# Patient Record
Sex: MALE | Race: BLACK OR AFRICAN AMERICAN | Employment: OTHER | URBAN - METROPOLITAN AREA
[De-identification: names, ages, dates, MRNs, and addresses within clinical notes are randomized per-mention and may not be internally consistent; named-entity substitution may affect disease eponyms.]

---

## 2017-01-23 DIAGNOSIS — I48.0 PAROXYSMAL ATRIAL FIBRILLATION (HCC): Primary | ICD-10-CM

## 2017-01-23 LAB
INR BLD: 1.22 (ref 0.85–1.16)
PROTHROMBIN TIME: 14 SEC (ref 9.8–13)

## 2017-01-24 ENCOUNTER — ANTI-COAG VISIT (OUTPATIENT)
Dept: CARDIOLOGY CLINIC | Age: 67
End: 2017-01-24

## 2017-01-25 ENCOUNTER — TELEPHONE (OUTPATIENT)
Dept: PRIMARY CARE CLINIC | Age: 67
End: 2017-01-25

## 2017-02-08 ENCOUNTER — TELEPHONE (OUTPATIENT)
Dept: CARDIOLOGY | Age: 67
End: 2017-02-08

## 2017-02-08 ENCOUNTER — ANTI-COAG VISIT (OUTPATIENT)
Dept: CARDIOLOGY | Age: 67
End: 2017-02-08

## 2017-02-08 DIAGNOSIS — I42.9 CARDIOMYOPATHY (HCC): Primary | ICD-10-CM

## 2017-02-08 DIAGNOSIS — I48.0 PAROXYSMAL ATRIAL FIBRILLATION (HCC): ICD-10-CM

## 2017-02-08 LAB
INR BLD: 2.45 (ref 0.85–1.16)
PROTHROMBIN TIME: 28.4 SEC (ref 9.8–13)

## 2017-02-24 ENCOUNTER — TELEPHONE (OUTPATIENT)
Dept: CARDIOLOGY CLINIC | Age: 67
End: 2017-02-24

## 2017-03-08 ENCOUNTER — OFFICE VISIT (OUTPATIENT)
Dept: CARDIOLOGY CLINIC | Age: 67
End: 2017-03-08

## 2017-03-08 ENCOUNTER — PROCEDURE VISIT (OUTPATIENT)
Dept: CARDIOLOGY CLINIC | Age: 67
End: 2017-03-08

## 2017-03-08 VITALS
BODY MASS INDEX: 37.75 KG/M2 | DIASTOLIC BLOOD PRESSURE: 104 MMHG | HEART RATE: 60 BPM | SYSTOLIC BLOOD PRESSURE: 144 MMHG | WEIGHT: 294 LBS

## 2017-03-08 DIAGNOSIS — I10 ESSENTIAL HYPERTENSION: ICD-10-CM

## 2017-03-08 DIAGNOSIS — M1A.09X1 IDIOPATHIC CHRONIC GOUT OF MULTIPLE SITES WITH TOPHUS: ICD-10-CM

## 2017-03-08 DIAGNOSIS — I50.22 CHRONIC SYSTOLIC CONGESTIVE HEART FAILURE (HCC): ICD-10-CM

## 2017-03-08 DIAGNOSIS — Z95.0 PACEMAKER: ICD-10-CM

## 2017-03-08 DIAGNOSIS — I48.0 PAROXYSMAL ATRIAL FIBRILLATION (HCC): ICD-10-CM

## 2017-03-08 DIAGNOSIS — I10 ESSENTIAL HYPERTENSION, BENIGN: Primary | ICD-10-CM

## 2017-03-08 DIAGNOSIS — I42.9 CARDIOMYOPATHY (HCC): ICD-10-CM

## 2017-03-08 DIAGNOSIS — Z95.810 ICD (IMPLANTABLE CARDIOVERTER-DEFIBRILLATOR) IN PLACE: ICD-10-CM

## 2017-03-08 PROCEDURE — 99214 OFFICE O/P EST MOD 30 MIN: CPT | Performed by: NURSE PRACTITIONER

## 2017-03-08 PROCEDURE — G8419 CALC BMI OUT NRM PARAM NOF/U: HCPCS | Performed by: NURSE PRACTITIONER

## 2017-03-08 PROCEDURE — 1123F ACP DISCUSS/DSCN MKR DOCD: CPT | Performed by: NURSE PRACTITIONER

## 2017-03-08 PROCEDURE — G8484 FLU IMMUNIZE NO ADMIN: HCPCS | Performed by: NURSE PRACTITIONER

## 2017-03-08 PROCEDURE — G8427 DOCREV CUR MEDS BY ELIG CLIN: HCPCS | Performed by: NURSE PRACTITIONER

## 2017-03-08 PROCEDURE — 4040F PNEUMOC VAC/ADMIN/RCVD: CPT | Performed by: NURSE PRACTITIONER

## 2017-03-08 PROCEDURE — 3017F COLORECTAL CA SCREEN DOC REV: CPT | Performed by: NURSE PRACTITIONER

## 2017-03-08 PROCEDURE — 93000 ELECTROCARDIOGRAM COMPLETE: CPT | Performed by: NURSE PRACTITIONER

## 2017-03-08 PROCEDURE — 1036F TOBACCO NON-USER: CPT | Performed by: NURSE PRACTITIONER

## 2017-03-08 PROCEDURE — 93284 PRGRMG EVAL IMPLANTABLE DFB: CPT | Performed by: INTERNAL MEDICINE

## 2017-03-08 RX ORDER — WARFARIN SODIUM 5 MG/1
TABLET ORAL
Qty: 60 TABLET | Refills: 3 | Status: SHIPPED | OUTPATIENT
Start: 2017-03-08 | End: 2017-05-09 | Stop reason: SDUPTHER

## 2017-03-08 RX ORDER — HYDRALAZINE HYDROCHLORIDE 50 MG/1
50 TABLET, FILM COATED ORAL 3 TIMES DAILY
Qty: 90 TABLET | Refills: 3 | Status: SHIPPED | OUTPATIENT
Start: 2017-03-08 | End: 2017-09-07 | Stop reason: SDUPTHER

## 2017-03-08 RX ORDER — AMIODARONE HYDROCHLORIDE 200 MG/1
100 TABLET ORAL DAILY
Qty: 30 TABLET | Refills: 3 | Status: SHIPPED | OUTPATIENT
Start: 2017-03-08 | End: 2017-11-17 | Stop reason: SDUPTHER

## 2017-03-08 RX ORDER — ISOSORBIDE MONONITRATE 30 MG/1
TABLET, EXTENDED RELEASE ORAL
Qty: 30 TABLET | Refills: 5 | Status: SHIPPED | OUTPATIENT
Start: 2017-03-08 | End: 2017-09-17 | Stop reason: SDUPTHER

## 2017-03-08 RX ORDER — CARVEDILOL 25 MG/1
TABLET ORAL
Qty: 60 TABLET | Refills: 5 | Status: SHIPPED | OUTPATIENT
Start: 2017-03-08 | End: 2017-09-17 | Stop reason: SDUPTHER

## 2017-03-08 RX ORDER — BENAZEPRIL HYDROCHLORIDE 20 MG/1
TABLET ORAL
Qty: 60 TABLET | Refills: 5 | Status: SHIPPED | OUTPATIENT
Start: 2017-03-08 | End: 2017-03-08

## 2017-03-08 RX ORDER — SPIRONOLACTONE 25 MG/1
TABLET ORAL
Qty: 30 TABLET | Refills: 5 | Status: SHIPPED | OUTPATIENT
Start: 2017-03-08 | End: 2017-03-22 | Stop reason: ALTCHOICE

## 2017-03-17 ENCOUNTER — ANTI-COAG VISIT (OUTPATIENT)
Dept: CARDIOLOGY CLINIC | Age: 67
End: 2017-03-17

## 2017-03-17 DIAGNOSIS — I48.0 PAROXYSMAL ATRIAL FIBRILLATION (HCC): ICD-10-CM

## 2017-03-17 DIAGNOSIS — I42.9 CARDIOMYOPATHY (HCC): ICD-10-CM

## 2017-03-17 DIAGNOSIS — M1A.09X1 IDIOPATHIC CHRONIC GOUT OF MULTIPLE SITES WITH TOPHUS: ICD-10-CM

## 2017-03-17 DIAGNOSIS — Z95.810 ICD (IMPLANTABLE CARDIOVERTER-DEFIBRILLATOR) IN PLACE: ICD-10-CM

## 2017-03-17 DIAGNOSIS — I10 ESSENTIAL HYPERTENSION: ICD-10-CM

## 2017-03-17 DIAGNOSIS — I10 ESSENTIAL HYPERTENSION, BENIGN: ICD-10-CM

## 2017-03-17 DIAGNOSIS — Z95.0 PACEMAKER: ICD-10-CM

## 2017-03-17 DIAGNOSIS — I50.22 CHRONIC SYSTOLIC CONGESTIVE HEART FAILURE (HCC): ICD-10-CM

## 2017-03-17 LAB
A/G RATIO: 1.3 (ref 1.1–2.2)
ALBUMIN SERPL-MCNC: 3.6 G/DL (ref 3.4–5)
ALP BLD-CCNC: 86 U/L (ref 40–129)
ALT SERPL-CCNC: 32 U/L (ref 10–40)
ANION GAP SERPL CALCULATED.3IONS-SCNC: 12 MMOL/L (ref 3–16)
AST SERPL-CCNC: 37 U/L (ref 15–37)
BILIRUB SERPL-MCNC: 0.7 MG/DL (ref 0–1)
BUN BLDV-MCNC: 39 MG/DL (ref 7–20)
CALCIUM SERPL-MCNC: 8.5 MG/DL (ref 8.3–10.6)
CHLORIDE BLD-SCNC: 105 MMOL/L (ref 99–110)
CO2: 26 MMOL/L (ref 21–32)
CREAT SERPL-MCNC: 2.4 MG/DL (ref 0.8–1.3)
GFR AFRICAN AMERICAN: 33
GFR NON-AFRICAN AMERICAN: 27
GLOBULIN: 2.8 G/DL
GLUCOSE BLD-MCNC: 75 MG/DL (ref 70–99)
INR BLD: 2.85 (ref 0.85–1.15)
POTASSIUM SERPL-SCNC: 4 MMOL/L (ref 3.5–5.1)
PROTHROMBIN TIME: 32.2 SEC (ref 9.6–13)
SODIUM BLD-SCNC: 143 MMOL/L (ref 136–145)
T4 FREE: 1.4 NG/DL (ref 0.9–1.8)
TOTAL PROTEIN: 6.4 G/DL (ref 6.4–8.2)
TSH REFLEX FT4: 7.85 UIU/ML (ref 0.27–4.2)

## 2017-03-22 ENCOUNTER — TELEPHONE (OUTPATIENT)
Dept: CARDIOLOGY CLINIC | Age: 67
End: 2017-03-22

## 2017-03-22 ENCOUNTER — OFFICE VISIT (OUTPATIENT)
Dept: CARDIOLOGY CLINIC | Age: 67
End: 2017-03-22

## 2017-03-22 VITALS
WEIGHT: 303 LBS | HEART RATE: 68 BPM | SYSTOLIC BLOOD PRESSURE: 120 MMHG | BODY MASS INDEX: 38.9 KG/M2 | DIASTOLIC BLOOD PRESSURE: 90 MMHG

## 2017-03-22 DIAGNOSIS — I10 ESSENTIAL HYPERTENSION: ICD-10-CM

## 2017-03-22 DIAGNOSIS — M10.9 ACUTE GOUT OF LEFT WRIST, UNSPECIFIED CAUSE: ICD-10-CM

## 2017-03-22 DIAGNOSIS — I10 ESSENTIAL HYPERTENSION, BENIGN: Primary | ICD-10-CM

## 2017-03-22 DIAGNOSIS — I42.9 CARDIOMYOPATHY (HCC): ICD-10-CM

## 2017-03-22 DIAGNOSIS — I50.22 CHRONIC SYSTOLIC CONGESTIVE HEART FAILURE (HCC): ICD-10-CM

## 2017-03-22 DIAGNOSIS — I48.0 PAROXYSMAL ATRIAL FIBRILLATION (HCC): ICD-10-CM

## 2017-03-22 PROCEDURE — 1036F TOBACCO NON-USER: CPT | Performed by: NURSE PRACTITIONER

## 2017-03-22 PROCEDURE — G8427 DOCREV CUR MEDS BY ELIG CLIN: HCPCS | Performed by: NURSE PRACTITIONER

## 2017-03-22 PROCEDURE — 99214 OFFICE O/P EST MOD 30 MIN: CPT | Performed by: NURSE PRACTITIONER

## 2017-03-22 PROCEDURE — 3017F COLORECTAL CA SCREEN DOC REV: CPT | Performed by: NURSE PRACTITIONER

## 2017-03-22 PROCEDURE — G8484 FLU IMMUNIZE NO ADMIN: HCPCS | Performed by: NURSE PRACTITIONER

## 2017-03-22 PROCEDURE — 4040F PNEUMOC VAC/ADMIN/RCVD: CPT | Performed by: NURSE PRACTITIONER

## 2017-03-22 PROCEDURE — G8419 CALC BMI OUT NRM PARAM NOF/U: HCPCS | Performed by: NURSE PRACTITIONER

## 2017-03-22 PROCEDURE — 1123F ACP DISCUSS/DSCN MKR DOCD: CPT | Performed by: NURSE PRACTITIONER

## 2017-03-22 RX ORDER — COLCHICINE 0.6 MG/1
0.6 TABLET ORAL 2 TIMES DAILY
Qty: 60 TABLET | Refills: 1 | Status: SHIPPED | OUTPATIENT
Start: 2017-03-22 | End: 2017-03-22 | Stop reason: SDUPTHER

## 2017-03-22 RX ORDER — HYDROCODONE BITARTRATE AND ACETAMINOPHEN 5; 325 MG/1; MG/1
1 TABLET ORAL EVERY 6 HOURS PRN
COMMUNITY
End: 2018-02-12 | Stop reason: ALTCHOICE

## 2017-03-22 RX ORDER — COLCHICINE 0.6 MG/1
0.6 TABLET ORAL DAILY
Qty: 60 TABLET | Refills: 1
Start: 2017-03-22 | End: 2018-02-12 | Stop reason: ALTCHOICE

## 2017-04-13 ENCOUNTER — TELEPHONE (OUTPATIENT)
Dept: RHEUMATOLOGY | Age: 67
End: 2017-04-13

## 2017-04-13 RX ORDER — PREDNISONE 10 MG/1
TABLET ORAL
Qty: 20 TABLET | Refills: 0 | Status: SHIPPED | OUTPATIENT
Start: 2017-04-13 | End: 2018-02-12 | Stop reason: ALTCHOICE

## 2017-05-05 ENCOUNTER — OFFICE VISIT (OUTPATIENT)
Dept: CARDIOLOGY CLINIC | Age: 67
End: 2017-05-05

## 2017-05-05 VITALS
HEART RATE: 70 BPM | BODY MASS INDEX: 38.54 KG/M2 | WEIGHT: 300.2 LBS | DIASTOLIC BLOOD PRESSURE: 90 MMHG | SYSTOLIC BLOOD PRESSURE: 142 MMHG

## 2017-05-05 DIAGNOSIS — I10 ESSENTIAL HYPERTENSION, BENIGN: ICD-10-CM

## 2017-05-05 DIAGNOSIS — I42.9 CARDIOMYOPATHY (HCC): Primary | ICD-10-CM

## 2017-05-05 DIAGNOSIS — I10 ESSENTIAL HYPERTENSION: ICD-10-CM

## 2017-05-05 DIAGNOSIS — I48.0 PAROXYSMAL ATRIAL FIBRILLATION (HCC): ICD-10-CM

## 2017-05-05 PROCEDURE — 99214 OFFICE O/P EST MOD 30 MIN: CPT | Performed by: NURSE PRACTITIONER

## 2017-05-05 PROCEDURE — G8419 CALC BMI OUT NRM PARAM NOF/U: HCPCS | Performed by: NURSE PRACTITIONER

## 2017-05-05 PROCEDURE — 1123F ACP DISCUSS/DSCN MKR DOCD: CPT | Performed by: NURSE PRACTITIONER

## 2017-05-05 PROCEDURE — 1036F TOBACCO NON-USER: CPT | Performed by: NURSE PRACTITIONER

## 2017-05-05 PROCEDURE — 3017F COLORECTAL CA SCREEN DOC REV: CPT | Performed by: NURSE PRACTITIONER

## 2017-05-05 PROCEDURE — 4040F PNEUMOC VAC/ADMIN/RCVD: CPT | Performed by: NURSE PRACTITIONER

## 2017-05-05 PROCEDURE — G8427 DOCREV CUR MEDS BY ELIG CLIN: HCPCS | Performed by: NURSE PRACTITIONER

## 2017-05-09 ENCOUNTER — ANTI-COAG VISIT (OUTPATIENT)
Dept: CARDIOLOGY CLINIC | Age: 67
End: 2017-05-09

## 2017-05-09 DIAGNOSIS — I10 ESSENTIAL HYPERTENSION: ICD-10-CM

## 2017-05-09 DIAGNOSIS — Z95.0 PACEMAKER: ICD-10-CM

## 2017-05-09 DIAGNOSIS — I48.0 PAROXYSMAL ATRIAL FIBRILLATION (HCC): ICD-10-CM

## 2017-05-10 RX ORDER — WARFARIN SODIUM 5 MG/1
TABLET ORAL
Qty: 60 TABLET | Refills: 0 | Status: SHIPPED | OUTPATIENT
Start: 2017-05-10 | End: 2019-05-08 | Stop reason: CLARIF

## 2017-05-12 DIAGNOSIS — I48.0 PAROXYSMAL ATRIAL FIBRILLATION (HCC): ICD-10-CM

## 2017-05-12 LAB
INR BLD: 2.5 (ref 0.85–1.15)
PROTHROMBIN TIME: 28.2 SEC (ref 9.6–13)

## 2017-05-15 LAB — INR BLD: 2.5

## 2017-05-17 ENCOUNTER — TELEPHONE (OUTPATIENT)
Dept: CARDIOLOGY CLINIC | Age: 67
End: 2017-05-17

## 2017-06-13 ENCOUNTER — NURSE ONLY (OUTPATIENT)
Dept: CARDIOLOGY CLINIC | Age: 67
End: 2017-06-13

## 2017-06-13 DIAGNOSIS — I50.22 CHRONIC SYSTOLIC CONGESTIVE HEART FAILURE (HCC): ICD-10-CM

## 2017-06-13 DIAGNOSIS — Z95.810 ICD (IMPLANTABLE CARDIOVERTER-DEFIBRILLATOR) IN PLACE: Primary | ICD-10-CM

## 2017-06-13 DIAGNOSIS — I42.9 CARDIOMYOPATHY (HCC): ICD-10-CM

## 2017-06-13 PROCEDURE — 93297 REM INTERROG DEV EVAL ICPMS: CPT | Performed by: INTERNAL MEDICINE

## 2017-06-13 PROCEDURE — 93295 DEV INTERROG REMOTE 1/2/MLT: CPT | Performed by: INTERNAL MEDICINE

## 2017-06-13 PROCEDURE — 93296 REM INTERROG EVL PM/IDS: CPT | Performed by: INTERNAL MEDICINE

## 2017-06-30 ENCOUNTER — OFFICE VISIT (OUTPATIENT)
Dept: PRIMARY CARE CLINIC | Age: 67
End: 2017-06-30

## 2017-06-30 VITALS
DIASTOLIC BLOOD PRESSURE: 68 MMHG | BODY MASS INDEX: 36.96 KG/M2 | SYSTOLIC BLOOD PRESSURE: 118 MMHG | HEIGHT: 74 IN | WEIGHT: 288 LBS

## 2017-06-30 DIAGNOSIS — Z00.00 HEALTH CARE MAINTENANCE: ICD-10-CM

## 2017-06-30 DIAGNOSIS — I42.9 CARDIOMYOPATHY (HCC): ICD-10-CM

## 2017-06-30 DIAGNOSIS — I10 ESSENTIAL HYPERTENSION: ICD-10-CM

## 2017-06-30 DIAGNOSIS — D47.2 MONOCLONAL GAMMOPATHY: ICD-10-CM

## 2017-06-30 DIAGNOSIS — I48.0 PAROXYSMAL ATRIAL FIBRILLATION (HCC): ICD-10-CM

## 2017-06-30 DIAGNOSIS — N18.4 CKD (CHRONIC KIDNEY DISEASE), STAGE 4 (SEVERE): ICD-10-CM

## 2017-06-30 PROCEDURE — 90472 IMMUNIZATION ADMIN EACH ADD: CPT | Performed by: INTERNAL MEDICINE

## 2017-06-30 PROCEDURE — 90471 IMMUNIZATION ADMIN: CPT | Performed by: INTERNAL MEDICINE

## 2017-06-30 PROCEDURE — 90715 TDAP VACCINE 7 YRS/> IM: CPT | Performed by: INTERNAL MEDICINE

## 2017-06-30 PROCEDURE — 99203 OFFICE O/P NEW LOW 30 MIN: CPT | Performed by: INTERNAL MEDICINE

## 2017-06-30 PROCEDURE — 90670 PCV13 VACCINE IM: CPT | Performed by: INTERNAL MEDICINE

## 2017-06-30 RX ORDER — DIPHENHYDRAMINE HCL 25 MG
25 CAPSULE ORAL EVERY 6 HOURS PRN
Qty: 30 CAPSULE | COMMUNITY
Start: 2017-06-30 | End: 2017-07-10

## 2017-06-30 ASSESSMENT — ENCOUNTER SYMPTOMS
NAUSEA: 0
COUGH: 0
WHEEZING: 0
EYE REDNESS: 0
EYE ITCHING: 0
BACK PAIN: 0
VOMITING: 0
SORE THROAT: 0
CONSTIPATION: 0
DIARRHEA: 0
CHEST TIGHTNESS: 0
ABDOMINAL PAIN: 0

## 2017-07-06 ENCOUNTER — PROCEDURE VISIT (OUTPATIENT)
Dept: CARDIOLOGY CLINIC | Age: 67
End: 2017-07-06

## 2017-07-06 ENCOUNTER — OFFICE VISIT (OUTPATIENT)
Dept: CARDIOLOGY CLINIC | Age: 67
End: 2017-07-06

## 2017-07-06 VITALS
HEART RATE: 60 BPM | WEIGHT: 292 LBS | SYSTOLIC BLOOD PRESSURE: 124 MMHG | DIASTOLIC BLOOD PRESSURE: 74 MMHG | BODY MASS INDEX: 37.49 KG/M2

## 2017-07-06 DIAGNOSIS — Z95.810 ICD (IMPLANTABLE CARDIOVERTER-DEFIBRILLATOR) IN PLACE: ICD-10-CM

## 2017-07-06 DIAGNOSIS — I42.9 CARDIOMYOPATHY (HCC): Primary | ICD-10-CM

## 2017-07-06 DIAGNOSIS — I42.9 CARDIOMYOPATHY (HCC): ICD-10-CM

## 2017-07-06 DIAGNOSIS — I10 ESSENTIAL HYPERTENSION: ICD-10-CM

## 2017-07-06 DIAGNOSIS — I48.0 PAROXYSMAL ATRIAL FIBRILLATION (HCC): Primary | ICD-10-CM

## 2017-07-06 PROCEDURE — 93284 PRGRMG EVAL IMPLANTABLE DFB: CPT | Performed by: INTERNAL MEDICINE

## 2017-07-06 PROCEDURE — 99214 OFFICE O/P EST MOD 30 MIN: CPT | Performed by: INTERNAL MEDICINE

## 2017-07-24 ENCOUNTER — TELEPHONE (OUTPATIENT)
Dept: PRIMARY CARE CLINIC | Age: 67
End: 2017-07-24

## 2017-08-08 ENCOUNTER — TELEPHONE (OUTPATIENT)
Dept: PRIMARY CARE CLINIC | Age: 67
End: 2017-08-08

## 2017-09-07 RX ORDER — HYDRALAZINE HYDROCHLORIDE 50 MG/1
TABLET, FILM COATED ORAL
Qty: 90 TABLET | Refills: 2 | Status: SHIPPED | OUTPATIENT
Start: 2017-09-07 | End: 2018-01-11 | Stop reason: SDUPTHER

## 2017-09-08 ENCOUNTER — OFFICE VISIT (OUTPATIENT)
Dept: PRIMARY CARE CLINIC | Age: 67
End: 2017-09-08

## 2017-09-08 DIAGNOSIS — I42.9 CARDIOMYOPATHY, UNSPECIFIED TYPE (HCC): ICD-10-CM

## 2017-09-08 DIAGNOSIS — D47.2 MONOCLONAL GAMMOPATHY: ICD-10-CM

## 2017-09-08 DIAGNOSIS — M10.9 GOUT, UNSPECIFIED CAUSE, UNSPECIFIED CHRONICITY, UNSPECIFIED SITE: ICD-10-CM

## 2017-09-08 DIAGNOSIS — Z12.11 COLON CANCER SCREENING: Primary | ICD-10-CM

## 2017-09-08 DIAGNOSIS — I48.0 PAROXYSMAL ATRIAL FIBRILLATION (HCC): ICD-10-CM

## 2017-09-08 LAB
INR BLD: 1.42 (ref 0.85–1.15)
PROTHROMBIN TIME: 16.1 SEC (ref 9.6–13)

## 2017-09-08 PROCEDURE — 90471 IMMUNIZATION ADMIN: CPT | Performed by: INTERNAL MEDICINE

## 2017-09-08 PROCEDURE — 99214 OFFICE O/P EST MOD 30 MIN: CPT | Performed by: INTERNAL MEDICINE

## 2017-09-08 PROCEDURE — 90662 IIV NO PRSV INCREASED AG IM: CPT | Performed by: INTERNAL MEDICINE

## 2017-09-08 ASSESSMENT — PATIENT HEALTH QUESTIONNAIRE - PHQ9
2. FEELING DOWN, DEPRESSED OR HOPELESS: 0
SUM OF ALL RESPONSES TO PHQ9 QUESTIONS 1 & 2: 0
SUM OF ALL RESPONSES TO PHQ QUESTIONS 1-9: 0
1. LITTLE INTEREST OR PLEASURE IN DOING THINGS: 0

## 2017-09-11 VITALS
OXYGEN SATURATION: 98 % | SYSTOLIC BLOOD PRESSURE: 128 MMHG | HEART RATE: 74 BPM | TEMPERATURE: 97.7 F | DIASTOLIC BLOOD PRESSURE: 86 MMHG | BODY MASS INDEX: 36.19 KG/M2 | WEIGHT: 282 LBS | HEIGHT: 74 IN

## 2017-09-11 ASSESSMENT — ENCOUNTER SYMPTOMS
ABDOMINAL PAIN: 0
CONSTIPATION: 0
EYE REDNESS: 0
COUGH: 0
CHEST TIGHTNESS: 0
EYE ITCHING: 0
WHEEZING: 0
BACK PAIN: 0
DIARRHEA: 0
NAUSEA: 0
VOMITING: 0
SORE THROAT: 0

## 2017-09-12 ENCOUNTER — ANTI-COAG VISIT (OUTPATIENT)
Dept: CARDIOLOGY CLINIC | Age: 67
End: 2017-09-12

## 2017-09-12 ENCOUNTER — NURSE ONLY (OUTPATIENT)
Dept: CARDIOLOGY CLINIC | Age: 67
End: 2017-09-12

## 2017-09-12 DIAGNOSIS — Z95.810 ICD (IMPLANTABLE CARDIOVERTER-DEFIBRILLATOR) IN PLACE: ICD-10-CM

## 2017-09-12 DIAGNOSIS — I42.9 CARDIOMYOPATHY, UNSPECIFIED TYPE (HCC): ICD-10-CM

## 2017-09-12 DIAGNOSIS — I42.9 CARDIOMYOPATHY (HCC): ICD-10-CM

## 2017-09-12 PROCEDURE — 93296 REM INTERROG EVL PM/IDS: CPT | Performed by: INTERNAL MEDICINE

## 2017-09-12 PROCEDURE — 93295 DEV INTERROG REMOTE 1/2/MLT: CPT | Performed by: INTERNAL MEDICINE

## 2017-09-18 RX ORDER — ISOSORBIDE MONONITRATE 30 MG/1
TABLET, EXTENDED RELEASE ORAL
Qty: 30 TABLET | Refills: 4 | Status: SHIPPED | OUTPATIENT
Start: 2017-09-18 | End: 2018-02-12 | Stop reason: SDUPTHER

## 2017-09-18 RX ORDER — SPIRONOLACTONE 25 MG/1
TABLET ORAL
Qty: 1 TABLET | Refills: 0 | OUTPATIENT
Start: 2017-09-18

## 2017-09-18 RX ORDER — CARVEDILOL 25 MG/1
TABLET ORAL
Qty: 60 TABLET | Refills: 4 | Status: SHIPPED | OUTPATIENT
Start: 2017-09-18 | End: 2018-02-12 | Stop reason: SDUPTHER

## 2017-10-02 DIAGNOSIS — I48.0 PAROXYSMAL ATRIAL FIBRILLATION (HCC): ICD-10-CM

## 2017-10-02 LAB
INR BLD: 2.49 (ref 0.85–1.15)
PROTHROMBIN TIME: 28.1 SEC (ref 9.6–13)

## 2017-10-03 ENCOUNTER — ANTI-COAG VISIT (OUTPATIENT)
Dept: CARDIOLOGY CLINIC | Age: 67
End: 2017-10-03

## 2017-10-31 RX ORDER — SPIRONOLACTONE 25 MG/1
TABLET ORAL
Qty: 30 TABLET | Refills: 4 | Status: SHIPPED | OUTPATIENT
Start: 2017-10-31 | End: 2018-02-12 | Stop reason: SDUPTHER

## 2017-11-17 RX ORDER — AMIODARONE HYDROCHLORIDE 200 MG/1
TABLET ORAL
Qty: 30 TABLET | Refills: 2 | Status: SHIPPED | OUTPATIENT
Start: 2017-11-17 | End: 2018-02-12 | Stop reason: SDUPTHER

## 2017-11-29 DIAGNOSIS — I48.0 PAROXYSMAL ATRIAL FIBRILLATION (HCC): ICD-10-CM

## 2017-12-12 ENCOUNTER — NURSE ONLY (OUTPATIENT)
Dept: CARDIOLOGY CLINIC | Age: 67
End: 2017-12-12

## 2017-12-12 DIAGNOSIS — I42.9 CARDIOMYOPATHY (HCC): ICD-10-CM

## 2017-12-12 DIAGNOSIS — I42.9 CARDIOMYOPATHY, UNSPECIFIED TYPE (HCC): ICD-10-CM

## 2017-12-12 DIAGNOSIS — Z95.810 ICD (IMPLANTABLE CARDIOVERTER-DEFIBRILLATOR) IN PLACE: ICD-10-CM

## 2017-12-12 PROCEDURE — 93295 DEV INTERROG REMOTE 1/2/MLT: CPT | Performed by: INTERNAL MEDICINE

## 2017-12-12 PROCEDURE — 93296 REM INTERROG EVL PM/IDS: CPT | Performed by: INTERNAL MEDICINE

## 2017-12-12 NOTE — LETTER
[address] [phone]  [address] [facility],[address] [phone]    Pacemaker/Defibrillator Clinic          12/15/17        Louisa Tong  Kalyan Montemayor  [address]        Dear Neliacyndi Ran    This letter is to inform you that we received the transmission from your monitor at home that checks your pacemaker and/or defibrillator, or implanted heart monitor. Everything is within normal limits. The next date your monitor will automatically transmit will be 3/13/18. Please do not send additional routine transmissions unless specifically requested. Your device and monitor are wireless and most transmit cellularly, but please periodically check your monitor is still plugged in to the electrical outlet. If you still use the telephone land line to send please ensure the connection to the phone lamar is secure. This will help to ensure successful automatic transmissions in the future. Also, the monitor needs to be close to you while sleeping at night. Please be aware that the remote device transmission sites are periodically monitored only during regular business hours during which simultaneous in-office device clinics are being run. If your transmission requires attention, we will contact you as soon as possible. Thank you.             Cezar 83

## 2017-12-15 NOTE — PROGRESS NOTES
Merlin transmission shows normal device function. Battery and lead function stable. No VT. PAF on Coumadin, amio. See interrogation for further details. Recheck 3 mos.  mk

## 2018-01-12 RX ORDER — HYDRALAZINE HYDROCHLORIDE 50 MG/1
TABLET, FILM COATED ORAL
Qty: 90 TABLET | Refills: 1 | Status: SHIPPED | OUTPATIENT
Start: 2018-01-12 | End: 2018-02-12 | Stop reason: SDUPTHER

## 2018-02-12 ENCOUNTER — PROCEDURE VISIT (OUTPATIENT)
Dept: CARDIOLOGY CLINIC | Age: 68
End: 2018-02-12

## 2018-02-12 ENCOUNTER — TELEPHONE (OUTPATIENT)
Dept: CARDIOLOGY CLINIC | Age: 68
End: 2018-02-12

## 2018-02-12 ENCOUNTER — OFFICE VISIT (OUTPATIENT)
Dept: PRIMARY CARE CLINIC | Age: 68
End: 2018-02-12

## 2018-02-12 ENCOUNTER — OFFICE VISIT (OUTPATIENT)
Dept: CARDIOLOGY CLINIC | Age: 68
End: 2018-02-12

## 2018-02-12 VITALS
DIASTOLIC BLOOD PRESSURE: 76 MMHG | BODY MASS INDEX: 36.06 KG/M2 | HEIGHT: 74 IN | SYSTOLIC BLOOD PRESSURE: 112 MMHG | WEIGHT: 281 LBS

## 2018-02-12 VITALS
HEART RATE: 60 BPM | WEIGHT: 277.2 LBS | BODY MASS INDEX: 35.59 KG/M2 | DIASTOLIC BLOOD PRESSURE: 70 MMHG | SYSTOLIC BLOOD PRESSURE: 110 MMHG

## 2018-02-12 DIAGNOSIS — I10 ESSENTIAL HYPERTENSION: ICD-10-CM

## 2018-02-12 DIAGNOSIS — I42.9 CARDIOMYOPATHY, UNSPECIFIED TYPE (HCC): ICD-10-CM

## 2018-02-12 DIAGNOSIS — Z95.810 ICD (IMPLANTABLE CARDIOVERTER-DEFIBRILLATOR) IN PLACE: ICD-10-CM

## 2018-02-12 DIAGNOSIS — I48.91 ATRIAL FIBRILLATION, UNSPECIFIED TYPE (HCC): ICD-10-CM

## 2018-02-12 DIAGNOSIS — I48.91 ATRIAL FIBRILLATION, UNSPECIFIED TYPE (HCC): Primary | ICD-10-CM

## 2018-02-12 DIAGNOSIS — D47.2 MONOCLONAL GAMMOPATHY: ICD-10-CM

## 2018-02-12 DIAGNOSIS — N18.4 STAGE 4 CHRONIC KIDNEY DISEASE (HCC): ICD-10-CM

## 2018-02-12 LAB
A/G RATIO: 1.4 (ref 1.1–2.2)
ALBUMIN SERPL-MCNC: 4.1 G/DL (ref 3.4–5)
ALP BLD-CCNC: 93 U/L (ref 40–129)
ALT SERPL-CCNC: 38 U/L (ref 10–40)
ANION GAP SERPL CALCULATED.3IONS-SCNC: 12 MMOL/L (ref 3–16)
AST SERPL-CCNC: 55 U/L (ref 15–37)
BILIRUB SERPL-MCNC: 0.7 MG/DL (ref 0–1)
BUN BLDV-MCNC: 44 MG/DL (ref 7–20)
CALCIUM SERPL-MCNC: 9.6 MG/DL (ref 8.3–10.6)
CHLORIDE BLD-SCNC: 107 MMOL/L (ref 99–110)
CHOLESTEROL, TOTAL: 189 MG/DL (ref 0–199)
CO2: 17 MMOL/L (ref 21–32)
CREAT SERPL-MCNC: 2.9 MG/DL (ref 0.8–1.3)
GFR AFRICAN AMERICAN: 26
GFR NON-AFRICAN AMERICAN: 22
GLOBULIN: 3 G/DL
GLUCOSE BLD-MCNC: 83 MG/DL (ref 70–99)
HDLC SERPL-MCNC: 54 MG/DL (ref 40–60)
INR BLD: 1.56 (ref 0.85–1.15)
LDL CHOLESTEROL CALCULATED: 109 MG/DL
POTASSIUM SERPL-SCNC: 6.4 MMOL/L (ref 3.5–5.1)
PROTHROMBIN TIME: 17.6 SEC (ref 9.6–13)
SODIUM BLD-SCNC: 136 MMOL/L (ref 136–145)
TOTAL PROTEIN: 7.1 G/DL (ref 6.4–8.2)
TRIGL SERPL-MCNC: 128 MG/DL (ref 0–150)
VLDLC SERPL CALC-MCNC: 26 MG/DL

## 2018-02-12 PROCEDURE — 93284 PRGRMG EVAL IMPLANTABLE DFB: CPT | Performed by: INTERNAL MEDICINE

## 2018-02-12 PROCEDURE — 4040F PNEUMOC VAC/ADMIN/RCVD: CPT | Performed by: INTERNAL MEDICINE

## 2018-02-12 PROCEDURE — G8484 FLU IMMUNIZE NO ADMIN: HCPCS | Performed by: INTERNAL MEDICINE

## 2018-02-12 PROCEDURE — 99214 OFFICE O/P EST MOD 30 MIN: CPT | Performed by: INTERNAL MEDICINE

## 2018-02-12 PROCEDURE — 90732 PPSV23 VACC 2 YRS+ SUBQ/IM: CPT | Performed by: INTERNAL MEDICINE

## 2018-02-12 PROCEDURE — 3017F COLORECTAL CA SCREEN DOC REV: CPT | Performed by: INTERNAL MEDICINE

## 2018-02-12 PROCEDURE — 90471 IMMUNIZATION ADMIN: CPT | Performed by: INTERNAL MEDICINE

## 2018-02-12 PROCEDURE — G8417 CALC BMI ABV UP PARAM F/U: HCPCS | Performed by: INTERNAL MEDICINE

## 2018-02-12 PROCEDURE — 1036F TOBACCO NON-USER: CPT | Performed by: INTERNAL MEDICINE

## 2018-02-12 PROCEDURE — 1123F ACP DISCUSS/DSCN MKR DOCD: CPT | Performed by: INTERNAL MEDICINE

## 2018-02-12 PROCEDURE — G8427 DOCREV CUR MEDS BY ELIG CLIN: HCPCS | Performed by: INTERNAL MEDICINE

## 2018-02-12 RX ORDER — CARVEDILOL 25 MG/1
TABLET ORAL
Qty: 180 TABLET | Refills: 3 | Status: SHIPPED | OUTPATIENT
Start: 2018-02-12 | End: 2019-03-12 | Stop reason: SDUPTHER

## 2018-02-12 RX ORDER — ISOSORBIDE MONONITRATE 30 MG/1
TABLET, EXTENDED RELEASE ORAL
Qty: 90 TABLET | Refills: 3 | Status: SHIPPED | OUTPATIENT
Start: 2018-02-12 | End: 2019-03-12 | Stop reason: SDUPTHER

## 2018-02-12 RX ORDER — SPIRONOLACTONE 25 MG/1
TABLET ORAL
Qty: 90 TABLET | Refills: 3 | Status: SHIPPED | OUTPATIENT
Start: 2018-02-12 | End: 2018-02-12 | Stop reason: ALTCHOICE

## 2018-02-12 RX ORDER — HYDRALAZINE HYDROCHLORIDE 50 MG/1
TABLET, FILM COATED ORAL
Qty: 180 TABLET | Refills: 3 | Status: SHIPPED | OUTPATIENT
Start: 2018-02-12 | End: 2018-02-12 | Stop reason: ALTCHOICE

## 2018-02-12 RX ORDER — AMIODARONE HYDROCHLORIDE 200 MG/1
TABLET ORAL
Qty: 90 TABLET | Refills: 3 | Status: SHIPPED | OUTPATIENT
Start: 2018-02-12 | End: 2018-03-29 | Stop reason: SDUPTHER

## 2018-02-12 ASSESSMENT — ENCOUNTER SYMPTOMS
BACK PAIN: 0
COUGH: 0
DIARRHEA: 0
VOMITING: 0
SORE THROAT: 0
EYE ITCHING: 0
EYE REDNESS: 0
NAUSEA: 0
CHEST TIGHTNESS: 0
CONSTIPATION: 0
WHEEZING: 0
ABDOMINAL PAIN: 0

## 2018-02-12 NOTE — PROGRESS NOTES
to palpation  EXT: No edema, no calf tenderness. Pulses are present bilaterally. DATA:    Lab Results   Component Value Date    ALT 32 03/17/2017     Lab Results   Component Value Date    CREATININE 2.4 (H) 03/17/2017     No results found for: TSH  Lab Results   Component Value Date    WBC 8.8 02/26/2017     No components found for: CHLPL  Lab Results   Component Value Date    TRIG 143 01/06/2016     Lab Results   Component Value Date    HDL 49 01/06/2016     Lab Results   Component Value Date    LDLCALC 123 (H) 01/06/2016     Lab Results   Component Value Date    LABVLDL 29 01/06/2016     Radiology Review:  Pertinent images / reports were reviewed as a part of this visit and reveals the following:    Last Echo: 1/11/16  Summary  Left ventricle size is normal with mild concentric left ventricular  hypertrophy. Ejection fraction is estimated to be 50-55%. Cannot rule out mild hypokinesis of the apical anterior wall. Normal right ventricular size and function. Pacer / ICD wire is visualized in the right ventricle. Mild biatrial enlargement. Sinus of valsalva appears to be mildly dilated. Mild-moderate mitral regurgitation is present. Mild pulmonic regurgitation present. There is mild tricuspid regurgitation with RVSP estimated at 35 mmHg. Signature     Last Stress Test / Angiogram:    Last ECG:    Assessment:    Cardiomyopathy. Defibrillator. Significantly improved ejection fraction. Plan:     Fasting lipids and CMP. Return in 6 months. He may need a statin for his lipid profile.

## 2018-02-12 NOTE — PROGRESS NOTES
tablet TAKE ONE TABLET BY MOUTH DAILY Yes Cj Belle NP   carvedilol (COREG) 25 MG tablet TAKE ONE TABLET BY MOUTH TWICE A DAY Yes Cj Belle NP   warfarin (COUMADIN) 5 MG tablet Take as directed Yes Cj Belle NP   predniSONE (DELTASONE) 10 MG tablet 3 pills daily x 3 days. 2 pills daily x 3 days. 1 pill daily x 3 days. 1/2 pill daily for next 3 days and stop. Yes Latonia Atkinson MD   HYDROcodone-acetaminophen (NORCO) 5-325 MG per tablet Take 1 tablet by mouth every 6 hours as needed for Pain . Yes Historical Provider, MD   colchicine (COLCRYS) 0.6 MG tablet Take 1 tablet by mouth daily Yes Cj Belle NP   sodium bicarbonate POWD powder Take 0.65 g by mouth 4 times daily Yes Ron Cyr MD   allopurinol (ZYLOPRIM) 300 MG tablet Take 300 mg by mouth daily Yes Historical Provider, MD     Family History  Family History   Problem Relation Age of Onset    Heart Disease Father      CHF- of    High Blood Pressure Father     Heart Disease Mother     High Blood Pressure Mother     High Blood Pressure Brother      Stent placement x2       Current Medications  Current Outpatient Prescriptions   Medication Sig Dispense Refill    hydrALAZINE (APRESOLINE) 50 MG tablet TAKE ONE TABLET BY MOUTH THREE TIMES A DAY 90 tablet 1    amiodarone (CORDARONE) 200 MG tablet TAKE ONE-HALF TABLET BY MOUTH DAILY 30 tablet 2    spironolactone (ALDACTONE) 25 MG tablet TAKE ONE TABLET BY MOUTH DAILY 30 tablet 4    isosorbide mononitrate (IMDUR) 30 MG extended release tablet TAKE ONE TABLET BY MOUTH DAILY 30 tablet 4    carvedilol (COREG) 25 MG tablet TAKE ONE TABLET BY MOUTH TWICE A DAY 60 tablet 4    warfarin (COUMADIN) 5 MG tablet Take as directed 60 tablet 0    predniSONE (DELTASONE) 10 MG tablet 3 pills daily x 3 days. 2 pills daily x 3 days. 1 pill daily x 3 days. 1/2 pill daily for next 3 days and stop.  20 tablet 0    HYDROcodone-acetaminophen (NORCO) 5-325 MG per tablet Take 1 tablet by mouth every 6 hours as needed for Pain .  colchicine (COLCRYS) 0.6 MG tablet Take 1 tablet by mouth daily 60 tablet 1    sodium bicarbonate POWD powder Take 0.65 g by mouth 4 times daily 1 Bottle 0    allopurinol (ZYLOPRIM) 300 MG tablet Take 300 mg by mouth daily       No current facility-administered medications for this visit. REVIEW OF SYSTEMS:    CONSTITUTIONAL: No major weight gain or loss, fatigue, weakness, night sweats or fever. HEENT: No new vision difficulties or ringing in the ears. RESPIRATORY: No new SOB, PND, orthopnea or cough. CARDIOVASCULAR: See HPI  GI: No nausea, vomiting, diarrhea, constipation, abdominal pain or changes in bowel habits. : No urinary frequency, urgency, incontinence hematuria or dysuria. SKIN: No cyanosis or skin lesions. MUSCULOSKELETAL: No new muscle or joint pain. NEUROLOGICAL: No syncope or TIA-like symptoms. PSYCHIATRIC: No anxiety, pain, insomnia or depression    Objective:   PHYSICAL EXAM:        VITALS:    Wt Readings from Last 3 Encounters:   02/12/18 277 lb 3.2 oz (125.7 kg)   02/12/18 281 lb (127.5 kg)   09/08/17 282 lb (127.9 kg)     BP Readings from Last 3 Encounters:   02/12/18 110/70   02/12/18 112/76   09/08/17 128/86     Pulse Readings from Last 3 Encounters:   02/12/18 60   09/08/17 74   07/06/17 60       CONSTITUTIONAL: Cooperative, no apparent distress, and appears well nourished / developed  NEUROLOGIC:  Awake and orientated to person, place and time. PSYCH: Calm affect. SKIN: Warm and dry. HEENT: Sclera non-icteric, normocephalic, neck supple, no elevation of JVP, normal carotid pulses with no bruits and thyroid normal size. LUNGS:  No increased work of breathing and clear to auscultation, no crackles or wheezing  CARDIOVASCULAR: There is no edema. The rhythm is regular. The lips or thrills. the abdomen is soft.   Cervical veins are not engorged  The carotid upstroke is normal in amplitude and contour without delay or bruit  JVP is not elevated  ABDOMEN:  Normal bowel sounds, non-distended and non-tender to palpation  EXT: No edema, no calf tenderness. Pulses are present bilaterally. DATA:    Lab Results   Component Value Date    ALT 32 2017     Lab Results   Component Value Date    CREATININE 2.4 (H) 2017     No results found for: TSH  Lab Results   Component Value Date    WBC 8.8 2017     No components found for: CHLPL  Lab Results   Component Value Date    TRIG 143 2016     Lab Results   Component Value Date    HDL 49 2016     Lab Results   Component Value Date    LDLCALC 123 (H) 2016     Lab Results   Component Value Date    LABVLDL 29 2016     Radiology Review:  Pertinent images / reports were reviewed as a part of this visit and reveals the following:    Last Echo: 16  Summary  Left ventricle size is normal with mild concentric left ventricular  hypertrophy. Ejection fraction is estimated to be 50-55%. Cannot rule out mild hypokinesis of the apical anterior wall. Normal right ventricular size and function. Pacer / ICD wire is visualized in the right ventricle. Mild biatrial enlargement. Sinus of valsalva appears to be mildly dilated. Mild-moderate mitral regurgitation is present. Mild pulmonic regurgitation present. There is mild tricuspid regurgitation with RVSP estimated at 35 mmHg. Signature     Last Stress Test / Angiogram:    Last ECG:    Assessment:    Cardiomyopathy. Defibrillator. Significantly improved ejection fraction. Plan:   Generally doing well from a cardiac perspective. We will continue his current therapy no med changes yet. He will follow in the next 6 months with another interrogation of his device. No medications changes needed at this time. Work on his diet. .  . Please call if we can assist further [phone]. Josue Franco MD, [facility] [place]

## 2018-02-12 NOTE — PROGRESS NOTES
MG tablet TAKE ONE TABLET BY MOUTH THREE TIMES A DAY 90 tablet 1    [DISCONTINUED] amiodarone (CORDARONE) 200 MG tablet TAKE ONE-HALF TABLET BY MOUTH DAILY 30 tablet 2    [DISCONTINUED] spironolactone (ALDACTONE) 25 MG tablet TAKE ONE TABLET BY MOUTH DAILY 30 tablet 4    [DISCONTINUED] isosorbide mononitrate (IMDUR) 30 MG extended release tablet TAKE ONE TABLET BY MOUTH DAILY 30 tablet 4    [DISCONTINUED] carvedilol (COREG) 25 MG tablet TAKE ONE TABLET BY MOUTH TWICE A DAY 60 tablet 4    warfarin (COUMADIN) 5 MG tablet Take as directed 60 tablet 0    predniSONE (DELTASONE) 10 MG tablet 3 pills daily x 3 days. 2 pills daily x 3 days. 1 pill daily x 3 days. 1/2 pill daily for next 3 days and stop. 20 tablet 0    HYDROcodone-acetaminophen (NORCO) 5-325 MG per tablet Take 1 tablet by mouth every 6 hours as needed for Pain .  colchicine (COLCRYS) 0.6 MG tablet Take 1 tablet by mouth daily 60 tablet 1    sodium bicarbonate POWD powder Take 0.65 g by mouth 4 times daily 1 Bottle 0    allopurinol (ZYLOPRIM) 300 MG tablet Take 300 mg by mouth daily           Review of Systems   Constitutional: Negative for activity change, appetite change, chills, diaphoresis, fever and unexpected weight change. HENT: Negative for congestion, ear pain and sore throat. Eyes: Negative for redness and itching. Respiratory: Negative for cough, chest tightness and wheezing. Cardiovascular: Negative for chest pain, palpitations and leg swelling. Gastrointestinal: Negative for abdominal pain, constipation, diarrhea, nausea and vomiting. Genitourinary: Negative for difficulty urinating, dysuria and hematuria. Musculoskeletal: Negative for back pain and joint swelling. Skin: Negative for rash. Neurological: Negative for weakness and headaches. Psychiatric/Behavioral: Negative for dysphoric mood. Objective:   Physical Exam   Constitutional: He appears well-developed and well-nourished. No distress.    HENT:

## 2018-02-12 NOTE — ASSESSMENT & PLAN NOTE
Stable. Has appt with cardiology.    -Continue current regimen and follow up with cardiology as planned. Ez Bravo

## 2018-02-13 ENCOUNTER — ANTI-COAG VISIT (OUTPATIENT)
Dept: CARDIOLOGY CLINIC | Age: 68
End: 2018-02-13

## 2018-02-13 DIAGNOSIS — I10 ESSENTIAL HYPERTENSION: ICD-10-CM

## 2018-02-13 DIAGNOSIS — I42.9 CARDIOMYOPATHY, UNSPECIFIED TYPE (HCC): ICD-10-CM

## 2018-02-13 DIAGNOSIS — Z79.01 ANTICOAGULATED ON COUMADIN: ICD-10-CM

## 2018-02-13 DIAGNOSIS — Z79.899 NEED FOR PROPHYLACTIC CHEMOTHERAPY: Primary | ICD-10-CM

## 2018-02-13 DIAGNOSIS — I48.91 ATRIAL FIBRILLATION, UNSPECIFIED TYPE (HCC): ICD-10-CM

## 2018-02-20 ENCOUNTER — TELEPHONE (OUTPATIENT)
Dept: PHARMACY | Facility: CLINIC | Age: 68
End: 2018-02-20

## 2018-02-20 NOTE — TELEPHONE ENCOUNTER
Patient referred to med [facility] clinic for warfarin mgmt; however, he prefers a blood draw rather than a fingerstick. Dr Jv Traylor office aware they will continue managing patient's anticoagulation.      Keri Duncan, PharmD, [facility]  Medication Management Clinic   [facility] Ph: [phone]  [facility] Ph: [phone]  2/20/2018 10:36 AM

## 2018-02-25 DIAGNOSIS — I10 ESSENTIAL HYPERTENSION: ICD-10-CM

## 2018-02-25 DIAGNOSIS — Z95.0 PACEMAKER: ICD-10-CM

## 2018-02-25 DIAGNOSIS — I48.0 PAROXYSMAL ATRIAL FIBRILLATION (HCC): ICD-10-CM

## 2018-02-26 ENCOUNTER — TELEPHONE (OUTPATIENT)
Dept: PRIMARY CARE CLINIC | Age: 68
End: 2018-02-26

## 2018-02-26 DIAGNOSIS — I48.0 PAROXYSMAL ATRIAL FIBRILLATION (HCC): ICD-10-CM

## 2018-02-26 DIAGNOSIS — Z95.0 PACEMAKER: ICD-10-CM

## 2018-02-26 DIAGNOSIS — I10 ESSENTIAL HYPERTENSION: ICD-10-CM

## 2018-02-26 RX ORDER — WARFARIN SODIUM 5 MG/1
TABLET ORAL
Qty: 60 TABLET | Refills: 0 | Status: SHIPPED | OUTPATIENT
Start: 2018-02-26 | End: 2018-02-27 | Stop reason: DRUGHIGH

## 2018-02-27 ENCOUNTER — ANTI-COAG VISIT (OUTPATIENT)
Dept: CARDIOLOGY CLINIC | Age: 68
End: 2018-02-27

## 2018-02-27 DIAGNOSIS — I42.9 CARDIOMYOPATHY, UNSPECIFIED TYPE (HCC): ICD-10-CM

## 2018-02-27 DIAGNOSIS — I10 ESSENTIAL HYPERTENSION: ICD-10-CM

## 2018-02-27 DIAGNOSIS — I48.91 ATRIAL FIBRILLATION, UNSPECIFIED TYPE (HCC): ICD-10-CM

## 2018-02-27 RX ORDER — HYDRALAZINE HYDROCHLORIDE 50 MG/1
50 TABLET, FILM COATED ORAL 3 TIMES DAILY
Qty: 270 TABLET | Refills: 3 | Status: SHIPPED | OUTPATIENT
Start: 2018-02-27 | End: 2018-08-20 | Stop reason: ALTCHOICE

## 2018-02-27 RX ORDER — SPIRONOLACTONE 25 MG/1
25 TABLET ORAL DAILY
Qty: 90 TABLET | Refills: 3 | Status: SHIPPED | OUTPATIENT
Start: 2018-02-27 | End: 2019-05-08

## 2018-03-05 RX ORDER — WARFARIN SODIUM 5 MG/1
TABLET ORAL
Qty: 160 TABLET | Refills: 0 | Status: SHIPPED | OUTPATIENT
Start: 2018-03-05 | End: 2018-08-20 | Stop reason: SDUPTHER

## 2018-03-13 ENCOUNTER — NURSE ONLY (OUTPATIENT)
Dept: CARDIOLOGY CLINIC | Age: 68
End: 2018-03-13

## 2018-03-13 DIAGNOSIS — I42.9 CARDIOMYOPATHY (HCC): ICD-10-CM

## 2018-03-13 DIAGNOSIS — I42.9 CARDIOMYOPATHY, UNSPECIFIED TYPE (HCC): ICD-10-CM

## 2018-03-13 DIAGNOSIS — Z95.810 ICD (IMPLANTABLE CARDIOVERTER-DEFIBRILLATOR) IN PLACE: ICD-10-CM

## 2018-03-13 PROCEDURE — 93295 DEV INTERROG REMOTE 1/2/MLT: CPT | Performed by: INTERNAL MEDICINE

## 2018-03-13 PROCEDURE — 93296 REM INTERROG EVL PM/IDS: CPT | Performed by: INTERNAL MEDICINE

## 2018-03-16 NOTE — PROGRESS NOTES
Merlin transmission shows normal device function. Battery and lead function stable. No VT or AT/AF. See report under media tab. Recheck 3 mos.  mk

## 2018-03-29 DIAGNOSIS — I10 ESSENTIAL HYPERTENSION: ICD-10-CM

## 2018-03-29 DIAGNOSIS — I42.9 CARDIOMYOPATHY, UNSPECIFIED TYPE (HCC): ICD-10-CM

## 2018-03-29 DIAGNOSIS — I48.91 ATRIAL FIBRILLATION, UNSPECIFIED TYPE (HCC): ICD-10-CM

## 2018-03-29 RX ORDER — AMIODARONE HYDROCHLORIDE 200 MG/1
TABLET ORAL
Qty: 90 TABLET | Refills: 3 | Status: SHIPPED | OUTPATIENT
Start: 2018-03-29 | End: 2019-05-07 | Stop reason: SDUPTHER

## 2018-04-02 DIAGNOSIS — I48.0 PAROXYSMAL ATRIAL FIBRILLATION (HCC): ICD-10-CM

## 2018-04-02 DIAGNOSIS — Z95.0 PACEMAKER: ICD-10-CM

## 2018-04-02 DIAGNOSIS — I10 ESSENTIAL HYPERTENSION: ICD-10-CM

## 2018-04-02 RX ORDER — WARFARIN SODIUM 5 MG/1
TABLET ORAL
Qty: 60 TABLET | Refills: 0 | Status: SHIPPED | OUTPATIENT
Start: 2018-04-02 | End: 2018-05-09 | Stop reason: SDUPTHER

## 2018-04-04 ENCOUNTER — TELEPHONE (OUTPATIENT)
Dept: ORTHOPEDIC SURGERY | Age: 68
End: 2018-04-04

## 2018-05-09 DIAGNOSIS — I10 ESSENTIAL HYPERTENSION: ICD-10-CM

## 2018-05-09 DIAGNOSIS — I48.0 PAROXYSMAL ATRIAL FIBRILLATION (HCC): ICD-10-CM

## 2018-05-09 DIAGNOSIS — Z95.0 PACEMAKER: ICD-10-CM

## 2018-05-10 RX ORDER — WARFARIN SODIUM 5 MG/1
TABLET ORAL
Qty: 14 TABLET | Refills: 0 | Status: SHIPPED | OUTPATIENT
Start: 2018-05-10 | End: 2018-05-14 | Stop reason: SDUPTHER

## 2018-05-14 DIAGNOSIS — I48.0 PAROXYSMAL ATRIAL FIBRILLATION (HCC): ICD-10-CM

## 2018-05-14 DIAGNOSIS — Z95.0 PACEMAKER: ICD-10-CM

## 2018-05-14 DIAGNOSIS — I10 ESSENTIAL HYPERTENSION: ICD-10-CM

## 2018-05-14 RX ORDER — WARFARIN SODIUM 5 MG/1
TABLET ORAL
Qty: 14 TABLET | Refills: 0 | Status: SHIPPED | OUTPATIENT
Start: 2018-05-14 | End: 2018-06-11 | Stop reason: SDUPTHER

## 2018-05-22 DIAGNOSIS — I10 ESSENTIAL HYPERTENSION: ICD-10-CM

## 2018-05-22 DIAGNOSIS — I48.91 ATRIAL FIBRILLATION, UNSPECIFIED TYPE (HCC): ICD-10-CM

## 2018-05-22 DIAGNOSIS — I42.9 CARDIOMYOPATHY, UNSPECIFIED TYPE (HCC): ICD-10-CM

## 2018-05-22 LAB
INR BLD: 3.65 (ref 0.85–1.15)
PROTHROMBIN TIME: 41.3 SEC (ref 9.6–13)

## 2018-05-23 ENCOUNTER — TELEPHONE (OUTPATIENT)
Dept: PHARMACY | Facility: CLINIC | Age: 68
End: 2018-05-23

## 2018-05-25 ENCOUNTER — OFFICE VISIT (OUTPATIENT)
Dept: PRIMARY CARE CLINIC | Age: 68
End: 2018-05-25

## 2018-05-25 VITALS — BODY MASS INDEX: 35.18 KG/M2 | SYSTOLIC BLOOD PRESSURE: 104 MMHG | WEIGHT: 274 LBS | DIASTOLIC BLOOD PRESSURE: 80 MMHG

## 2018-05-25 DIAGNOSIS — N18.4 STAGE 4 CHRONIC KIDNEY DISEASE (HCC): ICD-10-CM

## 2018-05-25 DIAGNOSIS — M10.9 GOUT, UNSPECIFIED CAUSE, UNSPECIFIED CHRONICITY, UNSPECIFIED SITE: ICD-10-CM

## 2018-05-25 DIAGNOSIS — I10 ESSENTIAL HYPERTENSION: ICD-10-CM

## 2018-05-25 DIAGNOSIS — I42.9 CARDIOMYOPATHY, UNSPECIFIED TYPE (HCC): ICD-10-CM

## 2018-05-25 DIAGNOSIS — Z11.59 ENCOUNTER FOR HEPATITIS C SCREENING TEST FOR LOW RISK PATIENT: ICD-10-CM

## 2018-05-25 DIAGNOSIS — Z00.00 HEALTH CARE MAINTENANCE: ICD-10-CM

## 2018-05-25 DIAGNOSIS — I48.91 ATRIAL FIBRILLATION, UNSPECIFIED TYPE (HCC): Primary | ICD-10-CM

## 2018-05-25 PROCEDURE — 1123F ACP DISCUSS/DSCN MKR DOCD: CPT | Performed by: INTERNAL MEDICINE

## 2018-05-25 PROCEDURE — 1036F TOBACCO NON-USER: CPT | Performed by: INTERNAL MEDICINE

## 2018-05-25 PROCEDURE — G8427 DOCREV CUR MEDS BY ELIG CLIN: HCPCS | Performed by: INTERNAL MEDICINE

## 2018-05-25 PROCEDURE — 4040F PNEUMOC VAC/ADMIN/RCVD: CPT | Performed by: INTERNAL MEDICINE

## 2018-05-25 PROCEDURE — 99214 OFFICE O/P EST MOD 30 MIN: CPT | Performed by: INTERNAL MEDICINE

## 2018-05-25 PROCEDURE — 3017F COLORECTAL CA SCREEN DOC REV: CPT | Performed by: INTERNAL MEDICINE

## 2018-05-25 PROCEDURE — G8417 CALC BMI ABV UP PARAM F/U: HCPCS | Performed by: INTERNAL MEDICINE

## 2018-05-29 ASSESSMENT — ENCOUNTER SYMPTOMS
EYE REDNESS: 0
BACK PAIN: 0
ABDOMINAL PAIN: 0
COUGH: 0
WHEEZING: 0
EYE ITCHING: 0
CONSTIPATION: 0
VOMITING: 0
CHEST TIGHTNESS: 0
NAUSEA: 0
SORE THROAT: 0
DIARRHEA: 0

## 2018-06-11 DIAGNOSIS — I48.0 PAROXYSMAL ATRIAL FIBRILLATION (HCC): ICD-10-CM

## 2018-06-11 DIAGNOSIS — I10 ESSENTIAL HYPERTENSION: ICD-10-CM

## 2018-06-11 DIAGNOSIS — Z95.0 PACEMAKER: ICD-10-CM

## 2018-06-11 RX ORDER — WARFARIN SODIUM 5 MG/1
TABLET ORAL
Qty: 60 TABLET | Refills: 1 | Status: SHIPPED | OUTPATIENT
Start: 2018-06-11 | End: 2019-03-07 | Stop reason: SDUPTHER

## 2018-06-12 ENCOUNTER — NURSE ONLY (OUTPATIENT)
Dept: CARDIOLOGY CLINIC | Age: 68
End: 2018-06-12

## 2018-06-12 DIAGNOSIS — Z95.810 ICD (IMPLANTABLE CARDIOVERTER-DEFIBRILLATOR) IN PLACE: ICD-10-CM

## 2018-06-12 DIAGNOSIS — I42.9 CARDIOMYOPATHY (HCC): ICD-10-CM

## 2018-06-12 DIAGNOSIS — I42.9 CARDIOMYOPATHY, UNSPECIFIED TYPE (HCC): ICD-10-CM

## 2018-06-12 PROCEDURE — 93296 REM INTERROG EVL PM/IDS: CPT | Performed by: INTERNAL MEDICINE

## 2018-06-12 PROCEDURE — 93295 DEV INTERROG REMOTE 1/2/MLT: CPT | Performed by: INTERNAL MEDICINE

## 2018-06-15 DIAGNOSIS — I48.91 ATRIAL FIBRILLATION, UNSPECIFIED TYPE (HCC): ICD-10-CM

## 2018-06-15 DIAGNOSIS — I42.9 CARDIOMYOPATHY, UNSPECIFIED TYPE (HCC): ICD-10-CM

## 2018-06-15 DIAGNOSIS — I10 ESSENTIAL HYPERTENSION: ICD-10-CM

## 2018-06-15 LAB
INR BLD: 3.11 (ref 0.86–1.14)
PROTHROMBIN TIME: 35.4 SEC (ref 9.8–13)

## 2018-06-18 ENCOUNTER — ANTI-COAG VISIT (OUTPATIENT)
Dept: CARDIOLOGY CLINIC | Age: 68
End: 2018-06-18

## 2018-07-13 DIAGNOSIS — I10 ESSENTIAL HYPERTENSION: ICD-10-CM

## 2018-07-13 DIAGNOSIS — I42.9 CARDIOMYOPATHY, UNSPECIFIED TYPE (HCC): ICD-10-CM

## 2018-07-13 DIAGNOSIS — I48.91 ATRIAL FIBRILLATION, UNSPECIFIED TYPE (HCC): ICD-10-CM

## 2018-07-13 LAB
INR BLD: 1.46 (ref 0.86–1.14)
PROTHROMBIN TIME: 16.7 SEC (ref 9.8–13)

## 2018-07-18 ENCOUNTER — ANTI-COAG VISIT (OUTPATIENT)
Dept: CARDIOLOGY CLINIC | Age: 68
End: 2018-07-18

## 2018-07-18 ENCOUNTER — TELEPHONE (OUTPATIENT)
Dept: CARDIOLOGY CLINIC | Age: 68
End: 2018-07-18

## 2018-08-20 ENCOUNTER — OFFICE VISIT (OUTPATIENT)
Dept: CARDIOLOGY CLINIC | Age: 68
End: 2018-08-20

## 2018-08-20 ENCOUNTER — PROCEDURE VISIT (OUTPATIENT)
Dept: CARDIOLOGY CLINIC | Age: 68
End: 2018-08-20

## 2018-08-20 VITALS
BODY MASS INDEX: 33 KG/M2 | HEART RATE: 60 BPM | WEIGHT: 257 LBS | DIASTOLIC BLOOD PRESSURE: 78 MMHG | SYSTOLIC BLOOD PRESSURE: 90 MMHG

## 2018-08-20 DIAGNOSIS — Z95.810 ICD (IMPLANTABLE CARDIOVERTER-DEFIBRILLATOR) IN PLACE: ICD-10-CM

## 2018-08-20 DIAGNOSIS — I10 ESSENTIAL HYPERTENSION: ICD-10-CM

## 2018-08-20 DIAGNOSIS — I42.9 CARDIOMYOPATHY, UNSPECIFIED TYPE (HCC): ICD-10-CM

## 2018-08-20 DIAGNOSIS — I48.91 ATRIAL FIBRILLATION, UNSPECIFIED TYPE (HCC): Primary | ICD-10-CM

## 2018-08-20 DIAGNOSIS — I48.91 ATRIAL FIBRILLATION, UNSPECIFIED TYPE (HCC): ICD-10-CM

## 2018-08-20 LAB
INR BLD: 1.27 (ref 0.86–1.14)
PROTHROMBIN TIME: 14.5 SEC (ref 9.8–13)

## 2018-08-20 PROCEDURE — 3017F COLORECTAL CA SCREEN DOC REV: CPT | Performed by: INTERNAL MEDICINE

## 2018-08-20 PROCEDURE — G8427 DOCREV CUR MEDS BY ELIG CLIN: HCPCS | Performed by: INTERNAL MEDICINE

## 2018-08-20 PROCEDURE — 1123F ACP DISCUSS/DSCN MKR DOCD: CPT | Performed by: INTERNAL MEDICINE

## 2018-08-20 PROCEDURE — 1036F TOBACCO NON-USER: CPT | Performed by: INTERNAL MEDICINE

## 2018-08-20 PROCEDURE — 99214 OFFICE O/P EST MOD 30 MIN: CPT | Performed by: INTERNAL MEDICINE

## 2018-08-20 PROCEDURE — 93284 PRGRMG EVAL IMPLANTABLE DFB: CPT | Performed by: INTERNAL MEDICINE

## 2018-08-20 PROCEDURE — 4040F PNEUMOC VAC/ADMIN/RCVD: CPT | Performed by: INTERNAL MEDICINE

## 2018-08-20 PROCEDURE — G8417 CALC BMI ABV UP PARAM F/U: HCPCS | Performed by: INTERNAL MEDICINE

## 2018-08-20 PROCEDURE — 1101F PT FALLS ASSESS-DOCD LE1/YR: CPT | Performed by: INTERNAL MEDICINE

## 2018-08-20 NOTE — PROGRESS NOTES
TAKE 2 TABLETS ( 10 MG ) 4 TIMES A WEEK OR AS DIRECTED  JOSE Morris CNP   hydrALAZINE (APRESOLINE) 50 MG tablet Take 1 tablet by mouth 3 times daily  JOSE Morris - CNP   spironolactone (ALDACTONE) 25 MG tablet Take 1 tablet by mouth daily  JOSE Morris - DERIC   isosorbide mononitrate (IMDUR) 30 MG extended release tablet TAKE ONE TABLET BY MOUTH DAILY  Jesse Leach MD   carvedilol (COREG) 25 MG tablet TAKE ONE TABLET BY MOUTH TWICE A DAY  Jesse Leach MD   warfarin (COUMADIN) 5 MG tablet Take as directed  JOSE Morris CNP   allopurinol (ZYLOPRIM) 300 MG tablet Take 300 mg by mouth daily  Historical Provider, MD     Family History  Family History   Problem Relation Age of Onset    Heart Disease Father         CHF- of    High Blood Pressure Father     Heart Disease Mother     High Blood Pressure Mother     High Blood Pressure Brother         Stent placement x2       Current Medications  Current Outpatient Prescriptions   Medication Sig Dispense Refill    warfarin (COUMADIN) 5 MG tablet TAKE 1 TABLET BY MOUTH 3 TIMES A WEEK, TAKE 2 TABLETS BY MOUTH 4 TIMES A WEEK OR AS DIRECTED 60 tablet 1    amiodarone (CORDARONE) 200 MG tablet TAKE ONE-HALF TABLET BY MOUTH DAILY 90 tablet 3    warfarin (COUMADIN) 5 MG tablet TAKE 1 TABLET ( 5 MG ) BY MOUTH 3 DAYS A WEEK , TAKE 2 TABLETS ( 10 MG ) 4 TIMES A WEEK OR AS DIRECTED 160 tablet 0    hydrALAZINE (APRESOLINE) 50 MG tablet Take 1 tablet by mouth 3 times daily 270 tablet 3    spironolactone (ALDACTONE) 25 MG tablet Take 1 tablet by mouth daily 90 tablet 3    isosorbide mononitrate (IMDUR) 30 MG extended release tablet TAKE ONE TABLET BY MOUTH DAILY 90 tablet 3    carvedilol (COREG) 25 MG tablet TAKE ONE TABLET BY MOUTH TWICE A  tablet 3    warfarin (COUMADIN) 5 MG tablet Take as directed 60 tablet 0    allopurinol (ZYLOPRIM) 300 MG tablet Take 300 mg by mouth daily       No current facility-administered No results found for: TSH  Lab Results   Component Value Date    WBC 8.8 02/26/2017     No components found for: CHLPL  Lab Results   Component Value Date    TRIG 128 02/12/2018    TRIG 143 01/06/2016     Lab Results   Component Value Date    HDL 54 02/12/2018    HDL 49 01/06/2016     Lab Results   Component Value Date    LDLCALC 109 (H) 02/12/2018    LDLCALC 123 (H) 01/06/2016     Lab Results   Component Value Date    LABVLDL 26 02/12/2018    LABVLDL 29 01/06/2016     Radiology Review:  Pertinent images / reports were reviewed as a part of this visit and reveals the following:    Last Echo: 1/11/16  Summary  Left ventricle size is normal with mild concentric left ventricular  hypertrophy. Ejection fraction is estimated to be 50-55%. Cannot rule out mild hypokinesis of the apical anterior wall. Normal right ventricular size and function. Pacer / ICD wire is visualized in the right ventricle. Mild biatrial enlargement. Sinus of valsalva appears to be mildly dilated. Mild-moderate mitral regurgitation is present. Mild pulmonic regurgitation present. There is mild tricuspid regurgitation with RVSP estimated at 35 mmHg. Signature     Last Stress Test / Angiogram:    Last ECG:  Sinus rhythm  Assessment:    Cardiomyopathy. Defibrillator. Significantly improved ejection fraction. Plan:     Continue current medicines. He is following up with his nephrologist.  No cardiac issues at this time. Francisco Javier Ferro M.D.  [facility] [place]

## 2018-09-13 ENCOUNTER — TELEPHONE (OUTPATIENT)
Dept: CARDIOLOGY CLINIC | Age: 68
End: 2018-09-13

## 2018-09-18 ENCOUNTER — NURSE ONLY (OUTPATIENT)
Dept: CARDIOLOGY CLINIC | Age: 68
End: 2018-09-18

## 2018-09-18 DIAGNOSIS — I42.9 CARDIOMYOPATHY (HCC): ICD-10-CM

## 2018-09-18 DIAGNOSIS — Z95.810 ICD (IMPLANTABLE CARDIOVERTER-DEFIBRILLATOR) IN PLACE: ICD-10-CM

## 2018-09-18 DIAGNOSIS — I42.9 CARDIOMYOPATHY, UNSPECIFIED TYPE (HCC): ICD-10-CM

## 2018-09-18 PROCEDURE — 93296 REM INTERROG EVL PM/IDS: CPT | Performed by: INTERNAL MEDICINE

## 2018-09-18 PROCEDURE — 93295 DEV INTERROG REMOTE 1/2/MLT: CPT | Performed by: INTERNAL MEDICINE

## 2018-09-26 PROBLEM — Z00.00 HEALTH CARE MAINTENANCE: Status: RESOLVED | Noted: 2017-06-30 | Resolved: 2018-09-26

## 2018-10-10 ENCOUNTER — ANTI-COAG VISIT (OUTPATIENT)
Dept: CARDIOLOGY CLINIC | Age: 68
End: 2018-10-10

## 2018-10-12 ENCOUNTER — TELEPHONE (OUTPATIENT)
Dept: PRIMARY CARE CLINIC | Age: 68
End: 2018-10-12

## 2018-10-15 RX ORDER — ALLOPURINOL 300 MG/1
300 TABLET ORAL DAILY
Qty: 90 TABLET | Refills: 1 | Status: SHIPPED | OUTPATIENT
Start: 2018-10-15

## 2018-12-18 ENCOUNTER — NURSE ONLY (OUTPATIENT)
Dept: CARDIOLOGY CLINIC | Age: 68
End: 2018-12-18
Payer: MEDICAID

## 2018-12-18 DIAGNOSIS — I42.9 CARDIOMYOPATHY, UNSPECIFIED TYPE (HCC): ICD-10-CM

## 2018-12-18 DIAGNOSIS — I42.9 CARDIOMYOPATHY (HCC): ICD-10-CM

## 2018-12-18 DIAGNOSIS — Z95.810 ICD (IMPLANTABLE CARDIOVERTER-DEFIBRILLATOR) IN PLACE: ICD-10-CM

## 2018-12-18 PROCEDURE — 93295 DEV INTERROG REMOTE 1/2/MLT: CPT | Performed by: INTERNAL MEDICINE

## 2018-12-18 PROCEDURE — 93296 REM INTERROG EVL PM/IDS: CPT | Performed by: INTERNAL MEDICINE

## 2018-12-19 ENCOUNTER — TELEPHONE (OUTPATIENT)
Dept: CARDIOLOGY CLINIC | Age: 68
End: 2018-12-19

## 2019-03-07 ENCOUNTER — OFFICE VISIT (OUTPATIENT)
Dept: CARDIOLOGY CLINIC | Age: 69
End: 2019-03-07
Payer: MEDICAID

## 2019-03-07 VITALS
DIASTOLIC BLOOD PRESSURE: 70 MMHG | HEART RATE: 60 BPM | SYSTOLIC BLOOD PRESSURE: 110 MMHG | BODY MASS INDEX: 35.95 KG/M2 | WEIGHT: 280 LBS

## 2019-03-07 DIAGNOSIS — I48.91 ATRIAL FIBRILLATION, UNSPECIFIED TYPE (HCC): ICD-10-CM

## 2019-03-07 DIAGNOSIS — I48.0 PAROXYSMAL ATRIAL FIBRILLATION (HCC): Primary | ICD-10-CM

## 2019-03-07 DIAGNOSIS — I48.0 PAROXYSMAL ATRIAL FIBRILLATION (HCC): ICD-10-CM

## 2019-03-07 DIAGNOSIS — I10 ESSENTIAL HYPERTENSION: ICD-10-CM

## 2019-03-07 DIAGNOSIS — Z95.0 PACEMAKER: ICD-10-CM

## 2019-03-07 DIAGNOSIS — I42.9 CARDIOMYOPATHY, UNSPECIFIED TYPE (HCC): ICD-10-CM

## 2019-03-07 LAB
INR BLD: 1 (ref 0.86–1.14)
PROTHROMBIN TIME: 11.4 SEC (ref 9.8–13)

## 2019-03-07 PROCEDURE — 1123F ACP DISCUSS/DSCN MKR DOCD: CPT | Performed by: INTERNAL MEDICINE

## 2019-03-07 PROCEDURE — 1036F TOBACCO NON-USER: CPT | Performed by: INTERNAL MEDICINE

## 2019-03-07 PROCEDURE — 92960 CARDIOVERSION ELECTRIC EXT: CPT | Performed by: INTERNAL MEDICINE

## 2019-03-07 PROCEDURE — 4040F PNEUMOC VAC/ADMIN/RCVD: CPT | Performed by: INTERNAL MEDICINE

## 2019-03-07 PROCEDURE — 3017F COLORECTAL CA SCREEN DOC REV: CPT | Performed by: INTERNAL MEDICINE

## 2019-03-07 PROCEDURE — 1101F PT FALLS ASSESS-DOCD LE1/YR: CPT | Performed by: INTERNAL MEDICINE

## 2019-03-07 PROCEDURE — G8417 CALC BMI ABV UP PARAM F/U: HCPCS | Performed by: INTERNAL MEDICINE

## 2019-03-07 PROCEDURE — G8428 CUR MEDS NOT DOCUMENT: HCPCS | Performed by: INTERNAL MEDICINE

## 2019-03-07 PROCEDURE — G8484 FLU IMMUNIZE NO ADMIN: HCPCS | Performed by: INTERNAL MEDICINE

## 2019-03-07 PROCEDURE — 99214 OFFICE O/P EST MOD 30 MIN: CPT | Performed by: INTERNAL MEDICINE

## 2019-03-07 RX ORDER — ALLOPURINOL 300 MG/1
300 TABLET ORAL DAILY
Qty: 90 TABLET | Refills: 1 | Status: CANCELLED | OUTPATIENT
Start: 2019-03-07

## 2019-03-07 RX ORDER — SILDENAFIL 100 MG/1
100 TABLET, FILM COATED ORAL PRN
Qty: 10 TABLET | Refills: 3 | Status: ON HOLD | OUTPATIENT
Start: 2019-03-07 | End: 2019-05-11 | Stop reason: HOSPADM

## 2019-03-07 RX ORDER — AMIODARONE HYDROCHLORIDE 200 MG/1
TABLET ORAL
Qty: 90 TABLET | Refills: 3 | Status: CANCELLED | OUTPATIENT
Start: 2019-03-07

## 2019-03-07 RX ORDER — WARFARIN SODIUM 5 MG/1
TABLET ORAL
Qty: 30 TABLET | Refills: 0 | Status: SHIPPED | OUTPATIENT
Start: 2019-03-07 | End: 2019-03-22 | Stop reason: SDUPTHER

## 2019-03-07 RX ORDER — CARVEDILOL 25 MG/1
TABLET ORAL
Qty: 180 TABLET | Refills: 3 | Status: CANCELLED | OUTPATIENT
Start: 2019-03-07

## 2019-03-07 RX ORDER — SPIRONOLACTONE 25 MG/1
25 TABLET ORAL DAILY
Qty: 90 TABLET | Refills: 3 | Status: CANCELLED | OUTPATIENT
Start: 2019-03-07

## 2019-03-07 RX ORDER — SILDENAFIL 100 MG/1
100 TABLET, FILM COATED ORAL PRN
COMMUNITY
End: 2019-03-07 | Stop reason: SDUPTHER

## 2019-03-07 RX ORDER — ISOSORBIDE MONONITRATE 30 MG/1
TABLET, EXTENDED RELEASE ORAL
Qty: 90 TABLET | Refills: 3 | Status: CANCELLED | OUTPATIENT
Start: 2019-03-07

## 2019-03-07 RX ORDER — WARFARIN SODIUM 5 MG/1
TABLET ORAL
Qty: 60 TABLET | Refills: 1 | Status: CANCELLED | OUTPATIENT
Start: 2019-03-07

## 2019-03-12 DIAGNOSIS — I42.9 CARDIOMYOPATHY, UNSPECIFIED TYPE (HCC): ICD-10-CM

## 2019-03-12 DIAGNOSIS — I48.91 ATRIAL FIBRILLATION, UNSPECIFIED TYPE (HCC): ICD-10-CM

## 2019-03-12 DIAGNOSIS — I10 ESSENTIAL HYPERTENSION: ICD-10-CM

## 2019-03-12 RX ORDER — SPIRONOLACTONE 25 MG/1
TABLET ORAL
Qty: 90 TABLET | Refills: 2 | Status: SHIPPED | OUTPATIENT
Start: 2019-03-12 | End: 2019-05-08

## 2019-03-12 RX ORDER — CARVEDILOL 25 MG/1
TABLET ORAL
Qty: 180 TABLET | Refills: 2 | Status: SHIPPED | OUTPATIENT
Start: 2019-03-12 | End: 2019-05-22 | Stop reason: SDUPTHER

## 2019-03-12 RX ORDER — ISOSORBIDE MONONITRATE 30 MG/1
TABLET, EXTENDED RELEASE ORAL
Qty: 90 TABLET | Refills: 2 | Status: SHIPPED | OUTPATIENT
Start: 2019-03-12 | End: 2019-05-22 | Stop reason: SDUPTHER

## 2019-03-13 ENCOUNTER — TELEPHONE (OUTPATIENT)
Dept: CARDIOLOGY CLINIC | Age: 69
End: 2019-03-13

## 2019-03-14 ENCOUNTER — ANTI-COAG VISIT (OUTPATIENT)
Dept: CARDIOLOGY CLINIC | Age: 69
End: 2019-03-14
Payer: MEDICAID

## 2019-03-14 DIAGNOSIS — I48.21 PERMANENT ATRIAL FIBRILLATION (HCC): ICD-10-CM

## 2019-03-14 PROCEDURE — 93793 ANTICOAG MGMT PT WARFARIN: CPT | Performed by: NURSE PRACTITIONER

## 2019-03-19 ENCOUNTER — NURSE ONLY (OUTPATIENT)
Dept: CARDIOLOGY CLINIC | Age: 69
End: 2019-03-19
Payer: COMMERCIAL

## 2019-03-19 DIAGNOSIS — I42.9 CARDIOMYOPATHY, UNSPECIFIED TYPE (HCC): ICD-10-CM

## 2019-03-19 DIAGNOSIS — I42.9 CARDIOMYOPATHY (HCC): ICD-10-CM

## 2019-03-19 DIAGNOSIS — Z95.810 ICD (IMPLANTABLE CARDIOVERTER-DEFIBRILLATOR) IN PLACE: ICD-10-CM

## 2019-03-19 PROCEDURE — 93295 DEV INTERROG REMOTE 1/2/MLT: CPT | Performed by: INTERNAL MEDICINE

## 2019-03-19 PROCEDURE — 93296 REM INTERROG EVL PM/IDS: CPT | Performed by: INTERNAL MEDICINE

## 2019-03-22 DIAGNOSIS — Z95.0 PACEMAKER: ICD-10-CM

## 2019-03-22 DIAGNOSIS — I48.0 PAROXYSMAL ATRIAL FIBRILLATION (HCC): ICD-10-CM

## 2019-03-22 DIAGNOSIS — I10 ESSENTIAL HYPERTENSION: ICD-10-CM

## 2019-03-22 RX ORDER — WARFARIN SODIUM 5 MG/1
TABLET ORAL
Qty: 60 TABLET | Refills: 0 | Status: SHIPPED | OUTPATIENT
Start: 2019-03-22 | End: 2019-04-22 | Stop reason: SDUPTHER

## 2019-03-25 ENCOUNTER — ANTI-COAG VISIT (OUTPATIENT)
Dept: CARDIOLOGY CLINIC | Age: 69
End: 2019-03-25
Payer: MEDICAID

## 2019-03-25 DIAGNOSIS — I10 ESSENTIAL HYPERTENSION: ICD-10-CM

## 2019-03-25 DIAGNOSIS — I42.9 CARDIOMYOPATHY, UNSPECIFIED TYPE (HCC): ICD-10-CM

## 2019-03-25 DIAGNOSIS — Z95.0 PACEMAKER: ICD-10-CM

## 2019-03-25 DIAGNOSIS — I48.91 ATRIAL FIBRILLATION, UNSPECIFIED TYPE (HCC): ICD-10-CM

## 2019-03-25 DIAGNOSIS — I48.0 PAROXYSMAL ATRIAL FIBRILLATION (HCC): ICD-10-CM

## 2019-03-25 LAB
INR BLD: 6.62 (ref 0.86–1.14)
PROTHROMBIN TIME: 75.5 SEC (ref 9.8–13)

## 2019-03-25 PROCEDURE — 93793 ANTICOAG MGMT PT WARFARIN: CPT | Performed by: NURSE PRACTITIONER

## 2019-04-03 DIAGNOSIS — Z95.0 PACEMAKER: ICD-10-CM

## 2019-04-03 DIAGNOSIS — I42.9 CARDIOMYOPATHY, UNSPECIFIED TYPE (HCC): ICD-10-CM

## 2019-04-03 DIAGNOSIS — I48.91 ATRIAL FIBRILLATION, UNSPECIFIED TYPE (HCC): ICD-10-CM

## 2019-04-03 DIAGNOSIS — I10 ESSENTIAL HYPERTENSION: ICD-10-CM

## 2019-04-03 DIAGNOSIS — I48.0 PAROXYSMAL ATRIAL FIBRILLATION (HCC): ICD-10-CM

## 2019-04-03 LAB
INR BLD: 4.32 (ref 0.86–1.14)
PROTHROMBIN TIME: 49.3 SEC (ref 9.8–13)

## 2019-04-05 ENCOUNTER — ANTI-COAG VISIT (OUTPATIENT)
Dept: CARDIOLOGY CLINIC | Age: 69
End: 2019-04-05
Payer: MEDICAID

## 2019-04-05 ENCOUNTER — TELEPHONE (OUTPATIENT)
Dept: CARDIOLOGY CLINIC | Age: 69
End: 2019-04-05

## 2019-04-05 DIAGNOSIS — I48.0 PAROXYSMAL ATRIAL FIBRILLATION (HCC): ICD-10-CM

## 2019-04-05 PROCEDURE — 93793 ANTICOAG MGMT PT WARFARIN: CPT | Performed by: NURSE PRACTITIONER

## 2019-04-05 NOTE — TELEPHONE ENCOUNTER
The patient called requesting to speak to [facility]. The patient would not tell me what it was regarding. I spoke to Qasim Bueno she is in clinic and will give the patient a call back.  [phone]

## 2019-04-05 NOTE — PROGRESS NOTES
ANTICOAGULATION MONITORING    Mercedes Ochoa, 1950      Anticoagulation Indication PAF non valvular    Referring Physician:   Bebeto Owen  Goal INR Range:  2/3Duration of Anticoagulation Therapy:      Lab Draw:  Product patient has at home: warfarin 5  mg    Recent INR Results:  Lab Results   Component Value Date    INR 4.32 (H) 04/03/2019    INR 6.62 (HH) 03/25/2019    INR 1.00 03/07/2019    INR 1.27 (H) 08/20/2018       INR Summary                          Warfarin regimen (mg)  Date INR A/P Sun Mon Tue Wed Thu Fri Sat Mg/wk                4/3/19 4.3 Above goals 7.5mg 7.5mg 7.5mg 7.5mg 7.5mg hold hold 37.5   3/25/19 6.6 Above goals           3/7/19 1.0 Below goal 10mg 10mg 10mg 10mg 10mg 10mg 10mg 70mg                                                  Patient's INR was 4.3 above  goal today, so patient was instructed to hold warfarin for 2 days then resume at 7.5mg qd. Patient was also reminded to maintain consistent vitamin K intake and call with any bleeding, medication changes, or fever/vomiting/diarrhea. Next INR check:  4/12/19  Patient confirmed new dosing instructions to hold coumadin today and tomorrow then 7.5mg qd    Patient advised to return call to verify INR dosing instructions. Addendum:    4/5/19  Reviewed RN assessment and plan. INR is above goal no changes in dosing. Repeat INR  3 days    Patient/family instructed by RN.      Dawson GARCIA,

## 2019-04-15 DIAGNOSIS — I48.0 PAROXYSMAL ATRIAL FIBRILLATION (HCC): ICD-10-CM

## 2019-04-15 DIAGNOSIS — I42.9 CARDIOMYOPATHY, UNSPECIFIED TYPE (HCC): ICD-10-CM

## 2019-04-15 DIAGNOSIS — I10 ESSENTIAL HYPERTENSION: ICD-10-CM

## 2019-04-15 DIAGNOSIS — Z95.0 PACEMAKER: ICD-10-CM

## 2019-04-15 DIAGNOSIS — I48.91 ATRIAL FIBRILLATION, UNSPECIFIED TYPE (HCC): ICD-10-CM

## 2019-04-15 LAB
INR BLD: 3.95 (ref 0.86–1.14)
PROTHROMBIN TIME: 45 SEC (ref 9.8–13)

## 2019-04-16 ENCOUNTER — ANTI-COAG VISIT (OUTPATIENT)
Dept: CARDIOLOGY CLINIC | Age: 69
End: 2019-04-16
Payer: MEDICAID

## 2019-04-16 DIAGNOSIS — I48.91 ATRIAL FIBRILLATION, UNSPECIFIED TYPE (HCC): ICD-10-CM

## 2019-04-16 DIAGNOSIS — I48.0 PAROXYSMAL ATRIAL FIBRILLATION (HCC): ICD-10-CM

## 2019-04-16 PROCEDURE — 93793 ANTICOAG MGMT PT WARFARIN: CPT | Performed by: NURSE PRACTITIONER

## 2019-04-22 DIAGNOSIS — I48.0 PAROXYSMAL ATRIAL FIBRILLATION (HCC): ICD-10-CM

## 2019-04-22 DIAGNOSIS — Z95.0 PACEMAKER: ICD-10-CM

## 2019-04-22 DIAGNOSIS — I10 ESSENTIAL HYPERTENSION: ICD-10-CM

## 2019-04-22 RX ORDER — WARFARIN SODIUM 5 MG/1
TABLET ORAL
Qty: 60 TABLET | Refills: 1 | Status: SHIPPED | OUTPATIENT
Start: 2019-04-22 | End: 2019-05-08 | Stop reason: CLARIF

## 2019-04-29 DIAGNOSIS — Z95.0 PACEMAKER: ICD-10-CM

## 2019-04-29 DIAGNOSIS — I48.91 ATRIAL FIBRILLATION, UNSPECIFIED TYPE (HCC): ICD-10-CM

## 2019-04-29 DIAGNOSIS — I10 ESSENTIAL HYPERTENSION: ICD-10-CM

## 2019-04-29 DIAGNOSIS — I48.0 PAROXYSMAL ATRIAL FIBRILLATION (HCC): ICD-10-CM

## 2019-04-29 DIAGNOSIS — I42.9 CARDIOMYOPATHY, UNSPECIFIED TYPE (HCC): ICD-10-CM

## 2019-04-29 LAB
INR BLD: 1.92 (ref 0.86–1.14)
PROTHROMBIN TIME: 21.9 SEC (ref 9.8–13)

## 2019-04-30 ENCOUNTER — ANTI-COAG VISIT (OUTPATIENT)
Dept: CARDIOLOGY CLINIC | Age: 69
End: 2019-04-30
Payer: COMMERCIAL

## 2019-04-30 DIAGNOSIS — I48.91 ATRIAL FIBRILLATION, UNSPECIFIED TYPE (HCC): ICD-10-CM

## 2019-04-30 PROCEDURE — 93793 ANTICOAG MGMT PT WARFARIN: CPT | Performed by: NURSE PRACTITIONER

## 2019-04-30 NOTE — PROGRESS NOTES
ANTICOAGULATION MONITORING    Viona Sever, 1950      Anticoagulation Indication PAF non valvular  Persistent   Referring Physician:   Kaelyn Casas  Goal INR Range:  2/3Duration of Anticoagulation Therapy:      Lab Draw:  Product patient has at home: warfarin 5  mg    Recent INR Results:  Lab Results   Component Value Date    INR 1.92 (H) 04/29/2019    INR 3.95 (H) 04/15/2019    INR 4.32 (H) 04/03/2019    INR 6.62 (HH) 03/25/2019       INR Summary                          Warfarin regimen (mg)  Date INR A/P Sun Mon Tue Wed Thu Fri Sat Mg/wk                             4/29/19 1.9 Below goal 7.5mg 7.5mg 7.5mg 7.5mg 7.5mg 5mg 7.5mg 50mg   4/15/19 3.9 Above goal 7.5mg 7.5mg 5mg 7.5mg 7.5mg 5mg 7.5mg 47.5   4/3/19 4.3 Above goals 7.5mg 7.5mg 7.5mg 7.5mg 7.5mg hold hold 37.5   3/25/19 6.6 Above goals           3/7/19 1.0 Below goal 10mg 10mg 10mg 10mg 10mg 10mg 10mg 70mg                                                  Patient's INR was  below goal today, so patient was instructed to take 7.5mg qd and 5mg 1 time per week  Patient was also reminded to maintain consistent vitamin K intake and call with any bleeding, medication changes, or fever/vomiting/diarrhea. Next INR check:  5/7/19  Patient confirmed new dosing instructions 5mg on  Friday and 7.5 mg all other days. Addendum:    4/30/19  Reviewed RN assessment and plan. INR is above goal no changes in dosing. Repeat INR  2 weeks   Patient/family instructed by RN.      Chong GARCIA,

## 2019-05-07 DIAGNOSIS — I10 ESSENTIAL HYPERTENSION: ICD-10-CM

## 2019-05-07 DIAGNOSIS — I48.91 ATRIAL FIBRILLATION, UNSPECIFIED TYPE (HCC): ICD-10-CM

## 2019-05-07 DIAGNOSIS — I42.9 CARDIOMYOPATHY, UNSPECIFIED TYPE (HCC): ICD-10-CM

## 2019-05-07 RX ORDER — AMIODARONE HYDROCHLORIDE 100 MG/1
TABLET ORAL
Qty: 90 TABLET | Refills: 2 | Status: SHIPPED | OUTPATIENT
Start: 2019-05-07 | End: 2019-05-22 | Stop reason: SDUPTHER

## 2019-05-08 ENCOUNTER — APPOINTMENT (OUTPATIENT)
Dept: GENERAL RADIOLOGY | Age: 69
DRG: 683 | End: 2019-05-08
Payer: COMMERCIAL

## 2019-05-08 ENCOUNTER — HOSPITAL ENCOUNTER (INPATIENT)
Age: 69
LOS: 2 days | Discharge: HOME | DRG: 683 | End: 2019-05-11
Attending: EMERGENCY MEDICINE | Admitting: FAMILY MEDICINE
Payer: COMMERCIAL

## 2019-05-08 DIAGNOSIS — N18.9 CHRONIC KIDNEY DISEASE, UNSPECIFIED CKD STAGE: ICD-10-CM

## 2019-05-08 DIAGNOSIS — R07.9 EXERTIONAL CHEST PAIN: Primary | ICD-10-CM

## 2019-05-08 PROBLEM — E87.5 HYPERKALEMIA: Status: ACTIVE | Noted: 2019-05-08

## 2019-05-08 PROBLEM — N17.9 AKI (ACUTE KIDNEY INJURY) (HCC): Status: ACTIVE | Noted: 2019-05-08

## 2019-05-08 LAB
ALBUMIN SERPL-MCNC: 4 G/DL (ref 3.4–5)
ALBUMIN SERPL-MCNC: 4.1 G/DL (ref 3.4–5)
ANION GAP SERPL CALCULATED.3IONS-SCNC: 11 MMOL/L (ref 3–16)
ANION GAP SERPL CALCULATED.3IONS-SCNC: 11 MMOL/L (ref 3–16)
ANION GAP SERPL CALCULATED.3IONS-SCNC: 12 MMOL/L (ref 3–16)
ANTI-XA UNFRAC HEPARIN: >1.8 IU/ML (ref 0.3–0.7)
APTT: >248 SEC (ref 26–36)
BASOPHILS ABSOLUTE: 0 K/UL (ref 0–0.2)
BASOPHILS RELATIVE PERCENT: 0.5 %
BUN BLDV-MCNC: 58 MG/DL (ref 7–20)
BUN BLDV-MCNC: 62 MG/DL (ref 7–20)
BUN BLDV-MCNC: 68 MG/DL (ref 7–20)
CALCIUM SERPL-MCNC: 9.6 MG/DL (ref 8.3–10.6)
CALCIUM SERPL-MCNC: 9.7 MG/DL (ref 8.3–10.6)
CALCIUM SERPL-MCNC: 9.9 MG/DL (ref 8.3–10.6)
CHLORIDE BLD-SCNC: 108 MMOL/L (ref 99–110)
CHLORIDE BLD-SCNC: 109 MMOL/L (ref 99–110)
CHLORIDE BLD-SCNC: 110 MMOL/L (ref 99–110)
CO2: 14 MMOL/L (ref 21–32)
CO2: 16 MMOL/L (ref 21–32)
CO2: 16 MMOL/L (ref 21–32)
CREAT SERPL-MCNC: 3.2 MG/DL (ref 0.8–1.3)
CREAT SERPL-MCNC: 3.4 MG/DL (ref 0.8–1.3)
CREAT SERPL-MCNC: 3.4 MG/DL (ref 0.8–1.3)
EKG ATRIAL RATE: 60 BPM
EKG DIAGNOSIS: NORMAL
EKG P AXIS: 83 DEGREES
EKG P-R INTERVAL: 122 MS
EKG Q-T INTERVAL: 464 MS
EKG QRS DURATION: 168 MS
EKG QTC CALCULATION (BAZETT): 464 MS
EKG R AXIS: -77 DEGREES
EKG T AXIS: 135 DEGREES
EKG VENTRICULAR RATE: 60 BPM
EOSINOPHILS ABSOLUTE: 0.2 K/UL (ref 0–0.6)
EOSINOPHILS RELATIVE PERCENT: 3.8 %
GFR AFRICAN AMERICAN: 22
GFR AFRICAN AMERICAN: 22
GFR AFRICAN AMERICAN: 24
GFR NON-AFRICAN AMERICAN: 18
GFR NON-AFRICAN AMERICAN: 18
GFR NON-AFRICAN AMERICAN: 19
GLUCOSE BLD-MCNC: 103 MG/DL (ref 70–99)
GLUCOSE BLD-MCNC: 129 MG/DL (ref 70–99)
GLUCOSE BLD-MCNC: 42 MG/DL (ref 70–99)
GLUCOSE BLD-MCNC: 60 MG/DL (ref 70–99)
GLUCOSE BLD-MCNC: 81 MG/DL (ref 70–99)
GLUCOSE BLD-MCNC: 83 MG/DL (ref 70–99)
GLUCOSE BLD-MCNC: 93 MG/DL (ref 70–99)
HCT VFR BLD CALC: 38.1 % (ref 40.5–52.5)
HCT VFR BLD CALC: 39.3 % (ref 40.5–52.5)
HEMOGLOBIN: 12.1 G/DL (ref 13.5–17.5)
HEMOGLOBIN: 12.2 G/DL (ref 13.5–17.5)
INR BLD: 1.27 (ref 0.86–1.14)
LYMPHOCYTES ABSOLUTE: 2.2 K/UL (ref 1–5.1)
LYMPHOCYTES RELATIVE PERCENT: 38.6 %
MAGNESIUM: 1.7 MG/DL (ref 1.8–2.4)
MCH RBC QN AUTO: 30.8 PG (ref 26–34)
MCH RBC QN AUTO: 31.2 PG (ref 26–34)
MCHC RBC AUTO-ENTMCNC: 30.8 G/DL (ref 31–36)
MCHC RBC AUTO-ENTMCNC: 32 G/DL (ref 31–36)
MCV RBC AUTO: 100.1 FL (ref 80–100)
MCV RBC AUTO: 97.3 FL (ref 80–100)
MONOCYTES ABSOLUTE: 0.5 K/UL (ref 0–1.3)
MONOCYTES RELATIVE PERCENT: 9.5 %
NEUTROPHILS ABSOLUTE: 2.7 K/UL (ref 1.7–7.7)
NEUTROPHILS RELATIVE PERCENT: 47.6 %
PDW BLD-RTO: 15.9 % (ref 12.4–15.4)
PDW BLD-RTO: 16.6 % (ref 12.4–15.4)
PERFORMED ON: ABNORMAL
PERFORMED ON: NORMAL
PHOSPHORUS: 3.4 MG/DL (ref 2.5–4.9)
PHOSPHORUS: 3.4 MG/DL (ref 2.5–4.9)
PLATELET # BLD: 117 K/UL (ref 135–450)
PLATELET # BLD: 126 K/UL (ref 135–450)
PMV BLD AUTO: 8.9 FL (ref 5–10.5)
PMV BLD AUTO: 9.3 FL (ref 5–10.5)
POTASSIUM REFLEX MAGNESIUM: 5.8 MMOL/L (ref 3.5–5.1)
POTASSIUM SERPL-SCNC: 5.8 MMOL/L (ref 3.5–5.1)
POTASSIUM SERPL-SCNC: 5.9 MMOL/L (ref 3.5–5.1)
POTASSIUM SERPL-SCNC: 6.7 MMOL/L (ref 3.5–5.1)
POTASSIUM SERPL-SCNC: 6.8 MMOL/L (ref 3.5–5.1)
PRO-BNP: 745 PG/ML (ref 0–124)
PROTHROMBIN TIME: 14.5 SEC (ref 9.8–13)
RBC # BLD: 3.91 M/UL (ref 4.2–5.9)
RBC # BLD: 3.93 M/UL (ref 4.2–5.9)
SODIUM BLD-SCNC: 133 MMOL/L (ref 136–145)
SODIUM BLD-SCNC: 136 MMOL/L (ref 136–145)
SODIUM BLD-SCNC: 138 MMOL/L (ref 136–145)
TROPONIN: 0.02 NG/ML
TROPONIN: 0.02 NG/ML
TROPONIN: <0.01 NG/ML
VITAMIN D 25-HYDROXY: 96.3 NG/ML
WBC # BLD: 5.6 K/UL (ref 4–11)
WBC # BLD: 7.4 K/UL (ref 4–11)

## 2019-05-08 PROCEDURE — 83880 ASSAY OF NATRIURETIC PEPTIDE: CPT

## 2019-05-08 PROCEDURE — 93005 ELECTROCARDIOGRAM TRACING: CPT | Performed by: EMERGENCY MEDICINE

## 2019-05-08 PROCEDURE — 84540 ASSAY OF URINE/UREA-N: CPT

## 2019-05-08 PROCEDURE — 96365 THER/PROPH/DIAG IV INF INIT: CPT

## 2019-05-08 PROCEDURE — 6370000000 HC RX 637 (ALT 250 FOR IP): Performed by: EMERGENCY MEDICINE

## 2019-05-08 PROCEDURE — 81003 URINALYSIS AUTO W/O SCOPE: CPT

## 2019-05-08 PROCEDURE — 82306 VITAMIN D 25 HYDROXY: CPT

## 2019-05-08 PROCEDURE — 2580000003 HC RX 258: Performed by: STUDENT IN AN ORGANIZED HEALTH CARE EDUCATION/TRAINING PROGRAM

## 2019-05-08 PROCEDURE — G0378 HOSPITAL OBSERVATION PER HR: HCPCS

## 2019-05-08 PROCEDURE — 84132 ASSAY OF SERUM POTASSIUM: CPT

## 2019-05-08 PROCEDURE — 84484 ASSAY OF TROPONIN QUANT: CPT

## 2019-05-08 PROCEDURE — 85610 PROTHROMBIN TIME: CPT

## 2019-05-08 PROCEDURE — 6370000000 HC RX 637 (ALT 250 FOR IP): Performed by: FAMILY MEDICINE

## 2019-05-08 PROCEDURE — 99223 1ST HOSP IP/OBS HIGH 75: CPT | Performed by: INTERNAL MEDICINE

## 2019-05-08 PROCEDURE — 83735 ASSAY OF MAGNESIUM: CPT

## 2019-05-08 PROCEDURE — 6360000002 HC RX W HCPCS: Performed by: FAMILY MEDICINE

## 2019-05-08 PROCEDURE — 96372 THER/PROPH/DIAG INJ SC/IM: CPT

## 2019-05-08 PROCEDURE — 84133 ASSAY OF URINE POTASSIUM: CPT

## 2019-05-08 PROCEDURE — 85025 COMPLETE CBC W/AUTO DIFF WBC: CPT

## 2019-05-08 PROCEDURE — 2580000003 HC RX 258: Performed by: FAMILY MEDICINE

## 2019-05-08 PROCEDURE — 85027 COMPLETE CBC AUTOMATED: CPT

## 2019-05-08 PROCEDURE — 80069 RENAL FUNCTION PANEL: CPT

## 2019-05-08 PROCEDURE — 83935 ASSAY OF URINE OSMOLALITY: CPT

## 2019-05-08 PROCEDURE — 85730 THROMBOPLASTIN TIME PARTIAL: CPT

## 2019-05-08 PROCEDURE — 84156 ASSAY OF PROTEIN URINE: CPT

## 2019-05-08 PROCEDURE — 6370000000 HC RX 637 (ALT 250 FOR IP)

## 2019-05-08 PROCEDURE — 99285 EMERGENCY DEPT VISIT HI MDM: CPT

## 2019-05-08 PROCEDURE — 6370000000 HC RX 637 (ALT 250 FOR IP): Performed by: INTERNAL MEDICINE

## 2019-05-08 PROCEDURE — 36415 COLL VENOUS BLD VENIPUNCTURE: CPT

## 2019-05-08 PROCEDURE — 85520 HEPARIN ASSAY: CPT

## 2019-05-08 PROCEDURE — 71046 X-RAY EXAM CHEST 2 VIEWS: CPT

## 2019-05-08 RX ORDER — ASPIRIN 81 MG/1
81 TABLET, CHEWABLE ORAL DAILY
Status: DISCONTINUED | OUTPATIENT
Start: 2019-05-09 | End: 2019-05-11 | Stop reason: HOSPADM

## 2019-05-08 RX ORDER — NITROGLYCERIN 0.4 MG/1
0.4 TABLET SUBLINGUAL EVERY 5 MIN PRN
Status: DISCONTINUED | OUTPATIENT
Start: 2019-05-08 | End: 2019-05-11 | Stop reason: HOSPADM

## 2019-05-08 RX ORDER — AMIODARONE HYDROCHLORIDE 100 MG/1
100 TABLET ORAL DAILY
Status: DISCONTINUED | OUTPATIENT
Start: 2019-05-09 | End: 2019-05-11 | Stop reason: HOSPADM

## 2019-05-08 RX ORDER — NITROGLYCERIN 0.4 MG/1
TABLET SUBLINGUAL
Status: COMPLETED
Start: 2019-05-08 | End: 2019-05-08

## 2019-05-08 RX ORDER — HEPARIN SODIUM 10000 [USP'U]/100ML
8 INJECTION, SOLUTION INTRAVENOUS CONTINUOUS
Status: DISCONTINUED | OUTPATIENT
Start: 2019-05-08 | End: 2019-05-11 | Stop reason: HOSPADM

## 2019-05-08 RX ORDER — SODIUM CHLORIDE 9 MG/ML
INJECTION, SOLUTION INTRAVENOUS CONTINUOUS
Status: DISCONTINUED | OUTPATIENT
Start: 2019-05-08 | End: 2019-05-09

## 2019-05-08 RX ORDER — ACETAMINOPHEN 325 MG/1
650 TABLET ORAL EVERY 4 HOURS PRN
Status: DISCONTINUED | OUTPATIENT
Start: 2019-05-08 | End: 2019-05-11 | Stop reason: HOSPADM

## 2019-05-08 RX ORDER — NICOTINE POLACRILEX 4 MG
15 LOZENGE BUCCAL PRN
Status: DISCONTINUED | OUTPATIENT
Start: 2019-05-08 | End: 2019-05-11 | Stop reason: HOSPADM

## 2019-05-08 RX ORDER — ATORVASTATIN CALCIUM 40 MG/1
40 TABLET, FILM COATED ORAL NIGHTLY
Status: DISCONTINUED | OUTPATIENT
Start: 2019-05-08 | End: 2019-05-11 | Stop reason: HOSPADM

## 2019-05-08 RX ORDER — DEXTROSE MONOHYDRATE 25 G/50ML
25 INJECTION, SOLUTION INTRAVENOUS ONCE
Status: COMPLETED | OUTPATIENT
Start: 2019-05-08 | End: 2019-05-08

## 2019-05-08 RX ORDER — DEXTROSE MONOHYDRATE 25 G/50ML
25 INJECTION, SOLUTION INTRAVENOUS PRN
Status: DISCONTINUED | OUTPATIENT
Start: 2019-05-08 | End: 2019-05-08 | Stop reason: SDUPTHER

## 2019-05-08 RX ORDER — ALLOPURINOL 300 MG/1
300 TABLET ORAL DAILY
Status: DISCONTINUED | OUTPATIENT
Start: 2019-05-09 | End: 2019-05-11 | Stop reason: HOSPADM

## 2019-05-08 RX ORDER — WARFARIN SODIUM 7.5 MG/1
7.5 TABLET ORAL DAILY
COMMUNITY
End: 2022-01-18

## 2019-05-08 RX ORDER — HEPARIN SODIUM 5000 [USP'U]/ML
80 INJECTION, SOLUTION INTRAVENOUS; SUBCUTANEOUS PRN
Status: DISCONTINUED | OUTPATIENT
Start: 2019-05-08 | End: 2019-05-11 | Stop reason: HOSPADM

## 2019-05-08 RX ORDER — HEPARIN SODIUM 5000 [USP'U]/ML
40 INJECTION, SOLUTION INTRAVENOUS; SUBCUTANEOUS PRN
Status: DISCONTINUED | OUTPATIENT
Start: 2019-05-08 | End: 2019-05-11 | Stop reason: HOSPADM

## 2019-05-08 RX ORDER — SPIRONOLACTONE 25 MG/1
25 TABLET ORAL DAILY
Status: ON HOLD | COMMUNITY
End: 2019-05-11 | Stop reason: HOSPADM

## 2019-05-08 RX ORDER — ISOSORBIDE MONONITRATE 30 MG/1
30 TABLET, EXTENDED RELEASE ORAL DAILY
Status: DISCONTINUED | OUTPATIENT
Start: 2019-05-09 | End: 2019-05-11 | Stop reason: HOSPADM

## 2019-05-08 RX ORDER — SODIUM CHLORIDE 0.9 % (FLUSH) 0.9 %
10 SYRINGE (ML) INJECTION PRN
Status: DISCONTINUED | OUTPATIENT
Start: 2019-05-08 | End: 2019-05-11 | Stop reason: HOSPADM

## 2019-05-08 RX ORDER — ONDANSETRON 2 MG/ML
4 INJECTION INTRAMUSCULAR; INTRAVENOUS EVERY 6 HOURS PRN
Status: DISCONTINUED | OUTPATIENT
Start: 2019-05-08 | End: 2019-05-11 | Stop reason: HOSPADM

## 2019-05-08 RX ORDER — DEXTROSE MONOHYDRATE 50 MG/ML
100 INJECTION, SOLUTION INTRAVENOUS PRN
Status: DISCONTINUED | OUTPATIENT
Start: 2019-05-08 | End: 2019-05-11 | Stop reason: HOSPADM

## 2019-05-08 RX ORDER — WARFARIN SODIUM 5 MG/1
5 TABLET ORAL WEEKLY
COMMUNITY
End: 2019-10-03

## 2019-05-08 RX ORDER — DEXTROSE MONOHYDRATE 25 G/50ML
12.5 INJECTION, SOLUTION INTRAVENOUS PRN
Status: DISCONTINUED | OUTPATIENT
Start: 2019-05-08 | End: 2019-05-11 | Stop reason: HOSPADM

## 2019-05-08 RX ORDER — HEPARIN SODIUM 5000 [USP'U]/ML
80 INJECTION, SOLUTION INTRAVENOUS; SUBCUTANEOUS ONCE
Status: COMPLETED | OUTPATIENT
Start: 2019-05-08 | End: 2019-05-08

## 2019-05-08 RX ORDER — CARVEDILOL 25 MG/1
25 TABLET ORAL 2 TIMES DAILY
Status: DISCONTINUED | OUTPATIENT
Start: 2019-05-08 | End: 2019-05-11 | Stop reason: HOSPADM

## 2019-05-08 RX ORDER — NITROGLYCERIN 0.4 MG/1
0.4 TABLET SUBLINGUAL EVERY 5 MIN PRN
Status: DISCONTINUED | OUTPATIENT
Start: 2019-05-08 | End: 2019-05-08 | Stop reason: SDUPTHER

## 2019-05-08 RX ORDER — SODIUM CHLORIDE 0.9 % (FLUSH) 0.9 %
10 SYRINGE (ML) INJECTION EVERY 12 HOURS SCHEDULED
Status: DISCONTINUED | OUTPATIENT
Start: 2019-05-08 | End: 2019-05-11 | Stop reason: HOSPADM

## 2019-05-08 RX ADMIN — Medication 10 ML: at 13:44

## 2019-05-08 RX ADMIN — INSULIN HUMAN 8 UNITS: 100 INJECTION, SOLUTION PARENTERAL at 13:37

## 2019-05-08 RX ADMIN — NITROGLYCERIN 0.4 MG: 0.4 TABLET, ORALLY DISINTEGRATING SUBLINGUAL at 13:55

## 2019-05-08 RX ADMIN — DEXTROSE 15 G: 15 GEL ORAL at 15:24

## 2019-05-08 RX ADMIN — HEPARIN SODIUM AND DEXTROSE 17 UNITS/KG/HR: 10000; 5 INJECTION INTRAVENOUS at 14:43

## 2019-05-08 RX ADMIN — NITROGLYCERIN 0.4 MG: 0.4 TABLET, ORALLY DISINTEGRATING SUBLINGUAL at 08:48

## 2019-05-08 RX ADMIN — SODIUM CHLORIDE: 9 INJECTION, SOLUTION INTRAVENOUS at 18:43

## 2019-05-08 RX ADMIN — SODIUM ZIRCONIUM CYCLOSILICATE 10 G: 10 POWDER, FOR SUSPENSION ORAL at 13:49

## 2019-05-08 RX ADMIN — CARVEDILOL 25 MG: 25 TABLET, FILM COATED ORAL at 20:46

## 2019-05-08 RX ADMIN — ENOXAPARIN SODIUM 30 MG: 30 INJECTION SUBCUTANEOUS at 13:38

## 2019-05-08 RX ADMIN — DEXTROSE MONOHYDRATE 25 G: 25 INJECTION, SOLUTION INTRAVENOUS at 13:38

## 2019-05-08 RX ADMIN — HEPARIN SODIUM 9800 UNITS: 5000 INJECTION INTRAVENOUS; SUBCUTANEOUS at 14:46

## 2019-05-08 RX ADMIN — CALCIUM GLUCONATE 1 G: 98 INJECTION, SOLUTION INTRAVENOUS at 13:38

## 2019-05-08 ASSESSMENT — PAIN SCALES - GENERAL
PAINLEVEL_OUTOF10: 0
PAINLEVEL_OUTOF10: 3
PAINLEVEL_OUTOF10: 2
PAINLEVEL_OUTOF10: 0
PAINLEVEL_OUTOF10: 3
PAINLEVEL_OUTOF10: 0

## 2019-05-08 ASSESSMENT — PAIN DESCRIPTION - DESCRIPTORS
DESCRIPTORS: HEAVINESS
DESCRIPTORS: HEAVINESS

## 2019-05-08 ASSESSMENT — PAIN DESCRIPTION - ORIENTATION: ORIENTATION: MID

## 2019-05-08 ASSESSMENT — PAIN DESCRIPTION - FREQUENCY
FREQUENCY: INTERMITTENT
FREQUENCY: INTERMITTENT

## 2019-05-08 ASSESSMENT — PAIN DESCRIPTION - LOCATION
LOCATION: CHEST
LOCATION: CHEST

## 2019-05-08 ASSESSMENT — ENCOUNTER SYMPTOMS
NAUSEA: 0
SHORTNESS OF BREATH: 0
VOMITING: 0
COUGH: 0
ABDOMINAL PAIN: 0

## 2019-05-08 ASSESSMENT — PAIN DESCRIPTION - PAIN TYPE
TYPE: ACUTE PAIN
TYPE: ACUTE PAIN

## 2019-05-08 NOTE — ED NOTES
Attempted to call report to receiving RN for [phone], unable to accept at present.   She will call me back     Ryan Morin RN  05/08/19 [id number]

## 2019-05-08 NOTE — ED NOTES
Report called to Orly Mancilla receiving RN for [phone]  PCT notified to transport     Marc Lay RN  05/08/19 [id number]

## 2019-05-08 NOTE — ED PROVIDER NOTES
includes Cardiac catheterization (5-2004). His family history includes Heart Disease in his father and mother; High Blood Pressure in his brother, father, and mother. He reports that he is a non-smoker but has been exposed to tobacco smoke. He has never used smokeless tobacco. He reports that he drinks alcohol. He reports that he has current or past drug history. Medications     Previous Medications    ALLOPURINOL (ZYLOPRIM) 300 MG TABLET    Take 1 tablet by mouth daily    AMIODARONE (PACERONE) 100 MG TABLET    TAKE ONE TABLET BY MOUTH DAILY    CARVEDILOL (COREG) 25 MG TABLET    TAKE ONE TABLET BY MOUTH TWICE A DAY    ISOSORBIDE MONONITRATE (IMDUR) 30 MG EXTENDED RELEASE TABLET    TAKE ONE TABLET BY MOUTH DAILY    SILDENAFIL (VIAGRA) 100 MG TABLET    Take 1 tablet by mouth as needed for Erectile Dysfunction    WARFARIN (COUMADIN) 5 MG TABLET    Take as directed    WARFARIN (COUMADIN) 5 MG TABLET    TAKE TWO TABLETS BY MOUTH DAILY       Allergies     He is allergic to peanut-containing drug products and penicillins. Physical Exam     INITIAL VITALS: BP: (!) 151/97, Temp: 97.8 °F (36.6 °C), Pulse: 60, Resp: 20, SpO2: 100 %    Physical Exam   Constitutional: He is oriented to person, place, and time. He appears well-developed and well-nourished. No distress. HENT:   Head: Normocephalic and atraumatic. Mouth/Throat: Oropharynx is clear and moist.   Eyes: Pupils are equal, round, and reactive to light. EOM are normal.   Neck: Normal range of motion. Cardiovascular: Normal rate, regular rhythm, normal heart sounds and intact distal pulses. No murmur heard. Pulmonary/Chest: Breath sounds normal. No respiratory distress. He has no wheezes. He has no rales. Abdominal: He exhibits no distension. There is no tenderness. Musculoskeletal: Normal range of motion. He exhibits no edema. Neurological: He is alert and oriented to person, place, and time. No cranial nerve deficit.  Coordination normal.   Skin: Skin is warm and dry. Nursing note and vitals reviewed. Diagnostic Results     EKG   EKG Interpretation    Interpreted by emergency department physician    Rhythm: paced  Rate: 50-60  Axis: right  Ectopy: none  Conduction: paced with wide QRS    Clinical Impression: paced rhythm, no discordance to suggest underlying MI    Iris Bald      RADIOLOGY:  XR CHEST STANDARD (2 VW)   Final Result      No active pulmonary disease or pulmonary venous congestion.                 LABS:   Results for orders placed or performed during the hospital encounter of 05/08/19   CBC Auto Differential   Result Value Ref Range    WBC 5.6 4.0 - 11.0 K/uL    RBC 3.91 (L) 4.20 - 5.90 M/uL    Hemoglobin 12.2 (L) 13.5 - 17.5 g/dL    Hematocrit 38.1 (L) 40.5 - 52.5 %    MCV 97.3 80.0 - 100.0 fL    MCH 31.2 26.0 - 34.0 pg    MCHC 32.0 31.0 - 36.0 g/dL    RDW 15.9 (H) 12.4 - 15.4 %    Platelets 513 (L) 345 - 450 K/uL    MPV 8.9 5.0 - 10.5 fL    Neutrophils % 47.6 %    Lymphocytes % 38.6 %    Monocytes % 9.5 %    Eosinophils % 3.8 %    Basophils % 0.5 %    Neutrophils # 2.7 1.7 - 7.7 K/uL    Lymphocytes # 2.2 1.0 - 5.1 K/uL    Monocytes # 0.5 0.0 - 1.3 K/uL    Eosinophils # 0.2 0.0 - 0.6 K/uL    Basophils # 0.0 0.0 - 0.2 K/uL   Basic Metabolic Panel w/ Reflex to MG   Result Value Ref Range    Sodium 138 136 - 145 mmol/L    Potassium reflex Magnesium 5.8 (H) 3.5 - 5.1 mmol/L    Chloride 110 99 - 110 mmol/L    CO2 16 (L) 21 - 32 mmol/L    Anion Gap 12 3 - 16    Glucose 83 70 - 99 mg/dL    BUN 68 (H) 7 - 20 mg/dL    CREATININE 3.4 (H) 0.8 - 1.3 mg/dL    GFR Non-African American 18 (A) >60    GFR  22 (A) >60    Calcium 9.7 8.3 - 10.6 mg/dL   Troponin   Result Value Ref Range    Troponin 0.02 (H) <0.01 ng/mL   Protime-INR   Result Value Ref Range    Protime 14.5 (H) 9.8 - 13.0 sec    INR 1.27 (H) 0.86 - 1.14   EKG 12 Lead   Result Value Ref Range    Ventricular Rate 60 BPM    Atrial Rate 60 BPM    P-R Interval 122 ms

## 2019-05-08 NOTE — H&P
[facility]    5/8/2019 11:50 AM    Patient Information:  Andria Woods is a 81 y.o. male [id number]  PCP:  Avni Patel MD (Tel: [phone] )    Chief complaint:    Chief Complaint   Patient presents with    Chest Pain     Pt reports CP since this morning. Pt reports mid-sternal CP that felt heavy with SOB. Pt denies n/v, but states he did break out into a sweat. Cardiac hx with pacemaker implanted. Cardiologist is Dr. Sherron Marin [facility]        History of Present Illness:  Garcia Benitez is a 81 y.o. male who presents with chest pain shortness breath. Patient reports that he had this one awakening this morning. Does have a history of cardiac arrhythmia and follows with Dr. Samantha Hatch. Patient is on anticoagulation chronically. Patient reports no current chest pain or shortness of breath at the moment. He does report that he occasionally has shortness of breath with exertional activities at baseline however this was up on awakening this morning. He denies palpitations. He denies fever or chills nausea or vomiting     REVIEW OF SYSTEMS:   Constitutional: Negative for fever,chills or night sweats  ENT: Negative for rhinorrhea, epistaxis, hoarseness, sore throat. Respiratory: Negative for shortness of breath,wheezing  Cardiovascular: HPI  Gastrointestinal: Negative for nausea, vomiting, diarrhea  Genitourinary: Negative for polyuria, dysuria   Hematologic/Lymphatic: Negative for bleeding tendency, easy bruising  Musculoskeletal: Negative for myalgias and arthralgias  Neurologic: Negative for confusion,dysarthria. Skin: Negative for itching,rash  Psychiatric: Negative for depression,anxiety, agitation. Endocrine: Negative for polydipsia,polyuria,heat /cold intolerance.     Past Medical History:   has a past medical history of Atrial fibrillation ([address] CHF (congestive heart failure) ([address] CKD (chronic kidney disease) stage 3, GFR 30-59 ml/min ([facility]), Fracture, Gout, Hypertension, and Pacemaker. Past Surgical History:   has a past surgical history that includes Cardiac catheterization (5-2004). Medications:  No current facility-administered medications on file prior to encounter. Current Outpatient Medications on File Prior to Encounter   Medication Sig Dispense Refill    warfarin (COUMADIN) 5 MG tablet Take 5 mg by mouth once a week      warfarin (COUMADIN) 7.5 MG tablet Take 7.5 mg by mouth daily Indications: except Saturday take 5 MG      spironolactone (ALDACTONE) 25 MG tablet Take 25 mg by mouth daily      amiodarone (PACERONE) 100 MG tablet TAKE ONE TABLET BY MOUTH DAILY 90 tablet 2    carvedilol (COREG) 25 MG tablet TAKE ONE TABLET BY MOUTH TWICE A  tablet 2    isosorbide mononitrate (IMDUR) 30 MG extended release tablet TAKE ONE TABLET BY MOUTH DAILY 90 tablet 2    allopurinol (ZYLOPRIM) 300 MG tablet Take 1 tablet by mouth daily 90 tablet 1    sildenafil (VIAGRA) 100 MG tablet Take 1 tablet by mouth as needed for Erectile Dysfunction 10 tablet 3       Allergies: Allergies   Allergen Reactions    Peanut-Containing Drug Products Anaphylaxis    Penicillins         Social History:   reports that he is a non-smoker but has been exposed to tobacco smoke. He has never used smokeless tobacco. He reports that he drinks alcohol. He reports that he has current or past drug history. Family History:  family history includes Heart Disease in his father and mother; High Blood Pressure in his brother, father, and mother. Physical Exam:  /84   Pulse 60   Temp 96.8 °F (36 °C) (Oral)   Resp 18   Ht 6' 2\" (1.88 m)   Wt 270 lb (122.5 kg)   SpO2 97%   BMI 34.67 kg/m²     General appearance:  Appears comfortable.  Well nourished  Eyes: conjunctiva clear, sclera anicteric  ENT: Moist mucus membranes  Neck:  Supple, no masses  Cardiovascular: Irregular rhythm,  tr edema in lower extremities  Respiratory: Clear to auscultation bilaterally, no wheeze, good inspiratory effort  Gastrointestinal: Abdomen soft, non-tender, not distended, normal bowel sounds  Musculoskeletal: strength symmetric  Neurology: awake and alert; no facial asymmetry, CN 2-12 appear intact  No speech or motor deficits  Psychiatry: Appropriate affect. Not agitated, cooperative  Skin: Warm, dry, no rash    Labs:  CBC:   Lab Results   Component Value Date    WBC 5.6 05/08/2019    RBC 3.91 05/08/2019    HGB 12.2 05/08/2019    HCT 38.1 05/08/2019    MCV 97.3 05/08/2019    MCH 31.2 05/08/2019    MCHC 32.0 05/08/2019    RDW 15.9 05/08/2019     05/08/2019    MPV 8.9 05/08/2019     BMP:    Lab Results   Component Value Date     05/08/2019    K 6.8 05/08/2019    K 5.8 05/08/2019     05/08/2019    CO2 16 05/08/2019    BUN 68 05/08/2019    CREATININE 3.4 05/08/2019    CALCIUM 9.7 05/08/2019    GFRAA 22 05/08/2019    LABGLOM 18 05/08/2019    GLUCOSE 83 05/08/2019         Imaging: Chest x-ray shows no acute  EKG:  Paced rhythm       Assessment/Plan:   Principal Problem:    Chest pain  Active Problems:    A-fib (HCC)    Hypertension    Cardiomyopathy ([facility]    CKD (chronic kidney disease)    BELL (acute kidney injury) ([facility]    Hyperkalemia      Patient is being admitted to the hospital for further observation and serial troponins. Cardiology has evaluated the patient and recommending a stress test.  He does have a history of atrial fibrillation. He will be on telemetry monitoring. Patient normally takes Coumadin but that is on hold in case he needs a cath. Pharmacy is dosing heparin in the interim. Patient has chronic kidney disease however his creatinine is slightly elevated beyond baseline. He also has hyperkalemia. Review of his medications reveals he is on Aldactone for some reason. Aldactone has been stopped.   His hyperkalemia was confirmed and patient will be treated with D50 insulin and calcium gluconate. Nephrology will be consulted for evaluation of his acute kidney injury on chronic kidney disease. He has followed with nephrology in the past.     [facility] as Observation. I anticipate hospitalization spanning less than two midnights for investigation and treatment of the above medically necessary diagnoses.       Lorin Alvarez MD    5/8/2019 11:50 AM

## 2019-05-08 NOTE — ED NOTES
Cardiology notified of last caffeine yesterday at 4pm,  Next troponin due Fanny Pérez [facility]  05/08/19 7511

## 2019-05-08 NOTE — CONSULTS
Patient Name: Samuel Tirado                                                    Primary Physician: Sheryle Bird, MD                  [facility] NEPHROLOGY                 Inpatient Progress Note                                         Assessment / Plan:     1) BELL on CKD stage III:   Pt has history of CKD. His BUN is 68 and Cr 3.4. (baseline Cr 2-3). - NS @100 ml/hr  - UA  - Urine (Na, K, Cl, Creatinine, Protein)  - Renal panel @4 pm  - Vit D level  - Lipid panel     2) Hyperkalemia  Pt presented in ED with K level of 6.8. He is taking Spironolactone 25 mg daily and Carvedilol 25 mg BID.   - K 6.8 on admission  - Insulin and Dextrose 5%  - Discontinue Spironolactone  - Lokelma 10 g one time  - Low K diet    3) HTN  Pt has history of HTN. He is taking Spironolactone 25 mg daily and Carvedilol 25 mg BID.   - BP [(116-151)/(82-93)]  - Discontinue Spironolactone due to hyperkalemia            Subjective:     CC:  Chest Pain    HPI: Irma Shirley is a 81 y.o. male who presents with chest pain. He had chest pain this morning when he woke up. Feels like a burning sensation and does not radiates. He has no associated difficulty breathing. No nausea or diaphoresis. He mentions his pain was so bad that he had trouble getting out of bed. States that he has been having chest pain for the last two days when he was walking or going up stairs. However, his chest pain would get better once he was resting. The pain is not currently present. ROS:  Negative unless specified above.           PMH:  Past Medical History:   Diagnosis Date    Atrial fibrillation ([address]     CHF (congestive heart failure) ([facility])     Dr. Rausch(cardiologist)    CKD (chronic kidney disease) stage 3, GFR 30-59 ml/min ([facility])     Fracture     R index finger and L ankle    Gout 2000    Hnands/feet/elbows    Hypertension     Pacemaker            Objective:     Vitals:   /84   Pulse 60   Temp 96.8 °F (36 °C) (Oral)   Resp 18   Ht 6' 2\" (1.88   --   --    K 5.8* 6.7* 6.8*     --   --    CO2 16*  --   --    BUN 68*  --   --    CREATININE 3.4*  --   --    GLUCOSE 83  --   --    MG  --  1.70*  --      Hepatic: No results for input(s): AST, ALT, ALB, BILITOT, ALKPHOS in the last 72 hours. Troponin:   Recent Labs     05/08/19  0730 05/08/19  1049   TROPONINI 0.02* 0.02*     BNP: No results for input(s): BNP in the last 72 hours. Lipids: No results for input(s): CHOL, HDL in the last 72 hours. Invalid input(s): LDLCALCU  ABGs: No results found for: PHART, PO2ART, LAN1UZC  INR:   Recent Labs     05/08/19  0730   INR 1.27*        No results found for: IRON, TIBC, FERRITIN      Lab Results   Component Value Date    CALCIUM 9.7 05/08/2019    CAION 1.25 02/26/2017         Patient was seen and examined and the case was discussed with the resident. He acted as my  Scribe. I agree with the assessment and plan.     Hyperkalemia: stopped aldactone  Lokelma, insulin, calcium and IVF was given  Will recheck K is 2-3 hours    BELL on stage 4 CKD    Thanks  Nephrology  [address], [address]  Office: [phone]  Cell: [phone]  Fax: [phone]

## 2019-05-08 NOTE — CONSULTS
[facility], [place], [facility] by [facility]           [address]       Consult hospital        Fredis Georges MD,  [address]       Cardiology                   Viona Sever  1950    May 8, 2019      CC: cp    Reason for Cardiology Consult: cp  Primary Cardiologist Shalom    Subjective: pt resting quietly, states has chest burning that wax and wanes, no radiation, no n/v, no c/o SOB edema PND or SULMA, states has waxed & waned for few days / VSS SV02 100% room air         HPI:  The patient is 81 y.o. male with c/o mid sternal burning & pressure  that has waxed & waned  no radiation, no n/v, no c/o SOB edema PND or SULMA, states has waxed & waned for few days. States it doesn't feel like heartburn     Hx : ICD: CM av paced / hx afib. CRD  / HLD   SJM CRT-D: interrogated on 3/2019  / 10% afib / placed in 2015 by Dr. Fred Cristina     EKG V paced rate 60  Trop  0.02   Potassium 5.8 / will repeat with Pro BNP & mag level now   sCr 3.4 (usually 2-3 range)   chest xray   No active pulmonary disease or pulmonary venous congestion. Reviewed most recent test with pt     Review of Systems:  Constitutional: No fatigue, weakness, night sweats or fever. HEENT: No new vision difficulties or ringing in the ears. Respiratory: No new SOB, PND, orthopnea or cough. Cardiovascular: See HPI   GI: No n/v, diarrhea, constipation, abdominal pain or changes in bowel habits. No melena, no hematochezia  : No urinary frequency, urgency, incontinence, hematuria or dysuria. Skin: No cyanosis or skin lesions. Musculoskeletal: No new muscle or joint pain. Neurological: No syncope or TIA-like symptoms.   Psychiatric: No anxiety, insomnia or depression     Past Medical History:   Diagnosis Date    Atrial fibrillation ([facility]     CHF (congestive heart failure) ([facility])     Dr. Rausch(cardiologist)    CKD (chronic kidney disease) stage 3, GFR 30-59 ml/min ([facility])     Fracture R index finger and L ankle    Gout     Hnands/feet/elbows    Hypertension     Pacemaker      Past Surgical History:   Procedure Laterality Date    CARDIAC CATHETERIZATION       Family History   Problem Relation Age of Onset    Heart Disease Father         CHF- of    High Blood Pressure Father     Heart Disease Mother     High Blood Pressure Mother     High Blood Pressure Brother         Stent placement x2     Social History     Tobacco Use    Smoking status: Passive Smoke Exposure - Never Smoker    Smokeless tobacco: Never Used   Substance Use Topics    Alcohol use: Yes     Alcohol/week: 0.0 oz     Comment: occasionally    Drug use: Yes     Comment: Quit using in        Allergies   Allergen Reactions    Peanut-Containing Drug Products Anaphylaxis    Penicillins      No current facility-administered medications for this encounter.       Current Outpatient Medications   Medication Sig Dispense Refill    amiodarone (PACERONE) 100 MG tablet TAKE ONE TABLET BY MOUTH DAILY 90 tablet 2    carvedilol (COREG) 25 MG tablet TAKE ONE TABLET BY MOUTH TWICE A  tablet 2    isosorbide mononitrate (IMDUR) 30 MG extended release tablet TAKE ONE TABLET BY MOUTH DAILY 90 tablet 2    allopurinol (ZYLOPRIM) 300 MG tablet Take 1 tablet by mouth daily 90 tablet 1    spironolactone (ALDACTONE) 25 MG tablet Take 1 tablet by mouth daily 90 tablet 3    warfarin (COUMADIN) 5 MG tablet Take as directed 60 tablet 0    warfarin (COUMADIN) 5 MG tablet TAKE TWO TABLETS BY MOUTH DAILY 60 tablet 1    spironolactone (ALDACTONE) 25 MG tablet TAKE ONE TABLET BY MOUTH DAILY 90 tablet 2    sildenafil (VIAGRA) 100 MG tablet Take 1 tablet by mouth as needed for Erectile Dysfunction 10 tablet 3       Physical Exam:  BP (!) 132/92   Pulse 62   Temp 97.8 °F (36.6 °C) (Oral)   Resp 14   Ht 6' 2\" (1.88 m)   Wt 270 lb (122.5 kg)   SpO2 100%   BMI 34.67 kg/m²   No intake or output data in the 24 hours ending 05/08/19 0841  Wt Readings from Last 2 Encounters:   05/08/19 270 lb (122.5 kg)   03/07/19 280 lb (127 kg)     Constitutional: He is oriented to person, place, and time. He appears well-developed and well-nourished. In no acute distress. Head: Normocephalic and atraumatic. Eyes: COLLEEN   Neck: Neck supple. No JVD present. Carotid bruit is not present. No mass and no thyromegaly present. No lymphadenopathy present. Cardiovascular: Normal rate, regular rhythm, normal heart sounds and intact distal pulses. Exam reveals no gallop and no friction rub. No murmur heard. Pulmonary/Chest: Effort normal and breath sounds normal. No respiratory distress. He has no wheezes, rhonchi or rales. Abdominal: Soft, non-tender. Bowel sounds and aorta are normal. He exhibits no organomegaly, mass or bruit. Extremities: No edema, cyanosis, or clubbing. Pulses are 2+ radial/carotid/dorsalis pedis and posterior tibial bilaterally. Neurological: He is alert and oriented to person, place, and time. He has normal reflexes. No cranial nerve deficit. Coordination normal.   Skin: Skin is warm and dry. There is no rash or diaphoresis. Psychiatric: He has a normal mood and affect. His speech is normal and behavior is normal.     EKG Interpretation:     Lab Review:   Lab Results   Component Value Date    TRIG 128 02/12/2018    HDL 54 02/12/2018    LDLCALC 109 02/12/2018    LABVLDL 26 02/12/2018     Lab Results   Component Value Date     05/08/2019    K 5.8 05/08/2019    BUN 68 05/08/2019    CREATININE 3.4 05/08/2019     Recent Labs     05/08/19  0730   WBC 5.6   HGB 12.2*   HCT 38.1*   *       Any Lab work EKGs stress test, angiograms, & images reviewed       Assessment:    Admitted with cp  chest burning that wax and wanes, no radiation, no n/v, no c/o SOB edema PND or SULMA, states has waxed & waned for few days   chest xray   No active pulmonary disease or pulmonary venous congestion.      Hx : ICD: CM AV paced   EKG V paced rate 60  SJM CRT-D: interrogated on 3/2019  / 10% afib / placed in 2015 by Dr. Jimmy Negrete. leak   Trop  0.02     Hyperkalemia   Potassium 5.8 / will repeat with Pro BNP & mag level now     SEN/CRI   sCr 3.4 (usually 2-3 range)     hx afib  [id number] Score for Atrial Fibrillation Stroke Risk   Risk   Factors  Component Value   C CHF Yes 1   H HTN Yes 1   A2 Age >= 76 No,  (80 y.o.) 0   D DM No 0   S2 Prior Stroke/TIA No 0   V Vascular Disease No 0   A Age 74-69 Yes,  (80 y.o.) 1   Sc Sex male 0    [id number]  Score  3   Anticoagulated INR 1.27 / on coumadin   denies bleeding falls     HLD   LDL     Home card meds coreg pacerone Imdur coumadin     Plan:  chest pain with trop 0.02 sCr 3.4 with hx CRI   lexiscan today  / LHC if necessary with caution d/t SEN    Potassium 5.8 / will repeat potassium level with Pro BNP & mag level now   Cont  coreg pacerone imdur / hold coumadin for now / may need cath   Will follow     [address] cardiology    This patient is known to me and is admitted through the emergency department with some chest discomfort. He does have progressive renal failure and is followed by Dr. Jaren Rodriguez. .  The potassium level was elevated at 5.8 and is repeated at 6.8. That is being addressed right now and he has received treatment for that and will be followed by nephrology. From a cardiac perspective he has complained of having burning sensation and pain in his chest when walking up and down the stairwell at his apartment. He is able to do 3year-old 4 sometimes 8 flights. He has shortness of breath and some dyspnea. This is been worse in the past 2-3 days and so far we do not see evidence for acute MI. Troponin level was 0.02. The serum creatinine is 3.4. I do worry about the possibility of coronary ischemia however with his renal failure and reluctant to proceed with angiography.   I would be compelled to do so if his stress nuclear study is significantly abnormal.  He is currently paced at 72/m. We will follow him closely and make further advice. Jean Carlos Schmidt M.D.  [facility]

## 2019-05-09 ENCOUNTER — APPOINTMENT (OUTPATIENT)
Dept: ULTRASOUND IMAGING | Age: 69
DRG: 683 | End: 2019-05-09
Payer: COMMERCIAL

## 2019-05-09 LAB
ALBUMIN SERPL-MCNC: 3.8 G/DL (ref 3.4–5)
ALBUMIN SERPL-MCNC: 3.9 G/DL (ref 3.4–5)
ANION GAP SERPL CALCULATED.3IONS-SCNC: 11 MMOL/L (ref 3–16)
ANION GAP SERPL CALCULATED.3IONS-SCNC: 13 MMOL/L (ref 3–16)
ANTI-XA UNFRAC HEPARIN: 1.29 IU/ML (ref 0.3–0.7)
ANTI-XA UNFRAC HEPARIN: 1.65 IU/ML (ref 0.3–0.7)
BILIRUBIN URINE: NEGATIVE
BLOOD, URINE: NEGATIVE
BUN BLDV-MCNC: 58 MG/DL (ref 7–20)
BUN BLDV-MCNC: 60 MG/DL (ref 7–20)
CALCIUM SERPL-MCNC: 9 MG/DL (ref 8.3–10.6)
CALCIUM SERPL-MCNC: 9.2 MG/DL (ref 8.3–10.6)
CHLORIDE BLD-SCNC: 108 MMOL/L (ref 99–110)
CHLORIDE BLD-SCNC: 109 MMOL/L (ref 99–110)
CHOLESTEROL, TOTAL: 150 MG/DL (ref 0–199)
CLARITY: CLEAR
CO2: 14 MMOL/L (ref 21–32)
CO2: 16 MMOL/L (ref 21–32)
COLOR: YELLOW
CREAT SERPL-MCNC: 2.7 MG/DL (ref 0.8–1.3)
CREAT SERPL-MCNC: 2.8 MG/DL (ref 0.8–1.3)
EKG ATRIAL RATE: 63 BPM
EKG ATRIAL RATE: 63 BPM
EKG ATRIAL RATE: 65 BPM
EKG DIAGNOSIS: NORMAL
EKG P AXIS: 77 DEGREES
EKG P-R INTERVAL: 146 MS
EKG P-R INTERVAL: 152 MS
EKG P-R INTERVAL: 160 MS
EKG Q-T INTERVAL: 462 MS
EKG Q-T INTERVAL: 470 MS
EKG Q-T INTERVAL: 496 MS
EKG QRS DURATION: 160 MS
EKG QRS DURATION: 164 MS
EKG QRS DURATION: 164 MS
EKG QTC CALCULATION (BAZETT): 472 MS
EKG QTC CALCULATION (BAZETT): 480 MS
EKG QTC CALCULATION (BAZETT): 515 MS
EKG R AXIS: -66 DEGREES
EKG R AXIS: -83 DEGREES
EKG R AXIS: -84 DEGREES
EKG T AXIS: -76 DEGREES
EKG T AXIS: 125 DEGREES
EKG T AXIS: 139 DEGREES
EKG VENTRICULAR RATE: 63 BPM
EKG VENTRICULAR RATE: 63 BPM
EKG VENTRICULAR RATE: 65 BPM
GFR AFRICAN AMERICAN: 27
GFR AFRICAN AMERICAN: 29
GFR NON-AFRICAN AMERICAN: 23
GFR NON-AFRICAN AMERICAN: 24
GLUCOSE BLD-MCNC: 83 MG/DL (ref 70–99)
GLUCOSE BLD-MCNC: 83 MG/DL (ref 70–99)
GLUCOSE BLD-MCNC: 85 MG/DL (ref 70–99)
GLUCOSE BLD-MCNC: 88 MG/DL (ref 70–99)
GLUCOSE BLD-MCNC: 89 MG/DL (ref 70–99)
GLUCOSE BLD-MCNC: 96 MG/DL (ref 70–99)
GLUCOSE URINE: NEGATIVE MG/DL
HCT VFR BLD CALC: 34.6 % (ref 40.5–52.5)
HDLC SERPL-MCNC: 55 MG/DL (ref 40–60)
HEMOGLOBIN: 11.3 G/DL (ref 13.5–17.5)
KETONES, URINE: NEGATIVE MG/DL
LDL CHOLESTEROL CALCULATED: 81 MG/DL
LEUKOCYTE ESTERASE, URINE: NEGATIVE
LV EF: 56 %
LVEF MODALITY: NORMAL
MAGNESIUM: 1.4 MG/DL (ref 1.8–2.4)
MCH RBC QN AUTO: 31.8 PG (ref 26–34)
MCHC RBC AUTO-ENTMCNC: 32.7 G/DL (ref 31–36)
MCV RBC AUTO: 97.1 FL (ref 80–100)
MICROSCOPIC EXAMINATION: NORMAL
NITRITE, URINE: NEGATIVE
OSMOLALITY URINE: 604 MOSM/KG (ref 390–1070)
PARATHYROID HORMONE INTACT: 63.6 PG/ML (ref 14–72)
PDW BLD-RTO: 15.9 % (ref 12.4–15.4)
PERFORMED ON: NORMAL
PH UA: 6 (ref 5–8)
PHOSPHORUS: 3 MG/DL (ref 2.5–4.9)
PHOSPHORUS: 3.4 MG/DL (ref 2.5–4.9)
PLATELET # BLD: 106 K/UL (ref 135–450)
PMV BLD AUTO: 9.1 FL (ref 5–10.5)
POTASSIUM SERPL-SCNC: 5.3 MMOL/L (ref 3.5–5.1)
POTASSIUM SERPL-SCNC: 5.4 MMOL/L (ref 3.5–5.1)
POTASSIUM, UR: 53.7 MMOL/L
PROTEIN PROTEIN: 19.3 MG/DL
PROTEIN UA: NEGATIVE MG/DL
RBC # BLD: 3.56 M/UL (ref 4.2–5.9)
SODIUM BLD-SCNC: 135 MMOL/L (ref 136–145)
SODIUM BLD-SCNC: 136 MMOL/L (ref 136–145)
SPECIFIC GRAVITY UA: 1.02 (ref 1–1.03)
T4 FREE: 0.9 NG/DL (ref 0.9–1.8)
TRIGL SERPL-MCNC: 70 MG/DL (ref 0–150)
TROPONIN: 0.02 NG/ML
TSH REFLEX: 17.31 UIU/ML (ref 0.27–4.2)
UREA NITROGEN, UR: 915.9 MG/DL (ref 800–1666)
URIC ACID, SERUM: 1.9 MG/DL (ref 3.5–7.2)
URINE REFLEX TO CULTURE: NORMAL
URINE TYPE: NORMAL
UROBILINOGEN, URINE: 0.2 E.U./DL
VLDLC SERPL CALC-MCNC: 14 MG/DL
WBC # BLD: 5.2 K/UL (ref 4–11)

## 2019-05-09 PROCEDURE — 6360000002 HC RX W HCPCS: Performed by: FAMILY MEDICINE

## 2019-05-09 PROCEDURE — 2580000003 HC RX 258: Performed by: STUDENT IN AN ORGANIZED HEALTH CARE EDUCATION/TRAINING PROGRAM

## 2019-05-09 PROCEDURE — 96366 THER/PROPH/DIAG IV INF ADDON: CPT

## 2019-05-09 PROCEDURE — 93005 ELECTROCARDIOGRAM TRACING: CPT | Performed by: FAMILY MEDICINE

## 2019-05-09 PROCEDURE — 83970 ASSAY OF PARATHORMONE: CPT

## 2019-05-09 PROCEDURE — 78452 HT MUSCLE IMAGE SPECT MULT: CPT

## 2019-05-09 PROCEDURE — 94761 N-INVAS EAR/PLS OXIMETRY MLT: CPT

## 2019-05-09 PROCEDURE — 3430000000 HC RX DIAGNOSTIC RADIOPHARMACEUTICAL: Performed by: NURSE PRACTITIONER

## 2019-05-09 PROCEDURE — 36415 COLL VENOUS BLD VENIPUNCTURE: CPT

## 2019-05-09 PROCEDURE — 80061 LIPID PANEL: CPT

## 2019-05-09 PROCEDURE — 84550 ASSAY OF BLOOD/URIC ACID: CPT

## 2019-05-09 PROCEDURE — 76770 US EXAM ABDO BACK WALL COMP: CPT

## 2019-05-09 PROCEDURE — 2500000003 HC RX 250 WO HCPCS: Performed by: INTERNAL MEDICINE

## 2019-05-09 PROCEDURE — 85027 COMPLETE CBC AUTOMATED: CPT

## 2019-05-09 PROCEDURE — 6360000002 HC RX W HCPCS: Performed by: INTERNAL MEDICINE

## 2019-05-09 PROCEDURE — 2580000003 HC RX 258: Performed by: FAMILY MEDICINE

## 2019-05-09 PROCEDURE — G0378 HOSPITAL OBSERVATION PER HR: HCPCS

## 2019-05-09 PROCEDURE — 80069 RENAL FUNCTION PANEL: CPT

## 2019-05-09 PROCEDURE — 6370000000 HC RX 637 (ALT 250 FOR IP): Performed by: FAMILY MEDICINE

## 2019-05-09 PROCEDURE — 2580000003 HC RX 258: Performed by: INTERNAL MEDICINE

## 2019-05-09 PROCEDURE — 84484 ASSAY OF TROPONIN QUANT: CPT

## 2019-05-09 PROCEDURE — 99233 SBSQ HOSP IP/OBS HIGH 50: CPT | Performed by: INTERNAL MEDICINE

## 2019-05-09 PROCEDURE — 96367 TX/PROPH/DG ADDL SEQ IV INF: CPT

## 2019-05-09 PROCEDURE — 84439 ASSAY OF FREE THYROXINE: CPT

## 2019-05-09 PROCEDURE — A9502 TC99M TETROFOSMIN: HCPCS | Performed by: NURSE PRACTITIONER

## 2019-05-09 PROCEDURE — 6370000000 HC RX 637 (ALT 250 FOR IP): Performed by: EMERGENCY MEDICINE

## 2019-05-09 PROCEDURE — 6370000000 HC RX 637 (ALT 250 FOR IP): Performed by: NURSE PRACTITIONER

## 2019-05-09 PROCEDURE — APPSS30 APP SPLIT SHARED TIME 16-30 MINUTES: Performed by: NURSE PRACTITIONER

## 2019-05-09 PROCEDURE — 2060000000 HC ICU INTERMEDIATE R&B

## 2019-05-09 PROCEDURE — 1200000000 HC SEMI PRIVATE

## 2019-05-09 PROCEDURE — 93010 ELECTROCARDIOGRAM REPORT: CPT | Performed by: INTERNAL MEDICINE

## 2019-05-09 PROCEDURE — 84443 ASSAY THYROID STIM HORMONE: CPT

## 2019-05-09 PROCEDURE — 83735 ASSAY OF MAGNESIUM: CPT

## 2019-05-09 PROCEDURE — 93005 ELECTROCARDIOGRAM TRACING: CPT | Performed by: INTERNAL MEDICINE

## 2019-05-09 PROCEDURE — 85520 HEPARIN ASSAY: CPT

## 2019-05-09 RX ORDER — MORPHINE SULFATE 2 MG/ML
2 INJECTION, SOLUTION INTRAMUSCULAR; INTRAVENOUS EVERY 4 HOURS PRN
Status: DISCONTINUED | OUTPATIENT
Start: 2019-05-09 | End: 2019-05-10

## 2019-05-09 RX ORDER — MAGNESIUM SULFATE IN WATER 40 MG/ML
2 INJECTION, SOLUTION INTRAVENOUS ONCE
Status: COMPLETED | OUTPATIENT
Start: 2019-05-09 | End: 2019-05-09

## 2019-05-09 RX ADMIN — NITROGLYCERIN 0.4 MG: 0.4 TABLET, ORALLY DISINTEGRATING SUBLINGUAL at 07:20

## 2019-05-09 RX ADMIN — ISOSORBIDE MONONITRATE 30 MG: 30 TABLET, EXTENDED RELEASE ORAL at 08:35

## 2019-05-09 RX ADMIN — MAGNESIUM OXIDE TAB 400 MG (241.3 MG ELEMENTAL MG) 400 MG: 400 (241.3 MG) TAB at 16:34

## 2019-05-09 RX ADMIN — NITROGLYCERIN 0.4 MG: 0.4 TABLET, ORALLY DISINTEGRATING SUBLINGUAL at 22:32

## 2019-05-09 RX ADMIN — MAGNESIUM SULFATE HEPTAHYDRATE 2 G: 40 INJECTION, SOLUTION INTRAVENOUS at 07:23

## 2019-05-09 RX ADMIN — CARVEDILOL 25 MG: 25 TABLET, FILM COATED ORAL at 20:22

## 2019-05-09 RX ADMIN — NITROGLYCERIN 0.4 MG: 0.4 TABLET, ORALLY DISINTEGRATING SUBLINGUAL at 04:32

## 2019-05-09 RX ADMIN — NITROGLYCERIN 0.4 MG: 0.4 TABLET, ORALLY DISINTEGRATING SUBLINGUAL at 22:37

## 2019-05-09 RX ADMIN — MORPHINE SULFATE 2 MG: 2 INJECTION, SOLUTION INTRAMUSCULAR; INTRAVENOUS at 21:10

## 2019-05-09 RX ADMIN — TETROFOSMIN 10 MILLICURIE: 1.38 INJECTION, POWDER, LYOPHILIZED, FOR SOLUTION INTRAVENOUS at 11:08

## 2019-05-09 RX ADMIN — NITROGLYCERIN 0.4 MG: 0.4 TABLET, ORALLY DISINTEGRATING SUBLINGUAL at 03:29

## 2019-05-09 RX ADMIN — Medication 10 ML: at 07:35

## 2019-05-09 RX ADMIN — Medication 10 ML: at 11:08

## 2019-05-09 RX ADMIN — NITROGLYCERIN 0.4 MG: 0.4 TABLET, ORALLY DISINTEGRATING SUBLINGUAL at 07:14

## 2019-05-09 RX ADMIN — SODIUM BICARBONATE: 84 INJECTION, SOLUTION INTRAVENOUS at 07:35

## 2019-05-09 RX ADMIN — AMIODARONE HYDROCHLORIDE 100 MG: 100 TABLET ORAL at 08:36

## 2019-05-09 RX ADMIN — CARVEDILOL 25 MG: 25 TABLET, FILM COATED ORAL at 08:35

## 2019-05-09 RX ADMIN — HEPARIN SODIUM AND DEXTROSE 14 UNITS/KG/HR: 10000; 5 INJECTION INTRAVENOUS at 03:23

## 2019-05-09 RX ADMIN — SODIUM CHLORIDE: 9 INJECTION, SOLUTION INTRAVENOUS at 05:03

## 2019-05-09 RX ADMIN — NITROGLYCERIN 0.4 MG: 0.4 TABLET, ORALLY DISINTEGRATING SUBLINGUAL at 20:54

## 2019-05-09 RX ADMIN — ATORVASTATIN CALCIUM 40 MG: 40 TABLET, FILM COATED ORAL at 20:22

## 2019-05-09 RX ADMIN — NITROGLYCERIN 0.4 MG: 0.4 TABLET, ORALLY DISINTEGRATING SUBLINGUAL at 07:09

## 2019-05-09 RX ADMIN — NITROGLYCERIN 0.4 MG: 0.4 TABLET, ORALLY DISINTEGRATING SUBLINGUAL at 21:00

## 2019-05-09 RX ADMIN — ALLOPURINOL 300 MG: 300 TABLET ORAL at 08:35

## 2019-05-09 RX ADMIN — SODIUM BICARBONATE: 84 INJECTION, SOLUTION INTRAVENOUS at 20:07

## 2019-05-09 ASSESSMENT — PAIN DESCRIPTION - PAIN TYPE
TYPE: ACUTE PAIN

## 2019-05-09 ASSESSMENT — PAIN DESCRIPTION - LOCATION
LOCATION: CHEST

## 2019-05-09 ASSESSMENT — PAIN SCALES - GENERAL
PAINLEVEL_OUTOF10: 7
PAINLEVEL_OUTOF10: 3
PAINLEVEL_OUTOF10: 0
PAINLEVEL_OUTOF10: 7
PAINLEVEL_OUTOF10: 4
PAINLEVEL_OUTOF10: 0
PAINLEVEL_OUTOF10: 10
PAINLEVEL_OUTOF10: 3

## 2019-05-09 ASSESSMENT — PAIN DESCRIPTION - DESCRIPTORS
DESCRIPTORS: ACHING
DESCRIPTORS: OTHER (COMMENT)

## 2019-05-09 ASSESSMENT — PAIN DESCRIPTION - PROGRESSION
CLINICAL_PROGRESSION: NOT CHANGED
CLINICAL_PROGRESSION: GRADUALLY IMPROVING
CLINICAL_PROGRESSION: GRADUALLY WORSENING
CLINICAL_PROGRESSION: RESOLVED

## 2019-05-09 ASSESSMENT — PAIN - FUNCTIONAL ASSESSMENT
PAIN_FUNCTIONAL_ASSESSMENT: PREVENTS OR INTERFERES SOME ACTIVE ACTIVITIES AND ADLS
PAIN_FUNCTIONAL_ASSESSMENT: ACTIVITIES ARE NOT PREVENTED
PAIN_FUNCTIONAL_ASSESSMENT: PREVENTS OR INTERFERES SOME ACTIVE ACTIVITIES AND ADLS

## 2019-05-09 ASSESSMENT — PAIN DESCRIPTION - ORIENTATION
ORIENTATION: MID

## 2019-05-09 ASSESSMENT — PAIN DESCRIPTION - FREQUENCY
FREQUENCY: INTERMITTENT

## 2019-05-09 ASSESSMENT — PAIN DESCRIPTION - ONSET
ONSET: GRADUAL

## 2019-05-09 NOTE — PROGRESS NOTES
Pt complaining of intermittent mid chest pain this AM rating 3-4. Received x2 doses of nitro with relief. EKG to be obtained stat. VSS. MD notified. Heparin drip remains infusing will monitor.

## 2019-05-09 NOTE — PROGRESS NOTES
AntiXA resulted critical at >1.80. Heparin drip held per protocol at 2208. MD Núñez notified. Will continue to monitor.

## 2019-05-09 NOTE — PLAN OF CARE
Problem: Falls - Risk of:  Goal: Will remain free from falls  Description  Will remain free from falls  Note:   Pt remained free from falls this shift. Pt bed in low position and side rails up. Call light and belongings in reach. Pt encouraged to call for assistance. Will continue with hourly rounds for PO intake, pain needs, toileting, and repositioning as needed. Problem: Pain:  Goal: Pain level will decrease  Description  Pain level will decrease  Note:   Pt had 10/10 chest pain which was relieved by nitro x3. Pt had no more complaints of pain for the remaining shift. Continue to monitor.

## 2019-05-09 NOTE — PROGRESS NOTES
Late entry:  Upon entering pts room, pt clenching chest and complaining of severe mid chest pain rating 10/10. VSS, nitro administered (see MAR). Stat EKG obtained. MD aware of elevated trop this AM. Report passed off to dayshift RN.

## 2019-05-09 NOTE — PROGRESS NOTES
Pt here from nuc med to have fran   Discussed this pt with Dr Lurdes Lemus and he decided to postpone the fran until tomorrow and [address] charge RN

## 2019-05-09 NOTE — PROGRESS NOTES
change in muscle strength, numbness or tingling. · Psychiatric: No anxiety or depression. · Endocrine: No temperature intolerance. No excessive thirst, fluid intake, or urination. No tremor. · Hematologic/Lymphatic: No abnormal bruising or bleeding, blood clots or swollen lymph nodes. · Allergic/Immunologic: No nasal congestion or hives. Objective:   /82   Pulse 61   Temp 97.4 °F (36.3 °C) (Oral)   Resp 16   Ht 6' 2\" (1.88 m)   Wt 276 lb 3.8 oz (125.3 kg)   SpO2 100%   BMI 35.47 kg/m²       Intake/Output Summary (Last 24 hours) at 5/9/2019 1400  Last data filed at 5/9/2019 1251  Gross per 24 hour   Intake 530 ml   Output 75 ml   Net 455 ml     Wt Readings from Last 3 Encounters:   05/09/19 276 lb 3.8 oz (125.3 kg)   03/07/19 280 lb (127 kg)   08/20/18 257 lb (116.6 kg)       Physical Exam:  /82   Pulse 61   Temp 97.4 °F (36.3 °C) (Oral)   Resp 16   Ht 6' 2\" (1.88 m)   Wt 276 lb 3.8 oz (125.3 kg)   SpO2 100%   BMI 35.47 kg/m²   BP Readings from Last 3 Encounters:   05/09/19 121/82   03/07/19 110/70   08/20/18 90/78     Pulse Readings from Last 3 Encounters:   05/09/19 61   03/07/19 60   08/20/18 60       Intake/Output Summary (Last 24 hours) at 5/9/2019 1400  Last data filed at 5/9/2019 1251  Gross per 24 hour   Intake 530 ml   Output 75 ml   Net 455 ml     Wt Readings from Last 2 Encounters:   05/09/19 276 lb 3.8 oz (125.3 kg)   03/07/19 280 lb (127 kg)     Constitutional: He is oriented to person, place, and time. He appears well-developed and well-nourished. In no acute distress. Head: Normocephalic and atraumatic. Eyes: PEERL   Neck: Neck supple. No JVD present. Carotid bruit is not present. No mass and no thyromegaly present. No lymphadenopathy present. Cardiovascular: Normal rate, regular rhythm, normal heart sounds and intact distal pulses. Exam reveals no gallop and no friction rub. No murmur heard.   Pulmonary/Chest: Effort normal and breath sounds normal. No respiratory distress. He has no wheezes, rhonchi or rales. Abdominal: Soft, non-tender. Bowel sounds and aorta are normal. He exhibits no organomegaly, mass or bruit. Extremities: No edema, cyanosis, or clubbing. Pulses are 2+ radial/carotid/dorsalis pedis and posterior tibial bilaterally. Neurological: He is alert and oriented to person, place, and time. He has normal reflexes. No cranial nerve deficit. Coordination normal.   Skin: Skin is warm and dry. There is no rash or diaphoresis. Psychiatric: He has a normal mood and affect. His speech is normal and behavior is normal.     Medications:    allopurinol  300 mg Oral Daily    amiodarone  100 mg Oral Daily    carvedilol  25 mg Oral BID    isosorbide mononitrate  30 mg Oral Daily    sodium chloride flush  10 mL Intravenous 2 times per day    atorvastatin  40 mg Oral Nightly    aspirin  81 mg Oral Daily      IV infusion builder 100 mL/hr at 05/09/19 0735    dextrose      heparin (porcine) 11 Units/kg/hr (05/09/19 0711)     morphine, nitroGLYCERIN, sodium chloride flush, magnesium hydroxide, ondansetron, acetaminophen, regadenoson, glucose, dextrose, glucagon (rDNA), dextrose, heparin (porcine), heparin (porcine)    Lab Data:  CBC:   Recent Labs     05/08/19  0730 05/08/19  1639 05/09/19  0538   WBC 5.6 7.4 5.2   HGB 12.2* 12.1* 11.3*   * 126* 106*     BMP:    Recent Labs     05/08/19  2051 05/09/19  0537 05/09/19  0803   * 136 135*   K 5.9* 5.4* 5.3*   CO2 14* 14* 16*   BUN 58* 60* 58*   CREATININE 3.2* 2.8* 2.7*     LIVR: No results for input(s): AST, ALT in the last 72 hours. INR:    Recent Labs     05/08/19  0730   INR 1.27*     APTT:   Recent Labs     05/08/19  1639   APTT >248.0*     BNP:  No results for input(s): BNP in the last 72 hours.     Telemetry:NSR     Reviewed  available lab work,  EKGs, images, [facility]       Assessment  Admitted with cp  chest burning that wax and wanes, no radiation, no n/v, no c/o SOB edema PND or SULMA, states has waxed & waned for few days   chest xray   No active pulmonary disease or pulmonary venous congestion.      Hx : ICD: CM AV paced   EKG V paced rate 60  SJM CRT-D: interrogated on 3/2019  / 10% afib / placed in  by Dr. Berrios Ramp    Trop. leak   Trop  0.02      Hyperkalemia   Potassium 6.8>5.8>5.3 / Dr Wen johnston    Pt was given Insulin and Dextrose 5% and Lokelma 10 g one time. SEN/CRI   sCr 3.4 (usually 2-3 range)      hx afib  [id number] Score for Atrial Fibrillation Stroke Risk    Risk   Factors   Component Value   C CHF Yes 1   H HTN Yes 1   A2 Age >= 76 No,  (80 y.o.) 0   D DM No 0   S2 Prior Stroke/TIA No 0   V Vascular Disease No 0   A Age 74-69 Yes,  (80 y.o.) 1   Sc Sex male 0     [id number]  Score   3   Anticoagulated INR 1.27 / on coumadin   denies bleeding falls     HTN   Optimal     HLD   LDL      Home card meds coreg pacerone Imdur (coumadin on hold) lipitor asa hep gtt /sodium bicarb gtt      Plan:  EK2019 Vent. rate 65 BPM  PA interval 146 ms  QRS duration 164 ms  QT/QTc 496/515 ms  SJM CRT-D:     Cont  coreg pacerone imdur / hold coumadin for now / may need cath   neph followings for SEN/CRI / sCr2.7 / potassium 5.3 / INR 1.92 / mag 1.70 / replace   To have Lexiscan & us of renals today    Echo     Addendum:  Didn't do Joslyn Tnoy today due to bicarb gtt  I spoke with Dr. Cordelia Millan and he said I could stop bicarb gtt in am to do [place] APRN, CVNP   This patient is stable today. His pain seems to have evolved such that it is mostly exertional or positional.  When he walks around the floor the pain has improved. He is due to have a Lexiscan and an echocardiogram today. Possible cardiac if significantly abnormal.  We'll follow with the report and with nephrology. Padmini Valdez M.D.  [facility]

## 2019-05-09 NOTE — PLAN OF CARE
Problem: Falls - Risk of:  Goal: Will remain free from falls  Description  Will remain free from falls  Note:   Pt has had no falls this shift. Pt bed in low position and side rails up. Call light and belongings in reach. Pt encouraged to call for assistance. Will continue with hourly rounds for PO intake, pain needs, toileting, and repositioning as needed. Problem: Pain:  Goal: Pain level will decrease  Description  Pain level will decrease  Note:   Pt had one complaint of pain. Prn sublingual nitro administered per order with pt relief.

## 2019-05-09 NOTE — CONSULTS
Patient Name: Julita Neal                                                    Primary Physician: Campbell Moura MD                  [facility] NEPHROLOGY                 Inpatient Progress Note                                         Assessment / Plan:     1) BELL on CKD stage III:   Pt has history of CKD. His BUN was 68 and Cr 3.4 on admission(baseline Cr 2-3). Pt was started on NS @ 100 ml/hr. Cr 2.8 today back to his baseline (baseline Cr 2-3). - Cr 2.8 now  - Start NaBicarb @ 100 ml/hr  - PTH level  - Renal panel q daily  - Uric acid level  - Renal ultrasound  - Lipid panel      2) Hyperkalemia  Pt presented in ED with K level of 6.8. He was taking Spironolactone 25 mg daily and Carvedilol 25 mg BID at home. Pt was given Insulin and Dextrose 5% and Lokelma 10 g one time. Spironolactone was discontinued. - K 5.4 now  - Discontinued Spironolactone  - Low K diet     3) HTN  Pt has history of HTN. He was taking Spironolactone 25 mg daily and Carvedilol 25 mg BID at home. Spironolactone was discontinued due to hyperkalemia. - BP [(114-146)/(59-93)]  - BP stable now               Subjective:     CC: Chest Pain      HPI:  Pt is 81year old male presented with chest pain. Past medical history is significant for Atrial fibrillation, CHF, CKD stage 3 (GFR 30-59 ml/min) Gout, Hypertension, and cardiac history with pacemaker implanted. Patient was seen and examined at bedside this morning. He denies having chest pain. Denies fever/chills, and nausea/vomiting.     PMH:  Past Medical History:   Diagnosis Date    Atrial fibrillation ([address]     CH[facility]congestive heart failure) [facility]     Dr. Rausch(cardiologist)    CKD (chronic kidney disease) stage 3, GFR 30-59 ml/min ([facility])     Fracture     R index finger and L ankle    Gout 2000    Hnands/feet/elbows    Hypertension     Pacemaker            Objective:     Vitals:   /88   Pulse 64   Temp 97.3 °F (36.3 °C) (Oral)   Resp 18   Ht 6' 2\" (1.88 m) Wt 276 lb 3.8 oz (125.3 kg)   SpO2 96%   BMI 35.47 kg/m²      PE:  Gen appearance: Alert and orientated X4 appears stated age and cooperative   Eyes: Eyelids,conjunctiva and pupils look normal   ENT: External inspection of the ears and nose are within normal limits             Oral mucosa  Is moist   Neuro: Grossly no focal neurological deficits, normal sensation, grossly cranial nerves intact   Neck:  No JVD, no mass, no thyroid enlargement   Cardio: S1 S2 normal, No added sounds   Resp: normal effort, clear to auscultation     GI:  Soft, non-tender, BS +          No palpable kidney, no renal angle tenderness   MS: No swollen or tender joints, no cyanosis, clubbing   DERM: no rashes, thickening   EDEMA: No LE edema    I/Os:     I/O last 3 completed shifts: In: 480 [P.O.:480]  Out: 75 [Urine:75]  I/O this shift:   In: 48 [P.O.:50]  Out: -     Intake/Output Summary (Last 24 hours) at 5/9/2019 1147  Last data filed at 5/9/2019 1000  Gross per 24 hour   Intake 530 ml   Output 75 ml   Net 455 ml       Meds:     Scheduled Meds:   allopurinol  300 mg Oral Daily    amiodarone  100 mg Oral Daily    carvedilol  25 mg Oral BID    isosorbide mononitrate  30 mg Oral Daily    sodium chloride flush  10 mL Intravenous 2 times per day    atorvastatin  40 mg Oral Nightly    aspirin  81 mg Oral Daily     Continuous Infusions:   IV infusion builder 100 mL/hr at 05/09/19 0735    dextrose      heparin (porcine) 11 Units/kg/hr (05/09/19 0711)     PRN Meds:morphine, nitroGLYCERIN, sodium chloride flush, magnesium hydroxide, ondansetron, acetaminophen, regadenoson, glucose, dextrose, glucagon (rDNA), dextrose, heparin (porcine), heparin (porcine)    Diet:  Diet NPO, After Midnight    Labs:    CBC:   Recent Labs     05/08/19  0730 05/08/19  1639 05/09/19  0538   WBC 5.6 7.4 5.2   HGB 12.2* 12.1* 11.3*   HCT 38.1* 39.3* 34.6*   * 126* 106*     BMP:    Recent Labs     05/08/19  0934  05/08/19 2051 05/09/19  7233

## 2019-05-09 NOTE — PROGRESS NOTES
[facility] [place]    5/9/2019 1:48 PM        Name: Panda Laird . Admitted: 5/8/2019  Primary Care Provider: Galo Sanders MD (Tel: [phone])      Subjective:      Patient is resting in bed. He was taken down for stress test this morning. He only completed part of it. He reports he is to return tomorrow to finish the stress test.  Discussed the case with nephrology and he placed him on a bicarb drip. Reviewed interval ancillary notes    Objective:  /82   Pulse 61   Temp 97.4 °F (36.3 °C) (Oral)   Resp 16   Ht 6' 2\" (1.88 m)   Wt 276 lb 3.8 oz (125.3 kg)   SpO2 100%   BMI 35.47 kg/m²     Intake/Output Summary (Last 24 hours) at 5/9/2019 1348  Last data filed at 5/9/2019 1251  Gross per 24 hour   Intake 530 ml   Output 75 ml   Net 455 ml      Wt Readings from Last 3 Encounters:   05/09/19 276 lb 3.8 oz (125.3 kg)   03/07/19 280 lb (127 kg)   08/20/18 257 lb (116.6 kg)       General appearance:  Appears comfortable, no distress  Head:  Atraumatic normocephalic  Neck: supple  Cardiovascular: Regular rhythm, normal S1, S2. No murmur. No edema in lower extremities  Respiratory: Not using accessory muscles. Good inspiratory effort. Clear to auscultation bilaterally, no wheeze or crackles. GI: Abdomen soft, no tenderness, not distended, normal bowel sounds  Musculoskeletal: strength symmetric  Neurology: awake and alert, cooperative      Labs and Tests:  CBC:   Recent Labs     05/08/19  0730 05/08/19  1639 05/09/19  0538   WBC 5.6 7.4 5.2   HGB 12.2* 12.1* 11.3*   * 126* 106*     BMP:    Recent Labs     05/08/19  2051 05/09/19  0537 05/09/19  0803   * 136 135*   K 5.9* 5.4* 5.3*    109 108   CO2 14* 14* 16*   BUN 58* 60* 58*   CREATININE 3.2* 2.8* 2.7*   GLUCOSE 93 88 83     Hepatic: No results for input(s): AST, ALT, ALB, BILITOT, ALKPHOS in the last 72 hours.     Assessment & Plan:   Principal Problem:    Exertional chest pain  Active Problems:    A-fib (HCC)    Hypertension    Cardiomyopathy ([facility] [address]    CKD (chronic kidney disease)    EBLL (acute kidney injury) ([facility] [address]    Hyperkalemia  Metabolic acidosis    Patient is currently on a bicarb drip. Patient has been noncompliant as an outpatient with sodium bicarb tablets. His Coumadin was held as there was concern she may need a heart catheterization. He was placed on heparin IV for the short-term. On discharge he will be able to resume his Coumadin therapy. Patient's renal function is somewhat better today. His acidosis is improved with the bicarb drip. I discussed case with nephrology was to continue his bicarb drip till tomorrow. He will likely be able to be discharged tomorrow if his cardiac workup is negative.     Diet: Diet NPO, After Midnight  Code:Full Code  DVT PPX heparin    Kit Grant MD   5/9/2019 1:48 PM

## 2019-05-09 NOTE — PROGRESS NOTES
Spoke with Jaylene Kelley RN the pt's floor nurse who has confirmed with Ellen NP and she wants to go ahead with today's Lydia

## 2019-05-10 ENCOUNTER — APPOINTMENT (OUTPATIENT)
Dept: CT IMAGING | Age: 69
DRG: 683 | End: 2019-05-10
Payer: COMMERCIAL

## 2019-05-10 ENCOUNTER — APPOINTMENT (OUTPATIENT)
Dept: GENERAL RADIOLOGY | Age: 69
DRG: 683 | End: 2019-05-10
Payer: COMMERCIAL

## 2019-05-10 ENCOUNTER — APPOINTMENT (OUTPATIENT)
Dept: CARDIAC CATH/INVASIVE PROCEDURES | Age: 69
DRG: 683 | End: 2019-05-10
Payer: COMMERCIAL

## 2019-05-10 LAB
ALBUMIN SERPL-MCNC: 3.4 G/DL (ref 3.4–5)
ALBUMIN SERPL-MCNC: 3.9 G/DL (ref 3.4–5)
ANION GAP SERPL CALCULATED.3IONS-SCNC: 11 MMOL/L (ref 3–16)
ANION GAP SERPL CALCULATED.3IONS-SCNC: 13 MMOL/L (ref 3–16)
ANION GAP SERPL CALCULATED.3IONS-SCNC: 13 MMOL/L (ref 3–16)
ANTI-XA UNFRAC HEPARIN: 0.37 IU/ML (ref 0.3–0.7)
ANTI-XA UNFRAC HEPARIN: 0.52 IU/ML (ref 0.3–0.7)
ANTI-XA UNFRAC HEPARIN: 0.59 IU/ML (ref 0.3–0.7)
BASOPHILS ABSOLUTE: 0 K/UL (ref 0–0.2)
BASOPHILS RELATIVE PERCENT: 0.5 %
BUN BLDV-MCNC: 42 MG/DL (ref 7–20)
BUN BLDV-MCNC: 43 MG/DL (ref 7–20)
BUN BLDV-MCNC: 47 MG/DL (ref 7–20)
CALCIUM SERPL-MCNC: 8.9 MG/DL (ref 8.3–10.6)
CALCIUM SERPL-MCNC: 9.6 MG/DL (ref 8.3–10.6)
CALCIUM SERPL-MCNC: 9.7 MG/DL (ref 8.3–10.6)
CHLORIDE BLD-SCNC: 104 MMOL/L (ref 99–110)
CHLORIDE BLD-SCNC: 106 MMOL/L (ref 99–110)
CHLORIDE BLD-SCNC: 107 MMOL/L (ref 99–110)
CO2: 17 MMOL/L (ref 21–32)
CO2: 18 MMOL/L (ref 21–32)
CO2: 18 MMOL/L (ref 21–32)
CREAT SERPL-MCNC: 2.4 MG/DL (ref 0.8–1.3)
CREAT SERPL-MCNC: 2.4 MG/DL (ref 0.8–1.3)
CREAT SERPL-MCNC: 2.5 MG/DL (ref 0.8–1.3)
D DIMER: <200 NG/ML DDU (ref 0–229)
EOSINOPHILS ABSOLUTE: 0.1 K/UL (ref 0–0.6)
EOSINOPHILS RELATIVE PERCENT: 2.6 %
GFR AFRICAN AMERICAN: 31
GFR AFRICAN AMERICAN: 33
GFR AFRICAN AMERICAN: 33
GFR NON-AFRICAN AMERICAN: 26
GFR NON-AFRICAN AMERICAN: 27
GFR NON-AFRICAN AMERICAN: 27
GLUCOSE BLD-MCNC: 106 MG/DL (ref 70–99)
GLUCOSE BLD-MCNC: 79 MG/DL (ref 70–99)
GLUCOSE BLD-MCNC: 85 MG/DL (ref 70–99)
GLUCOSE BLD-MCNC: 85 MG/DL (ref 70–99)
GLUCOSE BLD-MCNC: 89 MG/DL (ref 70–99)
GLUCOSE BLD-MCNC: 90 MG/DL (ref 70–99)
GLUCOSE BLD-MCNC: 94 MG/DL (ref 70–99)
HCT VFR BLD CALC: 38.5 % (ref 40.5–52.5)
HEMOGLOBIN: 12.2 G/DL (ref 13.5–17.5)
INR BLD: 1.12 (ref 0.86–1.14)
LACTIC ACID: 1.3 MMOL/L (ref 0.4–2)
LEFT VENTRICULAR EJECTION FRACTION HIGH VALUE: 35 %
LEFT VENTRICULAR EJECTION FRACTION MODE: NORMAL
LV EF: 30 %
LYMPHOCYTES ABSOLUTE: 1.9 K/UL (ref 1–5.1)
LYMPHOCYTES RELATIVE PERCENT: 33.9 %
MAGNESIUM: 1.8 MG/DL (ref 1.8–2.4)
MCH RBC QN AUTO: 30.9 PG (ref 26–34)
MCHC RBC AUTO-ENTMCNC: 31.6 G/DL (ref 31–36)
MCV RBC AUTO: 97.5 FL (ref 80–100)
MONOCYTES ABSOLUTE: 0.8 K/UL (ref 0–1.3)
MONOCYTES RELATIVE PERCENT: 13.9 %
NEUTROPHILS ABSOLUTE: 2.7 K/UL (ref 1.7–7.7)
NEUTROPHILS RELATIVE PERCENT: 49.1 %
PDW BLD-RTO: 16.3 % (ref 12.4–15.4)
PERFORMED ON: ABNORMAL
PERFORMED ON: NORMAL
PHOSPHORUS: 2.4 MG/DL (ref 2.5–4.9)
PHOSPHORUS: 2.9 MG/DL (ref 2.5–4.9)
PLATELET # BLD: 126 K/UL (ref 135–450)
PMV BLD AUTO: 9 FL (ref 5–10.5)
POTASSIUM SERPL-SCNC: 5.1 MMOL/L (ref 3.5–5.1)
POTASSIUM SERPL-SCNC: 5.4 MMOL/L (ref 3.5–5.1)
POTASSIUM SERPL-SCNC: 5.5 MMOL/L (ref 3.5–5.1)
PROTHROMBIN TIME: 12.8 SEC (ref 9.8–13)
RBC # BLD: 3.94 M/UL (ref 4.2–5.9)
SODIUM BLD-SCNC: 135 MMOL/L (ref 136–145)
SODIUM BLD-SCNC: 135 MMOL/L (ref 136–145)
SODIUM BLD-SCNC: 137 MMOL/L (ref 136–145)
TROPONIN: 0.01 NG/ML
TROPONIN: 0.01 NG/ML
TROPONIN: 0.02 NG/ML
WBC # BLD: 5.5 K/UL (ref 4–11)

## 2019-05-10 PROCEDURE — 4A023N7 MEASUREMENT OF CARDIAC SAMPLING AND PRESSURE, LEFT HEART, PERCUTANEOUS APPROACH: ICD-10-PCS | Performed by: INTERNAL MEDICINE

## 2019-05-10 PROCEDURE — 6360000002 HC RX W HCPCS: Performed by: STUDENT IN AN ORGANIZED HEALTH CARE EDUCATION/TRAINING PROGRAM

## 2019-05-10 PROCEDURE — 2580000003 HC RX 258: Performed by: INTERNAL MEDICINE

## 2019-05-10 PROCEDURE — 2500000003 HC RX 250 WO HCPCS

## 2019-05-10 PROCEDURE — 93005 ELECTROCARDIOGRAM TRACING: CPT | Performed by: FAMILY MEDICINE

## 2019-05-10 PROCEDURE — C1894 INTRO/SHEATH, NON-LASER: HCPCS

## 2019-05-10 PROCEDURE — 85379 FIBRIN DEGRADATION QUANT: CPT

## 2019-05-10 PROCEDURE — 6360000002 HC RX W HCPCS: Performed by: FAMILY MEDICINE

## 2019-05-10 PROCEDURE — 6360000002 HC RX W HCPCS: Performed by: NURSE PRACTITIONER

## 2019-05-10 PROCEDURE — 94761 N-INVAS EAR/PLS OXIMETRY MLT: CPT

## 2019-05-10 PROCEDURE — 2060000000 HC ICU INTERMEDIATE R&B

## 2019-05-10 PROCEDURE — 78452 HT MUSCLE IMAGE SPECT MULT: CPT

## 2019-05-10 PROCEDURE — 85520 HEPARIN ASSAY: CPT

## 2019-05-10 PROCEDURE — 6370000000 HC RX 637 (ALT 250 FOR IP): Performed by: FAMILY MEDICINE

## 2019-05-10 PROCEDURE — 6360000002 HC RX W HCPCS: Performed by: INTERNAL MEDICINE

## 2019-05-10 PROCEDURE — 84484 ASSAY OF TROPONIN QUANT: CPT

## 2019-05-10 PROCEDURE — 6370000000 HC RX 637 (ALT 250 FOR IP): Performed by: EMERGENCY MEDICINE

## 2019-05-10 PROCEDURE — 80069 RENAL FUNCTION PANEL: CPT

## 2019-05-10 PROCEDURE — A9502 TC99M TETROFOSMIN: HCPCS | Performed by: NURSE PRACTITIONER

## 2019-05-10 PROCEDURE — 93017 CV STRESS TEST TRACING ONLY: CPT

## 2019-05-10 PROCEDURE — 2700000000 HC OXYGEN THERAPY PER DAY

## 2019-05-10 PROCEDURE — 2580000003 HC RX 258: Performed by: FAMILY MEDICINE

## 2019-05-10 PROCEDURE — 93452 LEFT HRT CATH W/VENTRCLGRPHY: CPT | Performed by: INTERNAL MEDICINE

## 2019-05-10 PROCEDURE — 71045 X-RAY EXAM CHEST 1 VIEW: CPT

## 2019-05-10 PROCEDURE — 99233 SBSQ HOSP IP/OBS HIGH 50: CPT | Performed by: INTERNAL MEDICINE

## 2019-05-10 PROCEDURE — 2580000003 HC RX 258: Performed by: STUDENT IN AN ORGANIZED HEALTH CARE EDUCATION/TRAINING PROGRAM

## 2019-05-10 PROCEDURE — 85025 COMPLETE CBC W/AUTO DIFF WBC: CPT

## 2019-05-10 PROCEDURE — 83735 ASSAY OF MAGNESIUM: CPT

## 2019-05-10 PROCEDURE — APPSS30 APP SPLIT SHARED TIME 16-30 MINUTES: Performed by: NURSE PRACTITIONER

## 2019-05-10 PROCEDURE — 85610 PROTHROMBIN TIME: CPT

## 2019-05-10 PROCEDURE — C1760 CLOSURE DEV, VASC: HCPCS

## 2019-05-10 PROCEDURE — B2151ZZ FLUOROSCOPY OF LEFT HEART USING LOW OSMOLAR CONTRAST: ICD-10-PCS | Performed by: INTERNAL MEDICINE

## 2019-05-10 PROCEDURE — 99152 MOD SED SAME PHYS/QHP 5/>YRS: CPT

## 2019-05-10 PROCEDURE — 2500000003 HC RX 250 WO HCPCS: Performed by: INTERNAL MEDICINE

## 2019-05-10 PROCEDURE — B2111ZZ FLUOROSCOPY OF MULTIPLE CORONARY ARTERIES USING LOW OSMOLAR CONTRAST: ICD-10-PCS | Performed by: INTERNAL MEDICINE

## 2019-05-10 PROCEDURE — 6370000000 HC RX 637 (ALT 250 FOR IP): Performed by: NURSE PRACTITIONER

## 2019-05-10 PROCEDURE — 6360000002 HC RX W HCPCS

## 2019-05-10 PROCEDURE — C1769 GUIDE WIRE: HCPCS

## 2019-05-10 PROCEDURE — 6370000000 HC RX 637 (ALT 250 FOR IP): Performed by: INTERNAL MEDICINE

## 2019-05-10 PROCEDURE — 36415 COLL VENOUS BLD VENIPUNCTURE: CPT

## 2019-05-10 PROCEDURE — 3430000000 HC RX DIAGNOSTIC RADIOPHARMACEUTICAL: Performed by: NURSE PRACTITIONER

## 2019-05-10 PROCEDURE — 99153 MOD SED SAME PHYS/QHP EA: CPT

## 2019-05-10 PROCEDURE — 6360000004 HC RX CONTRAST MEDICATION: Performed by: INTERNAL MEDICINE

## 2019-05-10 PROCEDURE — 93458 L HRT ARTERY/VENTRICLE ANGIO: CPT

## 2019-05-10 PROCEDURE — 83605 ASSAY OF LACTIC ACID: CPT

## 2019-05-10 PROCEDURE — 2709999900 HC NON-CHARGEABLE SUPPLY

## 2019-05-10 RX ORDER — ACETYLCYSTEINE 200 MG/ML
600 SOLUTION ORAL; RESPIRATORY (INHALATION) 2 TIMES DAILY
Status: DISCONTINUED | OUTPATIENT
Start: 2019-05-10 | End: 2019-05-11

## 2019-05-10 RX ORDER — MORPHINE SULFATE 2 MG/ML
2 INJECTION, SOLUTION INTRAMUSCULAR; INTRAVENOUS ONCE
Status: DISCONTINUED | OUTPATIENT
Start: 2019-05-10 | End: 2019-05-11 | Stop reason: HOSPADM

## 2019-05-10 RX ORDER — MORPHINE SULFATE 2 MG/ML
INJECTION, SOLUTION INTRAMUSCULAR; INTRAVENOUS
Status: COMPLETED
Start: 2019-05-10 | End: 2019-05-10

## 2019-05-10 RX ORDER — ACETYLCYSTEINE 200 MG/ML
600 SOLUTION ORAL; RESPIRATORY (INHALATION) 2 TIMES DAILY
Status: DISCONTINUED | OUTPATIENT
Start: 2019-05-10 | End: 2019-05-10

## 2019-05-10 RX ORDER — HYDRALAZINE HYDROCHLORIDE 20 MG/ML
10 INJECTION INTRAMUSCULAR; INTRAVENOUS EVERY 6 HOURS PRN
Status: DISCONTINUED | OUTPATIENT
Start: 2019-05-10 | End: 2019-05-11 | Stop reason: HOSPADM

## 2019-05-10 RX ORDER — SODIUM CHLORIDE 9 MG/ML
INJECTION, SOLUTION INTRAVENOUS CONTINUOUS
Status: ACTIVE | OUTPATIENT
Start: 2019-05-10 | End: 2019-05-10

## 2019-05-10 RX ORDER — AMLODIPINE BESYLATE 10 MG/1
10 TABLET ORAL DAILY
Status: DISCONTINUED | OUTPATIENT
Start: 2019-05-10 | End: 2019-05-11

## 2019-05-10 RX ORDER — MORPHINE SULFATE 2 MG/ML
2 INJECTION, SOLUTION INTRAMUSCULAR; INTRAVENOUS ONCE
Status: COMPLETED | OUTPATIENT
Start: 2019-05-10 | End: 2019-05-10

## 2019-05-10 RX ORDER — HYDROMORPHONE HYDROCHLORIDE 2 MG/1
1 TABLET ORAL EVERY 4 HOURS PRN
Status: DISCONTINUED | OUTPATIENT
Start: 2019-05-10 | End: 2019-05-11 | Stop reason: HOSPADM

## 2019-05-10 RX ADMIN — MORPHINE SULFATE 2 MG: 2 INJECTION, SOLUTION INTRAMUSCULAR; INTRAVENOUS at 13:39

## 2019-05-10 RX ADMIN — ONDANSETRON 4 MG: 2 INJECTION INTRAMUSCULAR; INTRAVENOUS at 13:45

## 2019-05-10 RX ADMIN — AMLODIPINE BESYLATE 10 MG: 10 TABLET ORAL at 18:42

## 2019-05-10 RX ADMIN — HYDRALAZINE HYDROCHLORIDE 10 MG: 20 INJECTION INTRAMUSCULAR; INTRAVENOUS at 17:09

## 2019-05-10 RX ADMIN — ISOSORBIDE MONONITRATE 30 MG: 30 TABLET, EXTENDED RELEASE ORAL at 08:50

## 2019-05-10 RX ADMIN — HEPARIN SODIUM AND DEXTROSE 8 UNITS/KG/HR: 10000; 5 INJECTION INTRAVENOUS at 02:40

## 2019-05-10 RX ADMIN — ACETYLCYSTEINE 600 MG: 200 SOLUTION ORAL; RESPIRATORY (INHALATION) at 20:50

## 2019-05-10 RX ADMIN — CARVEDILOL 25 MG: 25 TABLET, FILM COATED ORAL at 20:50

## 2019-05-10 RX ADMIN — SODIUM BICARBONATE: 84 INJECTION, SOLUTION INTRAVENOUS at 06:30

## 2019-05-10 RX ADMIN — REGADENOSON 0.4 MG: 0.08 INJECTION, SOLUTION INTRAVENOUS at 11:55

## 2019-05-10 RX ADMIN — AMIODARONE HYDROCHLORIDE 100 MG: 100 TABLET ORAL at 08:50

## 2019-05-10 RX ADMIN — ATORVASTATIN CALCIUM 40 MG: 40 TABLET, FILM COATED ORAL at 20:50

## 2019-05-10 RX ADMIN — NITROGLYCERIN 0.4 MG: 0.4 TABLET, ORALLY DISINTEGRATING SUBLINGUAL at 12:59

## 2019-05-10 RX ADMIN — SODIUM CHLORIDE: 900 INJECTION, SOLUTION INTRAVENOUS at 13:49

## 2019-05-10 RX ADMIN — ALLOPURINOL 300 MG: 300 TABLET ORAL at 08:50

## 2019-05-10 RX ADMIN — MAGNESIUM OXIDE TAB 400 MG (241.3 MG ELEMENTAL MG) 400 MG: 400 (241.3 MG) TAB at 20:50

## 2019-05-10 RX ADMIN — TETROFOSMIN 30 MILLICURIE: 1.38 INJECTION, POWDER, LYOPHILIZED, FOR SOLUTION INTRAVENOUS at 11:54

## 2019-05-10 RX ADMIN — HEPARIN SODIUM AND DEXTROSE 8 UNITS/KG/HR: 10000; 5 INJECTION INTRAVENOUS at 15:37

## 2019-05-10 RX ADMIN — IOPAMIDOL 300 ML: 755 INJECTION, SOLUTION INTRAVENOUS at 14:51

## 2019-05-10 RX ADMIN — MAGNESIUM OXIDE TAB 400 MG (241.3 MG ELEMENTAL MG) 400 MG: 400 (241.3 MG) TAB at 08:50

## 2019-05-10 RX ADMIN — MORPHINE SULFATE 2 MG: 2 INJECTION, SOLUTION INTRAMUSCULAR; INTRAVENOUS at 13:10

## 2019-05-10 RX ADMIN — Medication 10 ML: at 08:50

## 2019-05-10 RX ADMIN — MORPHINE SULFATE 2 MG: 2 INJECTION, SOLUTION INTRAMUSCULAR; INTRAVENOUS at 13:17

## 2019-05-10 RX ADMIN — Medication 10 ML: at 11:54

## 2019-05-10 RX ADMIN — ASPIRIN 81 MG 81 MG: 81 TABLET ORAL at 08:49

## 2019-05-10 RX ADMIN — NITROGLYCERIN 0.4 MG: 0.4 TABLET, ORALLY DISINTEGRATING SUBLINGUAL at 12:54

## 2019-05-10 ASSESSMENT — PAIN DESCRIPTION - ORIENTATION
ORIENTATION: MID
ORIENTATION: MID

## 2019-05-10 ASSESSMENT — PAIN DESCRIPTION - FREQUENCY
FREQUENCY: CONTINUOUS
FREQUENCY: INTERMITTENT
FREQUENCY: CONTINUOUS

## 2019-05-10 ASSESSMENT — PAIN DESCRIPTION - ONSET
ONSET: GRADUAL

## 2019-05-10 ASSESSMENT — PAIN SCALES - GENERAL
PAINLEVEL_OUTOF10: 10
PAINLEVEL_OUTOF10: 4
PAINLEVEL_OUTOF10: 0
PAINLEVEL_OUTOF10: 10
PAINLEVEL_OUTOF10: 2
PAINLEVEL_OUTOF10: 0
PAINLEVEL_OUTOF10: 3
PAINLEVEL_OUTOF10: 8
PAINLEVEL_OUTOF10: 0

## 2019-05-10 ASSESSMENT — PAIN DESCRIPTION - DESCRIPTORS
DESCRIPTORS: ACHING
DESCRIPTORS: SHARP;ACHING
DESCRIPTORS: ACHING

## 2019-05-10 ASSESSMENT — PAIN DESCRIPTION - PROGRESSION
CLINICAL_PROGRESSION: NOT CHANGED
CLINICAL_PROGRESSION: NOT CHANGED
CLINICAL_PROGRESSION: GRADUALLY IMPROVING
CLINICAL_PROGRESSION: NOT CHANGED

## 2019-05-10 ASSESSMENT — PAIN DESCRIPTION - LOCATION
LOCATION: CHEST

## 2019-05-10 ASSESSMENT — PAIN DESCRIPTION - PAIN TYPE
TYPE: ACUTE PAIN

## 2019-05-10 ASSESSMENT — PAIN - FUNCTIONAL ASSESSMENT
PAIN_FUNCTIONAL_ASSESSMENT: ACTIVITIES ARE NOT PREVENTED
PAIN_FUNCTIONAL_ASSESSMENT: ACTIVITIES ARE NOT PREVENTED
PAIN_FUNCTIONAL_ASSESSMENT: PREVENTS OR INTERFERES SOME ACTIVE ACTIVITIES AND ADLS

## 2019-05-10 NOTE — PROCEDURES
CARDIOLOGY CATH LAB NOTE  [facility]       Left heart catheterization with coronary angiography    Manda Morales  81 y.o.  [id number]  5/10/2019, 3:52 PM  Referring MD : Ronaldo Nunez MD  Procedure Coronary Angiogram   Left heart cath  Selective coronary angiogram  Left ventriculogram  Right ileofemoral angiogram  Closure device (Angioseal)      Indication: Cardiac cath to rule out ischemic CAD, Possible angioplasty, The procedure and risks described to patient including risk of CVA, MI, bleeding, emergency surgery, death, this patient with recurring severe chest pains left chest and down the left arm. Some features suggesting ischemia and a new abnormal stress nuclear study. , Consent signed or positive stress test  Anesthesia: Moderate sedation with Versed and Fentanyl IV  Estimated blood loss :minimal        After informed consent was obtained and  History and exam reviewed the patient was taken to the cardiac cath lab. The patient had the right groin prepped and draped in sterile fashion. The groin was anesthetized with 2% Xylocaine. Using a thin walled needle the right femoral artery was entered and and .035mm guide wire was inserted. A 5 Ukrainian arterial introducer was advanced through the needle and the wire was removed. The sheath was aspirated and flushed with normal saline. A 5 left Concepcion catheter was advanced through the sheath over a guide wire and advanced under fluoroscopic guidance into the ascending aorta. The wire was withdrawn and the catheter was aspirated and flushed with normal saline. The catheter was then advanced into the ostium of the left coronary artery under fluoroscopic guidance. Multiple cine images were obtained in different projections of the left coronary artery. The catheter was then withdrawn. A JR4 catheter was subsequently advanced  Through the sheath over a wire and advanced under fluoroscopic guidance in to the ascending aorta.  The guide wire was removed and the catheter was aspirated and flushed and subsequently advanced in to the ostium of the right coronary artery. Multiple cine images were then obtained of the right coronary artery. The catheter was then withdrawn. A pigtail catheter was then advanced through the sheath over a guide wire and advanced in to the ascending aorta. The wire was then used to help prolapse the catheter across the aortic valve. Once the catheter was across the valve the wire was withdrawn and the catheter was aspirated and flushed with normal saline. Pressure measurements of the left ventricle were then obtained. Then catheter was then connected to a mechanical injection device for contrast ventriculography that was performed in an MCINTOSH projection. The catheter was then reconnected to a pressure system for a pullback pressure analysis of the aortic valve. The catheter had a guide wire placed in it and the catheter was then withdraw. The right ileofemoral sheath was injected and the vessel imaged    Hemostasis was obtained by Angioseal/ manual pressure      Complications: None      Estimated blood loss: Minimal      Sedation: Fentanyl 50 micrograms                  Versed 2 mg      Angiographic Findings:  Left Main: Normal    Left Anterior Descending: Tortuosity with mild proximal calcification but no significant stenosis. There is a large septal  that has some ostial narrowing of 60-70%. Circumflex: Large vessel probably codominant. Mild plaquing but no significant stenoses. Right Coronary: The vessel is probably codominant. Mild plaque but no significant stenosis. Left Ventriculogram: Ejection fraction 30-35%. Anterior wall apex is akinetic suggesting regional wall motion abnormality but we do not find a coronary lesion that would cause this problem.     Right ileofemoral: Normal.          Hemodynamics:   Left Ventricular Pressure: 8  Central Aortic Pressure: 156    Assessment: Mild coronary artery disease but does not require intervention. Cardiomyopathy ischemic appearing with regional wall motion in the anterior wall and apex akinesia and reduced ejection fraction    Recommendations: Optimize medical care continue dual antiplatelet therapy. The patient is currently on heparin and we will look to see if there is pulmonary emboli. A d-dimer is pending. We will hydrate the patient and given that he has renal failure and creatinine was 2.4.       This note was likely completed using voice recognition technology and may contain unintended errors  Azevedo M.D., Oscar Edouard; Andrzej Samayoa MD

## 2019-05-10 NOTE — ANESTHESIA PRE-OP
Rate Last Dose    HYDROmorphone (DILAUDID) tablet 1 mg  1 mg Oral Q4H PRN Sidra Zapata MD        0.9 % sodium chloride infusion   Intravenous Continuous Bibi Roblero  mL/hr at 05/10/19 1349      morphine (PF) injection 2 mg  2 mg Intravenous Once Bibi MD Annika        aluminum & magnesium hydroxide-simethicone (MAALOX) 30 mL, lidocaine viscous (XYLOCAINE) 5 mL (GI COCKTAIL)   Oral Once Bibi MD Annika   Stopped at 05/10/19 1347    acetylcysteine (MUCOMYST) 20 % solution 600 mg  600 mg Oral BID Moustapha Dickey MD        hydrALAZINE (APRESOLINE) injection 10 mg  10 mg Intravenous Q6H PRN Aviva Gilliam MD        magnesium oxide (MAG-OX) tablet 400 mg  400 mg Oral BID Yobani Ring, APRN - CNP   400 mg at 05/10/19 0850    perflutren lipid microspheres (DEFINITY) injection 1.65 mg  1.5 mL Intravenous ONCE PRN Yobani Mendoza, APRN - CNP        nitroGLYCERIN (NITROSTAT) SL tablet 0.4 mg  0.4 mg Sublingual Q5 Min PRN Denia Damon MD   0.4 mg at 05/10/19 1259    allopurinol (ZYLOPRIM) tablet 300 mg  300 mg Oral Daily Sidra Zapata MD   300 mg at 05/10/19 0850    amiodarone (PACERONE) tablet 100 mg  100 mg Oral Daily Sidra Zapata MD   100 mg at 05/10/19 0850    carvedilol (COREG) tablet 25 mg  25 mg Oral BID Sidra Zapata MD   25 mg at 05/09/19 2022    isosorbide mononitrate (IMDUR) extended release tablet 30 mg  30 mg Oral Daily Sidra Zapata MD   30 mg at 05/10/19 0850    sodium chloride flush 0.9 % injection 10 mL  10 mL Intravenous 2 times per day Sidra Zapata MD   10 mL at 05/10/19 0850    sodium chloride flush 0.9 % injection 10 mL  10 mL Intravenous PRN Sidra Zapata MD   10 mL at 05/10/19 1154    magnesium hydroxide (MILK OF MAGNESIA) 400 MG/5ML suspension 30 mL  30 mL Oral Daily PRN Sidra Zapata MD        ondansetron [facility]) injection 4 mg  4 mg Intravenous Q6H PRN Sidra Zapata MD   4 mg at 05/10/19 1345    atorvastatin (LIPITOR) tablet 40 mg  40 mg Oral Nightly Akira Cuevas MD   40 mg at 05/09/19 2022    aspirin chewable tablet 81 mg  81 mg Oral Daily Akira Cuevas MD   81 mg at 05/10/19 0849    acetaminophen (TYLENOL) tablet 650 mg  650 mg Oral Q4H PRN Akira Cuevas MD        glucose (GLUTOSE) 40 % oral gel 15 g  15 g Oral PRN Heath Sandifer, MD   15 g at 05/08/19 1524    dextrose 50 % solution 12.5 g  12.5 g Intravenous PRN Heath Sandifer, MD        glucagon (rDNA) injection 1 mg  1 mg Intramuscular PRN Heath Sandifer, MD        dextrose 5 % solution  100 mL/hr Intravenous PRN Heath Sandifer, MD        heparin (porcine) injection 9,800 Units  80 Units/kg Intravenous PRN Akira Cuevas MD        heparin (porcine) injection 4,900 Units  40 Units/kg Intravenous PRN Akira Cuevas MD        heparin 25,000 units in dextrose 5% 250 mL infusion  8 Units/kg/hr Intravenous Continuous Akira Cuevas MD 9.8 mL/hr at 05/10/19 1537 8 Units/kg/hr at 05/10/19 1537           Pre-Sedation:    Pre-Sedation Documentation and Exam:  I have personally completed a history, physical exam & review of systems for this patient (see notes). Prior History of Anesthesia Complications:   none    Modified Mallampati:  I (soft palate, uvula, fauces, tonsillar pillars visible)    ASA Classification:  Class 3 - A patient with severe systemic disease that limits activity but is not incapacitating and Class 2 -- A normal healthy patient with mild systemic disease    Janeth Scale: Activity:  2 - Able to move 4 extremities voluntarily on command  Respiration:  2 - Able to breathe deeply and cough freely  Circulation:  2 - BP+/- 20mmHg of normal  Consciousness:  2 - Fully awake  Oxygen Saturation (color):  2 - Able to maintain oxygen saturation >92% on room air    Sedation/Anesthesia Plan:  Guard the patient's safety and welfare. Minimize physical discomfort and pain.   Minimize negative psychological responses to treatment by providing sedation and analgesia and maximize the potential amnesia. Patient to meet pre-procedure discharge plan.     Medication Planned:  midazolam intravenously and midazolam intravenously, fentanyl intravenously    Patient is an appropriate candidate for plan of sedation: yes      Electronically signed by Car Fields MD on 5/10/2019 at 3:50 PM

## 2019-05-10 NOTE — CONSULTS
Fracture     R index finger and L ankle    Gout 2000    Hnands/feet/elbows    Hypertension     Pacemaker            Objective:     Vitals:   BP (!) 153/98   Pulse 53   Temp 97.7 °F (36.5 °C) (Oral)   Resp 18   Ht 6' 2\" (1.88 m)   Wt 278 lb 3.5 oz (126.2 kg)   SpO2 100%   BMI 35.72 kg/m²      PE:  Gen appearance: Alert and orientated Osman Marychuy stated age and cooperative   Eyes: Eyelids,conjunctiva and pupils look normal   ENT: External inspection of the ears and nose are within normal limits             Oral mucosa  Is moist   Neuro: Grossly no focal neurological deficits, normal sensation, grossly cranial nerves intact   Neck:  No JVD, no mass, no thyroid enlargement   Cardio: S1 S2 normal, No added sounds   Resp: normal effort, clear to auscultation     GI:  Soft, non-tender, BS +          No palpable kidney, no renal angle tenderness   MS: No swollen or tender joints, no cyanosis, clubbing   DERM: no rashes, thickening   EDEMA: No LE edema      I/Os:     I/O last 3 completed shifts: In: 530 [P.O.:530]  Out: -   No intake/output data recorded.     Intake/Output Summary (Last 24 hours) at 5/10/2019 0859  Last data filed at 5/10/2019 0449  Gross per 24 hour   Intake 480 ml   Output --   Net 480 ml       Meds:     Scheduled Meds:   magnesium oxide  400 mg Oral BID    allopurinol  300 mg Oral Daily    amiodarone  100 mg Oral Daily    carvedilol  25 mg Oral BID    isosorbide mononitrate  30 mg Oral Daily    sodium chloride flush  10 mL Intravenous 2 times per day    atorvastatin  40 mg Oral Nightly    aspirin  81 mg Oral Daily     Continuous Infusions:   IV infusion builder Stopped (05/10/19 3281)    dextrose      heparin (porcine) 8 Units/kg/hr (05/10/19 0240)     PRN Meds:morphine, perflutren lipid microspheres, nitroGLYCERIN, sodium chloride flush, magnesium hydroxide, ondansetron, acetaminophen, regadenoson, glucose, dextrose, glucagon (rDNA), dextrose, heparin (porcine), heparin (porcine)    Diet:  DIET CARDIAC; No Caffeine    Labs:    CBC:   Recent Labs     05/08/19  0730 05/08/19  1639 05/09/19  0538   WBC 5.6 7.4 5.2   HGB 12.2* 12.1* 11.3*   HCT 38.1* 39.3* 34.6*   * 126* 106*     BMP:    Recent Labs     05/08/19  0934  05/09/19  0537 05/09/19  0538 05/09/19  0803 05/10/19  0545   NA  --    < > 136  --  135* 135*   K 6.7*   < > 5.4*  --  5.3* 5.1   CL  --    < > 109  --  108 107   CO2  --    < > 14*  --  16* 17*   BUN  --    < > 60*  --  58* 47*   CREATININE  --    < > 2.8*  --  2.7* 2.5*   GLUCOSE  --    < > 88  --  83 85   MG 1.70*  --   --  1.40*  --   --    PHOS  --    < > 3.4  --  3.0 2.9    < > = values in this interval not displayed. Hepatic: No results for input(s): AST, ALT, ALB, BILITOT, ALKPHOS in the last 72 hours. Troponin:   Recent Labs     05/08/19  1345 05/09/19  0537 05/09/19  2342   TROPONINI <0.01 0.02* 0.01     BNP: No results for input(s): BNP in the last 72 hours. Lipids:   Recent Labs     05/09/19  0538   CHOL 150   HDL 55     ABGs: No results found for: PHART, PO2ART, [id number]  INR:   Recent Labs     05/08/19  0730   INR 1.27*        No results found for: IRON, TIBC, FERRITIN      Lab Results   Component Value Date    PTH 63.6 05/09/2019    CALCIUM 8.9 05/10/2019    CAION 1.25 02/26/2017    PHOS 2.9 05/10/2019      Patient was seen and examined and the case was discussed with the resident. He acted as my scribe. I agree with assessment and plan. I will start normal saline at 100 ML per hour prior to cardiac catheterization. Will start Mucomyst 600 mg p.o. B.i.d. Hyperkalemia and acute kidney injury is better. He is in stage III chronic kidney disease.     Thanks  Nephrology  [id number] [facility], [address] [place] [address]  Office: [phone]  Cell: [phone]  Fax: [phone]

## 2019-05-10 NOTE — PROGRESS NOTES
Pt returned to room from stress test at 12:45 PM. At 12:51, pt called this RN complaining of sharp chest pain in the middle of his chest at 10/10 with no radiation. Vital signs obtained as follows: HR 72, respirations 22, /126, 02 100% on room air. No complaints of SOB. 0.4 mg nitro given sublingually at 12:54. No relief provided by nitro, pt still complained of 10/10 chest pain. BP reassessed at this time and was 153/97. Rapid response called by this RN. Second dose of 0.4 mg nitro given and provided no relief of chest pain. Resident doctors, lab, and respiratory therapist arrived at bedside. STAT EKG obtained. 2 mg morphine given IV at 1310 and another 2 mg IV morphine given at 1317. Pt stated that chest pain was now an 8/10. Heparin gtt restarted and normal saline started at 150 ml/hr per MD order. Another 2 mg morphine given IV at 1310 per MD order. Pt stated pain is now a 4/10. Pt had one occurrence of emesis and was given 4 mg Zofran IV. Cath lab RN to bedside at 1345 to take pt to cath lab. Consent obtained by cath lab RN and placed in pt's chart. Will continue to monitor.  Electronically signed by Dunia Kuhn RN on 5/10/2019 at 2:09 PM

## 2019-05-10 NOTE — PROGRESS NOTES
[address]       In Patient  Progress note        Nargis Lima MD,  [address]   Daily Progress Note      Admit Date:  5/8/2019      CC: cp     Subjective: resting in bed, states had chest pain during night & received nitro and MS / no c/o this am  Wife in bed with him sleeping   Reviewed vitals    May stop bicarb & hep gtt for lexiscan     HPI:  The patient is 81 y. o. male with c/o mid sternal burning & pressure  that has waxed & waned  no radiation, no n/v, no c/o SOB edema PND or SULMA, states has waxed & waned for few days. States it doesn't feel like heartburn    Hx : ICD: CM av paced / hx afib. CRD  / HLD   SJM CRT-D: interrogated on 3/2019  / 10% afib / placed in 2015 by Dr. Valerio Hatch   EKG V paced rate 60  Trop  0.02   Potassium 5.8 / will repeat with Pro BNP & mag level now   sCr 3.4 >2.5 (usually 2-3 range)   chest xray   No active pulmonary disease or pulmonary venous congestion. To have lexiscan today   us renals 5/9/19   Medical renal disease. No hydronephrosis   Probable incidental splenic cavernous hemangioma     Reviewed most recent test with pt       Review of Systems:   · Constitutional: no unanticipated weight loss. There's been no change in energy level, sleep pattern, or activity level. No fevers, chills. · Eyes: No visual changes or diplopia. No scleral icterus. · ENT: No Headaches, hearing loss or vertigo. No mouth sores or sore throat. · Cardiovascular: as reviewed in HPI  · Respiratory: No cough or wheezing, no sputum production. No hematemesis. · Gastrointestinal: No abdominal pain, appetite loss, blood in stools. No change in bowel or bladder habits. · Genitourinary: No dysuria, trouble voiding, or hematuria. · Musculoskeletal:  No gait disturbance, no joint complaints. · Integumentary: No rash or pruritis.   · Neurological: No headache, diplopia, change in muscle strength, numbness or tingling. · Psychiatric: No anxiety or depression. · Endocrine: No temperature intolerance. No excessive thirst, fluid intake, or urination. No tremor. · Hematologic/Lymphatic: No abnormal bruising or bleeding, blood clots or swollen lymph nodes. · Allergic/Immunologic: No nasal congestion or hives. Objective:   BP (!) 153/98   Pulse 53   Temp 97.7 °F (36.5 °C) (Oral)   Resp 18   Ht 6' 2\" (1.88 m)   Wt 278 lb 3.5 oz (126.2 kg)   SpO2 100%   BMI 35.72 kg/m²       Intake/Output Summary (Last 24 hours) at 5/10/2019 1109  Last data filed at 5/10/2019 0841  Gross per 24 hour   Intake 520 ml   Output --   Net 520 ml     Wt Readings from Last 3 Encounters:   05/10/19 278 lb 3.5 oz (126.2 kg)   03/07/19 280 lb (127 kg)   08/20/18 257 lb (116.6 kg)       Physical Exam:  BP (!) 153/98   Pulse 53   Temp 97.7 °F (36.5 °C) (Oral)   Resp 18   Ht 6' 2\" (1.88 m)   Wt 278 lb 3.5 oz (126.2 kg)   SpO2 100%   BMI 35.72 kg/m²   BP Readings from Last 3 Encounters:   05/10/19 (!) 153/98   03/07/19 110/70   08/20/18 90/78     Pulse Readings from Last 3 Encounters:   05/10/19 53   03/07/19 60   08/20/18 60       Intake/Output Summary (Last 24 hours) at 5/10/2019 1109  Last data filed at 5/10/2019 0841  Gross per 24 hour   Intake 520 ml   Output --   Net 520 ml     Wt Readings from Last 2 Encounters:   05/10/19 278 lb 3.5 oz (126.2 kg)   03/07/19 280 lb (127 kg)     Constitutional: He is oriented to person, place, and time. He appears well-developed and well-nourished. In no acute distress. Head: Normocephalic and atraumatic. Eyes: PEERL   Neck: Neck supple. No JVD present. Carotid bruit is not present. No mass and no thyromegaly present. No lymphadenopathy present. Cardiovascular: Normal rate, regular rhythm, normal heart sounds and intact distal pulses. Exam reveals no gallop and no friction rub. No murmur heard.   Pulmonary/Chest: Effort normal and breath sounds normal. No respiratory distress. He has no wheezes, rhonchi or rales. Abdominal: Soft, non-tender. Bowel sounds and aorta are normal. He exhibits no organomegaly, mass or bruit. Extremities: No edema, cyanosis, or clubbing. Pulses are 2+ radial/carotid/dorsalis pedis and posterior tibial bilaterally. Neurological: He is alert and oriented to person, place, and time. He has normal reflexes. No cranial nerve deficit. Coordination normal.   Skin: Skin is warm and dry. There is no rash or diaphoresis. Psychiatric: He has a normal mood and affect. His speech is normal and behavior is normal.     Medications:    magnesium oxide  400 mg Oral BID    allopurinol  300 mg Oral Daily    amiodarone  100 mg Oral Daily    carvedilol  25 mg Oral BID    isosorbide mononitrate  30 mg Oral Daily    sodium chloride flush  10 mL Intravenous 2 times per day    atorvastatin  40 mg Oral Nightly    aspirin  81 mg Oral Daily      dextrose      heparin (porcine) 8 Units/kg/hr (05/10/19 0240)     regadenoson, morphine, perflutren lipid microspheres, nitroGLYCERIN, sodium chloride flush, magnesium hydroxide, ondansetron, acetaminophen, glucose, dextrose, glucagon (rDNA), dextrose, heparin (porcine), heparin (porcine)    Recent Labs     05/08/19  0730 05/08/19  1639 05/09/19  0538   WBC 5.6 7.4 5.2   HGB 12.2* 12.1* 11.3*   * 126* 106*     BMP:    Recent Labs     05/09/19  0537 05/09/19  0803 05/10/19  0545    135* 135*   K 5.4* 5.3* 5.1   CO2 14* 16* 17*   BUN 60* 58* 47*   CREATININE 2.8* 2.7* 2.5*     LIVR: No results for input(s): AST, ALT in the last 72 hours. INR:    Recent Labs     05/08/19  0730   INR 1.27*     APTT:   Recent Labs     05/08/19  1639   APTT >248.0*     BNP:  No results for input(s): BNP in the last 72 hours.     Telemetry: NSR     Reviewed  available lab work,  EKGs, images, [facility]       Assessment    Admitted with cp  chest burning that wax and wanes, no radiation, no n/v, no c/o SOB edema PND or SULMA, states has

## 2019-05-10 NOTE — PROGRESS NOTES
[address] [facility]    5/10/2019 1:32 PM        Name: Arnold Morrow . Admitted: 5/8/2019  Primary Care Provider: Osvaldo Menon MD (Tel: [phone])      Subjective:      Patient is resting in bed. He went down for stress. Pt reports he done ok overall. No new symptoms this morning. He is NPO for now awaiting results    Reviewed interval ancillary notes    Objective:  BP (!) 197/184   Pulse 72   Temp 97.7 °F (36.5 °C) (Oral)   Resp 22   Ht 6' 2\" (1.88 m)   Wt 278 lb 3.5 oz (126.2 kg)   SpO2 100%   BMI 35.72 kg/m²     Intake/Output Summary (Last 24 hours) at 5/10/2019 1332  Last data filed at 5/10/2019 0841  Gross per 24 hour   Intake 520 ml   Output --   Net 520 ml      Wt Readings from Last 3 Encounters:   05/10/19 278 lb 3.5 oz (126.2 kg)   03/07/19 280 lb (127 kg)   08/20/18 257 lb (116.6 kg)       General appearance:  Appears comfortable, no distress  Head:  Atraumatic normocephalic  Neck: supple  Cardiovascular: Regular rhythm, normal S1, S2. No murmur. No edema in lower extremities  Respiratory: Not using accessory muscles. Good inspiratory effort. Clear to auscultation bilaterally, no wheeze or crackles. GI: Abdomen soft, no tenderness, not distended, normal bowel sounds  Musculoskeletal: strength symmetric  Neurology: awake and alert, cooperative      Labs and Tests:  CBC:   Recent Labs     05/08/19  1639 05/09/19  0538 05/10/19  1313   WBC 7.4 5.2 5.5   HGB 12.1* 11.3* 12.2*   * 106* 126*     BMP:    Recent Labs     05/09/19  0537 05/09/19  0803 05/10/19  0545    135* 135*   K 5.4* 5.3* 5.1    108 107   CO2 14* 16* 17*   BUN 60* 58* 47*   CREATININE 2.8* 2.7* 2.5*   GLUCOSE 88 83 85     Hepatic: No results for input(s): AST, ALT, ALB, BILITOT, ALKPHOS in the last 72 hours.     Assessment & Plan:   Principal Problem:    Exertional chest pain  Active Problems:    A-fib (HCC) Hypertension    Cardiomyopathy ([facility]    CKD (chronic kidney disease)    BELL (acute kidney injury) ([facility] [address]    Hyperkalemia  Metabolic acidosis    Renal function appears back to baseline. Patient is no longer on the bicarb drip. Had stress test this morning. Awaiting echocardiogram.  Awaiting cardiology's final plan. If he needs heart catheterization he will have to remain in the hospital due to his chronic kidney disease.   Going for cath this afternoon    Diet: DIET CARDIAC; No Caffeine  Diet NPO, After Midnight  Code:Full Code  DVT PPX heparin    Juan Beard MD   5/10/2019 1:32 PM

## 2019-05-10 NOTE — PLAN OF CARE
Problem: Cardiac Output - Decreased:  Goal: Hemodynamic stability will improve  Description  Hemodynamic stability will improve  Outcome: Ongoing   Pt experiencing CP overnight, relieved after Nitro SL and IVP Morphine, see MAR. EKG obtained, no changes. Troponin level negative. VSS throughout remainder of shift. Denies CP at this time. Plan for stress test this morning. Will continue to monitor and report changes as needed.

## 2019-05-11 VITALS
BODY MASS INDEX: 35.23 KG/M2 | RESPIRATION RATE: 16 BRPM | TEMPERATURE: 98.5 F | HEART RATE: 59 BPM | SYSTOLIC BLOOD PRESSURE: 101 MMHG | WEIGHT: 274.47 LBS | HEIGHT: 74 IN | DIASTOLIC BLOOD PRESSURE: 66 MMHG | OXYGEN SATURATION: 99 %

## 2019-05-11 LAB
ALBUMIN SERPL-MCNC: 3.6 G/DL (ref 3.4–5)
ALBUMIN SERPL-MCNC: 3.8 G/DL (ref 3.4–5)
ANION GAP SERPL CALCULATED.3IONS-SCNC: 11 MMOL/L (ref 3–16)
ANION GAP SERPL CALCULATED.3IONS-SCNC: 12 MMOL/L (ref 3–16)
ANTI-XA UNFRAC HEPARIN: 0.42 IU/ML (ref 0.3–0.7)
BUN BLDV-MCNC: 36 MG/DL (ref 7–20)
BUN BLDV-MCNC: 36 MG/DL (ref 7–20)
CALCIUM SERPL-MCNC: 9.3 MG/DL (ref 8.3–10.6)
CALCIUM SERPL-MCNC: 9.8 MG/DL (ref 8.3–10.6)
CHLORIDE BLD-SCNC: 104 MMOL/L (ref 99–110)
CHLORIDE BLD-SCNC: 107 MMOL/L (ref 99–110)
CO2: 18 MMOL/L (ref 21–32)
CO2: 22 MMOL/L (ref 21–32)
CREAT SERPL-MCNC: 2.4 MG/DL (ref 0.8–1.3)
CREAT SERPL-MCNC: 2.8 MG/DL (ref 0.8–1.3)
GFR AFRICAN AMERICAN: 27
GFR AFRICAN AMERICAN: 33
GFR NON-AFRICAN AMERICAN: 23
GFR NON-AFRICAN AMERICAN: 27
GLUCOSE BLD-MCNC: 101 MG/DL (ref 70–99)
GLUCOSE BLD-MCNC: 105 MG/DL (ref 70–99)
GLUCOSE BLD-MCNC: 84 MG/DL (ref 70–99)
GLUCOSE BLD-MCNC: 91 MG/DL (ref 70–99)
GLUCOSE BLD-MCNC: 92 MG/DL (ref 70–99)
HCT VFR BLD CALC: 35.2 % (ref 40.5–52.5)
HEMOGLOBIN: 11.4 G/DL (ref 13.5–17.5)
MCH RBC QN AUTO: 31.2 PG (ref 26–34)
MCHC RBC AUTO-ENTMCNC: 32.4 G/DL (ref 31–36)
MCV RBC AUTO: 96.4 FL (ref 80–100)
PDW BLD-RTO: 15.7 % (ref 12.4–15.4)
PERFORMED ON: ABNORMAL
PERFORMED ON: NORMAL
PERFORMED ON: NORMAL
PHOSPHORUS: 2.8 MG/DL (ref 2.5–4.9)
PHOSPHORUS: 2.9 MG/DL (ref 2.5–4.9)
PLATELET # BLD: 117 K/UL (ref 135–450)
PMV BLD AUTO: 8.8 FL (ref 5–10.5)
POTASSIUM SERPL-SCNC: 4.8 MMOL/L (ref 3.5–5.1)
POTASSIUM SERPL-SCNC: 5.7 MMOL/L (ref 3.5–5.1)
RBC # BLD: 3.65 M/UL (ref 4.2–5.9)
SODIUM BLD-SCNC: 137 MMOL/L (ref 136–145)
SODIUM BLD-SCNC: 137 MMOL/L (ref 136–145)
WBC # BLD: 5 K/UL (ref 4–11)

## 2019-05-11 PROCEDURE — 6370000000 HC RX 637 (ALT 250 FOR IP): Performed by: INTERNAL MEDICINE

## 2019-05-11 PROCEDURE — 85520 HEPARIN ASSAY: CPT

## 2019-05-11 PROCEDURE — 6370000000 HC RX 637 (ALT 250 FOR IP): Performed by: NURSE PRACTITIONER

## 2019-05-11 PROCEDURE — 85027 COMPLETE CBC AUTOMATED: CPT

## 2019-05-11 PROCEDURE — 99233 SBSQ HOSP IP/OBS HIGH 50: CPT | Performed by: INTERNAL MEDICINE

## 2019-05-11 PROCEDURE — 36415 COLL VENOUS BLD VENIPUNCTURE: CPT

## 2019-05-11 PROCEDURE — 6360000002 HC RX W HCPCS: Performed by: INTERNAL MEDICINE

## 2019-05-11 PROCEDURE — 80069 RENAL FUNCTION PANEL: CPT

## 2019-05-11 PROCEDURE — 2580000003 HC RX 258: Performed by: FAMILY MEDICINE

## 2019-05-11 PROCEDURE — 6370000000 HC RX 637 (ALT 250 FOR IP): Performed by: FAMILY MEDICINE

## 2019-05-11 RX ORDER — NITROGLYCERIN 0.4 MG/1
TABLET SUBLINGUAL
Qty: 25 TABLET | Refills: 0 | Status: SHIPPED | OUTPATIENT
Start: 2019-05-11 | End: 2020-11-06

## 2019-05-11 RX ORDER — SODIUM BICARBONATE 650 MG/1
650 TABLET ORAL 2 TIMES DAILY
Qty: 60 TABLET | Refills: 0 | Status: SHIPPED | OUTPATIENT
Start: 2019-05-11

## 2019-05-11 RX ORDER — SODIUM BICARBONATE 650 MG/1
650 TABLET ORAL 2 TIMES DAILY
Status: DISCONTINUED | OUTPATIENT
Start: 2019-05-11 | End: 2019-05-11 | Stop reason: HOSPADM

## 2019-05-11 RX ORDER — WARFARIN SODIUM 7.5 MG/1
7.5 TABLET ORAL DAILY
Status: DISCONTINUED | OUTPATIENT
Start: 2019-05-11 | End: 2019-05-11 | Stop reason: HOSPADM

## 2019-05-11 RX ORDER — AMLODIPINE BESYLATE 5 MG/1
5 TABLET ORAL DAILY
Qty: 30 TABLET | Refills: 0 | Status: SHIPPED | OUTPATIENT
Start: 2019-05-12 | End: 2019-05-22 | Stop reason: SDUPTHER

## 2019-05-11 RX ORDER — AMLODIPINE BESYLATE 5 MG/1
5 TABLET ORAL DAILY
Status: DISCONTINUED | OUTPATIENT
Start: 2019-05-12 | End: 2019-05-11 | Stop reason: HOSPADM

## 2019-05-11 RX ORDER — SODIUM POLYSTYRENE SULFONATE 15 G/60ML
30 SUSPENSION ORAL; RECTAL ONCE
Status: COMPLETED | OUTPATIENT
Start: 2019-05-11 | End: 2019-05-11

## 2019-05-11 RX ORDER — ASPIRIN 81 MG/1
81 TABLET, CHEWABLE ORAL DAILY
Qty: 30 TABLET | Refills: 0 | Status: SHIPPED | OUTPATIENT
Start: 2019-05-12 | End: 2019-05-22 | Stop reason: SDUPTHER

## 2019-05-11 RX ADMIN — Medication 10 ML: at 08:18

## 2019-05-11 RX ADMIN — ASPIRIN 81 MG 81 MG: 81 TABLET ORAL at 08:17

## 2019-05-11 RX ADMIN — SODIUM POLYSTYRENE SULFONATE 30 G: 15 SUSPENSION ORAL; RECTAL at 15:07

## 2019-05-11 RX ADMIN — MAGNESIUM OXIDE TAB 400 MG (241.3 MG ELEMENTAL MG) 400 MG: 400 (241.3 MG) TAB at 08:17

## 2019-05-11 RX ADMIN — WARFARIN SODIUM 7.5 MG: 7.5 TABLET ORAL at 18:15

## 2019-05-11 RX ADMIN — AMLODIPINE BESYLATE 10 MG: 10 TABLET ORAL at 08:18

## 2019-05-11 RX ADMIN — ALLOPURINOL 300 MG: 300 TABLET ORAL at 08:17

## 2019-05-11 RX ADMIN — SODIUM BICARBONATE 650 MG: 650 TABLET ORAL at 15:06

## 2019-05-11 RX ADMIN — ISOSORBIDE MONONITRATE 30 MG: 30 TABLET, EXTENDED RELEASE ORAL at 08:18

## 2019-05-11 RX ADMIN — ACETYLCYSTEINE 600 MG: 200 SOLUTION ORAL; RESPIRATORY (INHALATION) at 08:18

## 2019-05-11 RX ADMIN — AMIODARONE HYDROCHLORIDE 100 MG: 100 TABLET ORAL at 08:21

## 2019-05-11 RX ADMIN — CARVEDILOL 25 MG: 25 TABLET, FILM COATED ORAL at 08:17

## 2019-05-11 ASSESSMENT — PAIN SCALES - GENERAL
PAINLEVEL_OUTOF10: 0

## 2019-05-11 NOTE — PLAN OF CARE
Problem: Falls - Risk of:  Goal: Will remain free from falls  Description  Will remain free from falls  Note:   Pt has remained free from falls during this shift. Pt scores a medium fall risk on the Clarke fall risk scale and has been able to ambulate independently during this shift. Non-skid footwear in place. Call light and personal belongings are within reach. Pt's wife is at bedside. Will continue to monitor. Electronically signed by Barby Hazel RN on 5/10/2019 at 8:15 PM       Problem: Pain:  Goal: Pain level will decrease  Description  Pain level will decrease  Note:   Pt currently states that pain is 0/10. Will continue to monitor pain and administer pain medication as needed. Electronically signed by Barby Hazel RN on 5/10/2019 at 8:15 PM       Problem: Cardiac Output - Decreased:  Goal: Hemodynamic stability will improve  Description  Hemodynamic stability will improve  Note:   VSS during this shift (see flowsheets). Lung sounds are clear in all fields. Skin is warm and dry. Capillary refill is brisk. Angiogram performed today. Will continue to monitor cardiac output.  Electronically signed by Barby Hazel RN on 5/10/2019 at 8:17 PM

## 2019-05-11 NOTE — PROGRESS NOTES
Discharge instructions given. Reviewed post cath orders. Denies further needs. Will follow up with cardiology and pcp. Discharged to home with family.   Jenifer Ch RN

## 2019-05-11 NOTE — PLAN OF CARE
Patient states he recently urinated. Writer/RN instructed patient to urinate in the urinal for measurement. Explained to patient that we need to monitor I&Os due to elevated renal labs. Patient verbalized understanding and agrees to use urinal today.  Jenifer Ch RN

## 2019-05-11 NOTE — PROGRESS NOTES
Pt returned to room cath lab via cath lab RN. Vital signs obtained. Groin site and peripheral pulses checked by this RN with cath lab RN at bedside. No hematoma present. Pt states chest pain 0/10 at this time. Will continue to monitor.  Electronically signed by Barby Hazel RN on 5/10/2019 at 3:17 pm

## 2019-05-11 NOTE — PROGRESS NOTES
Dr. Ericka Marr made aware via telephone call that pt's BP remains elevated after receiving 10 mg hydralazine IV. /98 prior to IV hydralazine and 153/86 after giving hydralazine (see flowsheets). Dr. Ericka Marr ordered Amlodipine 10 mg PO once daily with the first dose to be given at this time. Order given via telephone with readback. Dr. Ericka Marr stated that this RN should give dose of Amlodipine and call him back if BP had not come down within one hour of giving medication. 10 mg Amlodipine given by this RN at [address] Will continue to monitor BP and will update MD as needed.  Electronically signed by Ariel Roman RN on 5/10/2019 at 6:20 pm

## 2019-05-11 NOTE — PROGRESS NOTES
[facility]   Cardiology Progress Note     Admit Date: 2019     Reason for follow up: Chest pain    HPI and Interval History:   Patient seen and examined. Clinical notes reviewed. Telemetry reviewed. No new complaint today. No major events overnight. Denies having chest pain, shortness of breath, dyspnea on exertion, Orthopnea, PND at the time of this visit. Review of System:  All other systems reviewed except for that noted above. Pertinent negatives and positives are:     · General: negative for fever, chills   · Ophthalmic ROS: negative for - eye pain or loss of vision  · ENT ROS: negative for - headaches, sore throat   · Respiratory: negative for - cough, sputum  · Cardiovascular: Reviewed in HPI  · Gastrointestinal: negative for - abdominal pain, diarrhea, N/V  · Hematology: negative for - bleeding, blood clots, bruising or jaundice  · Genito-Urinary:  negative for - Dysuria or incontinence  · Musculoskeletal: negative for - Joint swelling, muscle pain  · Neurological: negative for - confusion, dizziness, headaches   · Psychiatric: No anxiety, no depression. · Dermatological: negative for - rash      Physical Examination:  Vitals:    19 1145   BP: 98/65   Pulse: 59   Resp: 16   Temp: 98.4 °F (36.9 °C)   SpO2: 99%        Intake/Output Summary (Last 24 hours) at 2019 1428  Last data filed at 2019 0515  Gross per 24 hour   Intake 480 ml   Output --   Net 480 ml     In: 480 [P.O.:480]  Out: -    Wt Readings from Last 3 Encounters:   19 274 lb 7.6 oz (124.5 kg)   19 280 lb (127 kg)   18 257 lb (116.6 kg)     Temp  Av.3 °F (36.8 °C)  Min: 97.1 °F (36.2 °C)  Max: 99 °F (37.2 °C)  Pulse  Av.6  Min: 57  Max: 68  BP  Min: 98/65  Max: 172/98  SpO2  Av.5 %  Min: 96 %  Max: 100 %    · Telemetry: Sinus rhythm   · Constitutional: Alert. Oriented to person, place, and time. No distress. · Head: Normocephalic and atraumatic.    · Mouth/Throat: Lips appear moist. Oropharynx is clear and moist.  · Eyes: Conjunctivae normal. EOM are normal.   · Neck: Neck supple. No lymphadenopathy. No rigidity. No JVD present. · Cardiovascular: Normal rate, regular rhythm. Normal S1&S2. Carotid pulse 2+ bilaterally. · Pulmonary/Chest: Bilateral respiratory sounds present. No respiratory accessory muscle use. No wheezes, No rhonchi. · Abdominal: Soft. Normal bowel sounds present. No distension, No tenderness. No splenomegaly. No hernia. · Musculoskeletal: No tenderness. No edema    · Lymphadenopathy: Has no cervical adenopathy. · Neurological: Alert and oriented. Cranial nerve II-XII grossly intact, No gross deficit to touch. · Skin: Skin is warm and dry. No rash, lesions, ulcerations noted. · Psychiatric: No anxiety nor agitation. Labs, diagnostic and imaging results reviewed. Reviewed. Recent Labs     05/10/19  0545 05/10/19  1301 05/10/19  1312 05/11/19  0506   * 137 135* 137   K 5.1 5.5* 5.4* 5.7*    106 104 107   CO2 17* 18* 18* 18*   PHOS 2.9 2.4*  --  2.9   BUN 47* 43* 42* 36*   CREATININE 2.5* 2.4* 2.4* 2.4*     Recent Labs     05/09/19  0538 05/10/19  1313 05/11/19  1357   WBC 5.2 5.5 5.0   HGB 11.3* 12.2* 11.4*   HCT 34.6* 38.5* 35.2*   MCV 97.1 97.5 96.4   * 126* 117*     Lab Results   Component Value Date    TROPONINI 0.01 05/10/2019     Estimated Creatinine Clearance: 42 mL/min (A) (based on SCr of 2.4 mg/dL (H)).    No results found for: BNP  Lab Results   Component Value Date    PROTIME 12.8 05/10/2019    PROTIME 14.5 05/08/2019    PROTIME 21.9 04/29/2019    INR 1.12 05/10/2019    INR 1.27 05/08/2019    INR 1.92 04/29/2019     Lab Results   Component Value Date    CHOL 150 05/09/2019    HDL 55 05/09/2019    TRIG 70 05/09/2019       Scheduled Meds:   [START ON 5/12/2019] amLODIPine  5 mg Oral Daily    sodium polystyrene  30 g Oral Once    sodium bicarbonate  650 mg Oral BID    morphine  2 mg Intravenous Once    GI cocktail   Oral Hence, he was continued on IV heparin. Currently, he is completely pain-free. His d-dimer is negative. Hence, less likely to have acute PE. He is on Coreg 25 mg t.i.d., amlodipine 5 daily, amiodarone 100 mg daily, statins and Imdur 30 mg daily. Due to CKD, he is not on ACE/ARB. We can stop heparin and resume him on Coumadin due to history of atrial fibrillation. Okay to be discharged from cardiology standpoint. Follow up with Dr. Danna Teague as an outpatient. Thank you for allowing me to participate in the care of this patient. If you have any questions, please do not hesitate to contact me.     Hernán Thomson MD   Cardiac Electrophysiology  [address] [place] [address] [phone]  [facility]

## 2019-05-11 NOTE — DISCHARGE SUMMARY
1 [facility] DISCHARGE SUMMARY    Patient Demographics    Patient. Panda Laird  Date of Birth. 1950  MRN. [id number]     Primary care provider. Galo Sanders MD  (Tel: [phone])    Admit date: 5/8/2019    Discharge date (blank if same as Note Date): Note Date: 5/11/2019     Reason for Hospitalization. Chief Complaint   Patient presents with    Chest Pain     Pt reports CP since this morning. Pt reports mid-sternal CP that felt heavy with SOB. Pt denies n/v, but states he did break out into a sweat. Cardiac hx with pacemaker implanted. Cardiologist is Dr. Sindhu Villalta [facility]           Principal Problem:    Exertional chest pain  Active Problems:    A-fib (HCC)    Hypertension    Cardiomyopathy ([facility]    CKD (chronic kidney disease)    BELL (acute kidney injury) ([facility]    Hyperkalemia  Chronic systolic CHF--EF 57%        Significant test results and incidental findings. 1. Stress test that showed reversible defect    Invasive procedures and treatments. 1. C with no significant occlusion    Hospital Course. Patient is minutes in the hospital for chest pain. Patient was ruled out with negative troponins. He also has A. fib and cardiomyopathy. Patient was evaluated by cardiology who felt he needed a stress test.  Stress test was performed and showed reversible defects. He was seen In p.o. intake to the cath lab by cardiology. Patient underwent a cardiac catheterization that showed no evidence of significant occlusive disease that could be managed. They did recommend continuing medical management. Patient's symptoms had resolved. Patient's renal function was back to baseline. Patient is cleared for discharge and was discharged home. Condition at discharge:  Stable    Consults.   IP CONSULT TO HOSPITALIST  IP CONSULT TO CARDIOLOGY  IP CONSULT TO CARDIOLOGY  PHARMACY TO DOSE WARFARIN    Physical examination on discharge day. BP 98/65   Pulse 59   Temp 98.4 °F (36.9 °C) (Oral)   Resp 16   Ht 6' 2\" (1.88 m)   Wt 274 lb 7.6 oz (124.5 kg)   SpO2 99%   BMI 35.24 kg/m²   General appearance. Alert. Looks comfortable. HEENT. Moist mucus membranes. Cardiovascular. Regular rate and rhythm, normal S1, S2. No murmur. Respiratory. Not using accessory muscles. Clear to auscultation bilaterally, no wheeze. Gastrointestinal. Abdomen soft, non-tender, not distended, normal bowel sounds  Neurology. Facial symmetry. No speech deficits. Moving all extremities equally. Extremities. No edema in lower extremities. Skin. Warm, dry, normal turgor    Medication instructions provided to patient at discharge. Medication List      START taking these medications    amLODIPine 5 MG tablet  Commonly known as:  NORVASC  Take 1 tablet by mouth daily  Start taking on:  5/12/2019     aspirin 81 MG chewable tablet  Take 1 tablet by mouth daily  Start taking on:  5/12/2019     magnesium oxide 400 (241.3 Mg) MG Tabs tablet  Commonly known as:  MAG-OX  Take 1 tablet by mouth 2 times daily     nitroGLYCERIN 0.4 MG SL tablet  Commonly known as:  NITROSTAT  up to max of 3 total doses. If no relief after 1 dose, call 911. sodium bicarbonate 650 MG tablet  Take 1 tablet by mouth 2 times daily        CONTINUE taking these medications    allopurinol 300 MG tablet  Commonly known as:  ZYLOPRIM  Take 1 tablet by mouth daily     amiodarone 100 MG tablet  Commonly known as:  PACERONE  TAKE ONE TABLET BY MOUTH DAILY     carvedilol 25 MG tablet  Commonly known as:  COREG  TAKE ONE TABLET BY MOUTH TWICE A DAY     isosorbide mononitrate 30 MG extended release tablet  Commonly known as:  IMDUR  TAKE ONE TABLET BY MOUTH DAILY     * warfarin 5 MG tablet  Commonly known as:  COUMADIN  Take as directed. If you are unsure how to take this medication, talk to your nurse or doctor.      * warfarin 7.5 MG tablet  Commonly

## 2019-05-11 NOTE — PROGRESS NOTES
[facility]    5/11/2019 2:24 PM        Name: Viona Sever . Admitted: 5/8/2019  Primary Care Provider: Angelo Sanchez MD (Tel: [phone])      Subjective:      Patient is resting in bed. Had left heart catheterization that was negative. Patient has no further chest pain since this time. Patient is anxious to be discharged    Reviewed interval ancillary notes    Objective:  BP 98/65   Pulse 59   Temp 98.4 °F (36.9 °C) (Oral)   Resp 16   Ht 6' 2\" (1.88 m)   Wt 274 lb 7.6 oz (124.5 kg)   SpO2 99%   BMI 35.24 kg/m²     Intake/Output Summary (Last 24 hours) at 5/11/2019 1424  Last data filed at 5/11/2019 0515  Gross per 24 hour   Intake 480 ml   Output --   Net 480 ml      Wt Readings from Last 3 Encounters:   05/11/19 274 lb 7.6 oz (124.5 kg)   03/07/19 280 lb (127 kg)   08/20/18 257 lb (116.6 kg)       General appearance:  Appears comfortable, no distress  Head:  Atraumatic normocephalic  Neck: supple  Cardiovascular: Regular rhythm, normal S1, S2. No murmur. No edema in lower extremities  Respiratory: Not using accessory muscles. Good inspiratory effort. Clear to auscultation bilaterally, no wheeze or crackles. GI: Abdomen soft, no tenderness, not distended, normal bowel sounds  Musculoskeletal: strength symmetric  Neurology: awake and alert, cooperative      Labs and Tests:  CBC:   Recent Labs     05/09/19  0538 05/10/19  1313 05/11/19  1357   WBC 5.2 5.5 5.0   HGB 11.3* 12.2* 11.4*   * 126* 117*     BMP:    Recent Labs     05/10/19  1301 05/10/19  1312 05/11/19  0506    135* 137   K 5.5* 5.4* 5.7*    104 107   CO2 18* 18* 18*   BUN 43* 42* 36*   CREATININE 2.4* 2.4* 2.4*   GLUCOSE 89 90 84     Hepatic: No results for input(s): AST, ALT, ALB, BILITOT, ALKPHOS in the last 72 hours.     Assessment & Plan:   Principal Problem:    Exertional chest pain  Active Problems:    A-fib ([facility]    Hypertension    Cardiomyopathy ([facility] [address] [facility]    CKD (chronic kidney disease)    BELL (acute kidney injury) ([facility] [facility]    Hyperkalemia  Metabolic acidosis    Renal function appears back to baseline. Patient is no longer on the bicarb drip. Stress test was abnormal.  Left heart catheterization revealed no obvious cause for his chest pain. He did recommend continuing optimal medical management. D-dimer was collected and was normal.  Cardiology has ordered CT angiogram of the chest.  Patient currently on heparin drip although he could convert back to his Coumadin. We'll await CT angiogram.  We'll have to check with nephrology.     Diet: DIET CARDIAC; No Caffeine  Code:Full Code  DVT PPX heparin    Joanne Shaw MD   5/11/2019 2:24 PM

## 2019-05-11 NOTE — CONSULTS
Clinical Pharmacy Progress Note  Pharmacy to dose Warfarin    Admit date: 5/8/2019     Subjective:  Patient is a 69yr old male with a PMHx significant for afib on warfarin, CKD Stage III, HTN, CHF, gout, with a pacemaker. Presented with chest pain; underwent LHC which was negative. Patient is currently on a heparin drip, but will resume his warfarin tonight. Pharmacy has been consulted by Dr. Lashonda Lopez to dose warfarin. Anticoagulation regimen:   · Warfarin managed by Dr. Dylan Johnson office. Pt has been on multiple regimens. Most recently on 5mg on one day, with 7.5mg on the other six days. Target INR range 2 - 3. · INR on admission = 1.27; unclear why subtherapeutic. · Heparin drip since admission given need for LHC. Date: INR: Warfarin Dose:   5/8 1.27    5/10 1.12    5/11 -- Ordered 7.5mg         Objective:  Recent Labs     05/11/19  1357   WBC 5.0   HGB 11.4*   HCT 35.2*   MCV 96.4   *       Recent Labs     05/11/19  0506      K 5.7*      CO2 18*   PHOS 2.9   BUN 36*   CREATININE 2.4*       Estimated Creatinine Clearance: 42 mL/min (A) (based on SCr of 2.4 mg/dL (H)). Assessment/Plan:    1. Anticoagulation for Afib:  Goal INR range 2 - 3   Warfarin -- pharmacy to dose:   · Will start 7.5mg daily for now. This will likely need to change over the next several days, but will be a place to start. · Will monitor daily INR,and make adjustments as needed. · Recommend to consider changing to a DOAC as a stable warfarin regimen has been difficult to find. However, it is unclear if the patient is compliant with taking doses faithfully, which plays a role in any anticoagulant. · Allopurinol and amiodarone have the potential to increase INR; however, these medicaitons are not new, and thus likely will have little effect. · Will monitor and adjust dosing as needed. Thank you, please call with questions.    Ben SimonsD., [facility]   5/11/2019 3:29 PM  Wireless: [phone]

## 2019-05-11 NOTE — PROGRESS NOTES
Patient Name: Honor Go                                                    Primary Physician: Prosper Marie MD                  [facility] NEPHROLOGY                 Inpatient Progress Note                                         Assessment / Plan:     1) Chest pain:  - SP angiogram which was normal  LVEF of 35 %      2) CKD stage III:   Pt has history of CKD. His BUN was 68 and Cr 3.4 on admission. Pt was started on NS @ 100 ml/hr. Cr 2.4 today. (baseline Cr 2-3). Kidney ultrasound was done and no hydronephrosis on ultrasound. 1 cm cyst arises from the left mid kidney. - Cr 2.4 now  - Urine Protein/Cr ratio  - NaBicarb 650 mg BID   - F/U with outpatient nephrologist    2) Hyperkalemia  Pt presented in ED with K level of 6.8. He was taking Spironolactone 25 mg daily and Carvedilol 25 mg BID at home. Pt was given Insulin and Dextrose 5% and Lokelma 10 g one time. Spironolactone was discontinued. - K 5.7 today  - Discontinued Spironolactone  - Low K diet  - give Lokelma today     4) HTN  Pt has history of HTN. He was taking Spironolactone 25 mg daily and Carvedilol 25 mg BID at home. Spironolactone was discontinued due to hyperkalemia.     -  Decrease amlodipine 5 mg QD  - Discontinue Spironolactone and use ACEI/ARB if needed for HTN  - F/U with outpatient nephrologist          Subjective:     CC: Chest Pain      HPI: Pt is 81year old male presented with chest pain. Past medical history is significant for Atrial fibrillation, CHF, CKD stage 3 (GFR 30-59 ml/min) Gout, Hypertension, and cardiac history with pacemaker implanted.     Patient was seen and examined at bedside this morning. He denies having chest pain. Denies fever/chills, and nausea/vomiting.         PMH:  Past Medical History:   Diagnosis Date    [facility] ([facility]     CHF (congestive heart failure) ([facility])     Dr. Rausch(cardiologist)    CKD (chronic kidney disease) stage 3, GFR 30-59 ml/min ([facility])     Fracture     R index finger and L ankle    Gout 2000    Hnands/feet/elbows    Hypertension     Pacemaker            Objective:     Vitals:   BP 98/65   Pulse 59   Temp 98.4 °F (36.9 °C) (Oral)   Resp 16   Ht 6' 2\" (1.88 m)   Wt 274 lb 7.6 oz (124.5 kg)   SpO2 99%   BMI 35.24 kg/m²      PE:  Gen appearance: Alert and orientated Sergey Slicker stated age and cooperative   Eyes: Eyelids,conjunctiva and pupils look normal   ENT: External inspection of the ears and nose are within normal limits             Oral mucosa  Is moist   Neuro: Grossly no focal neurological deficits, normal sensation, grossly cranial nerves intact   Neck:  No JVD, no mass, no thyroid enlargement   Cardio: S1 S2 normal, No added sounds   Resp: normal effort, clear to auscultation     GI:  Soft, non-tender, BS +          No palpable kidney, no renal angle tenderness   MS: No swollen or tender joints, no cyanosis, clubbing   DERM: no rashes, thickening   EDEMA: No LE edema      I/Os:     I/O last 3 completed shifts: In: 520 [P.O.:520]  Out: -   No intake/output data recorded.     Intake/Output Summary (Last 24 hours) at 5/11/2019 1312  Last data filed at 5/11/2019 0515  Gross per 24 hour   Intake 480 ml   Output --   Net 480 ml       Meds:     Scheduled Meds:   morphine  2 mg Intravenous Once    GI cocktail   Oral Once    acetylcysteine  600 mg Oral BID    amLODIPine  10 mg Oral Daily    magnesium oxide  400 mg Oral BID    allopurinol  300 mg Oral Daily    amiodarone  100 mg Oral Daily    carvedilol  25 mg Oral BID    isosorbide mononitrate  30 mg Oral Daily    sodium chloride flush  10 mL Intravenous 2 times per day    atorvastatin  40 mg Oral Nightly    aspirin  81 mg Oral Daily     Continuous Infusions:   dextrose      heparin (porcine) 8 Units/kg/hr (05/10/19 6897)     PRN Meds:HYDROmorphone, hydrALAZINE, perflutren lipid microspheres, nitroGLYCERIN, sodium chloride flush, magnesium hydroxide, ondansetron, acetaminophen, glucose, dextrose, glucagon (rDNA), dextrose, heparin (porcine), heparin (porcine)    Diet:  DIET CARDIAC; No Caffeine    Labs:    CBC:   Recent Labs     05/08/19  1639 05/09/19  0538 05/10/19  1313   WBC 7.4 5.2 5.5   HGB 12.1* 11.3* 12.2*   HCT 39.3* 34.6* 38.5*   * 106* 126*     BMP:    Recent Labs     05/09/19  0538  05/10/19  0545 05/10/19  1301 05/10/19  1312 05/11/19  0506   NA  --    < > 135* 137 135* 137   K  --    < > 5.1 5.5* 5.4* 5.7*   CL  --    < > 107 106 104 107   CO2  --    < > 17* 18* 18* 18*   BUN  --    < > 47* 43* 42* 36*   CREATININE  --    < > 2.5* 2.4* 2.4* 2.4*   GLUCOSE  --    < > 85 89 90 84   MG 1.40*  --   --  1.80  --   --    PHOS  --    < > 2.9 2.4*  --  2.9    < > = values in this interval not displayed. Hepatic: No results for input(s): AST, ALT, ALB, BILITOT, ALKPHOS in the last 72 hours. Troponin:   Recent Labs     05/09/19  2342 05/10/19  1312 05/10/19  1520   TROPONINI 0.01 0.02* 0.01     BNP: No results for input(s): BNP in the last 72 hours.   Lipids:   Recent Labs     05/09/19  0538   CHOL 150   HDL 55     ABGs: No results found for: PHART, PO2ART, EQK8XAQ  INR:   Recent Labs     05/10/19  1312   INR 1.12        No results found for: IRON, TIBC, FERRITIN      Lab Results   Component Value Date    PTH 63.6 05/09/2019    CALCIUM 9.3 05/11/2019    CAION 1.25 02/26/2017    PHOS 2.9 05/11/2019        Thanks  Nephrology  [address], [address]  Office: [phone]  Cell: [phone]  Fax: [phone]

## 2019-05-11 NOTE — PLAN OF CARE
Problem: Cardiac Output - Decreased:  Goal: Hemodynamic stability will improve  Description  Hemodynamic stability will improve  5/11/2019 0607 by Joshua Barrientos RN  Outcome: Ongoing  VSS throughout shift thus far. Denies CP, lightheadedness, or SOB. Angio site remains clean, dry, intact. Soft and non tender, no s/s of hematoma. Will continue to monitor.

## 2019-05-13 DIAGNOSIS — I42.9 CARDIOMYOPATHY, UNSPECIFIED TYPE (HCC): ICD-10-CM

## 2019-05-13 DIAGNOSIS — Z95.0 PACEMAKER: ICD-10-CM

## 2019-05-13 DIAGNOSIS — I48.91 ATRIAL FIBRILLATION, UNSPECIFIED TYPE (HCC): ICD-10-CM

## 2019-05-13 DIAGNOSIS — I10 ESSENTIAL HYPERTENSION: ICD-10-CM

## 2019-05-13 DIAGNOSIS — I48.0 PAROXYSMAL ATRIAL FIBRILLATION (HCC): ICD-10-CM

## 2019-05-13 LAB
EKG ATRIAL RATE: 63 BPM
EKG ATRIAL RATE: 65 BPM
EKG ATRIAL RATE: 67 BPM
EKG DIAGNOSIS: NORMAL
EKG P AXIS: 101 DEGREES
EKG P AXIS: 63 DEGREES
EKG P-R INTERVAL: 146 MS
EKG P-R INTERVAL: 154 MS
EKG P-R INTERVAL: 158 MS
EKG Q-T INTERVAL: 480 MS
EKG Q-T INTERVAL: 484 MS
EKG Q-T INTERVAL: 528 MS
EKG QRS DURATION: 158 MS
EKG QRS DURATION: 166 MS
EKG QRS DURATION: 172 MS
EKG QTC CALCULATION (BAZETT): 495 MS
EKG QTC CALCULATION (BAZETT): 499 MS
EKG QTC CALCULATION (BAZETT): 570 MS
EKG R AXIS: -75 DEGREES
EKG R AXIS: -84 DEGREES
EKG R AXIS: 268 DEGREES
EKG T AXIS: 127 DEGREES
EKG T AXIS: 127 DEGREES
EKG T AXIS: 8 DEGREES
EKG VENTRICULAR RATE: 63 BPM
EKG VENTRICULAR RATE: 65 BPM
EKG VENTRICULAR RATE: 70 BPM
INR BLD: 1.14 (ref 0.86–1.14)
PROTHROMBIN TIME: 13 SEC (ref 9.8–13)

## 2019-05-13 PROCEDURE — 93010 ELECTROCARDIOGRAM REPORT: CPT | Performed by: INTERNAL MEDICINE

## 2019-05-14 ENCOUNTER — ANTI-COAG VISIT (OUTPATIENT)
Dept: CARDIOLOGY CLINIC | Age: 69
End: 2019-05-14
Payer: COMMERCIAL

## 2019-05-14 DIAGNOSIS — I48.21 PERMANENT ATRIAL FIBRILLATION (HCC): ICD-10-CM

## 2019-05-14 PROCEDURE — 93793 ANTICOAG MGMT PT WARFARIN: CPT | Performed by: NURSE PRACTITIONER

## 2019-05-22 ENCOUNTER — OFFICE VISIT (OUTPATIENT)
Dept: CARDIOLOGY CLINIC | Age: 69
End: 2019-05-22
Payer: COMMERCIAL

## 2019-05-22 VITALS
SYSTOLIC BLOOD PRESSURE: 112 MMHG | DIASTOLIC BLOOD PRESSURE: 80 MMHG | HEART RATE: 76 BPM | BODY MASS INDEX: 35.69 KG/M2 | WEIGHT: 278 LBS

## 2019-05-22 DIAGNOSIS — I10 ESSENTIAL HYPERTENSION: ICD-10-CM

## 2019-05-22 DIAGNOSIS — Z92.89 H/O ANGIOGRAPHY: ICD-10-CM

## 2019-05-22 DIAGNOSIS — Z95.0 PACEMAKER: ICD-10-CM

## 2019-05-22 DIAGNOSIS — I48.91 ATRIAL FIBRILLATION, UNSPECIFIED TYPE (HCC): ICD-10-CM

## 2019-05-22 DIAGNOSIS — I48.0 PAROXYSMAL ATRIAL FIBRILLATION (HCC): ICD-10-CM

## 2019-05-22 DIAGNOSIS — I42.9 CARDIOMYOPATHY, UNSPECIFIED TYPE (HCC): ICD-10-CM

## 2019-05-22 LAB
INR BLD: 1.35 (ref 0.86–1.14)
PROTHROMBIN TIME: 15.4 SEC (ref 9.8–13)

## 2019-05-22 PROCEDURE — 4040F PNEUMOC VAC/ADMIN/RCVD: CPT | Performed by: NURSE PRACTITIONER

## 2019-05-22 PROCEDURE — G8417 CALC BMI ABV UP PARAM F/U: HCPCS | Performed by: NURSE PRACTITIONER

## 2019-05-22 PROCEDURE — 3017F COLORECTAL CA SCREEN DOC REV: CPT | Performed by: NURSE PRACTITIONER

## 2019-05-22 PROCEDURE — 1036F TOBACCO NON-USER: CPT | Performed by: NURSE PRACTITIONER

## 2019-05-22 PROCEDURE — 99214 OFFICE O/P EST MOD 30 MIN: CPT | Performed by: NURSE PRACTITIONER

## 2019-05-22 PROCEDURE — 1111F DSCHRG MED/CURRENT MED MERGE: CPT | Performed by: NURSE PRACTITIONER

## 2019-05-22 PROCEDURE — 1123F ACP DISCUSS/DSCN MKR DOCD: CPT | Performed by: NURSE PRACTITIONER

## 2019-05-22 PROCEDURE — G8427 DOCREV CUR MEDS BY ELIG CLIN: HCPCS | Performed by: NURSE PRACTITIONER

## 2019-05-22 RX ORDER — AMIODARONE HYDROCHLORIDE 100 MG/1
TABLET ORAL
Qty: 90 TABLET | Refills: 3 | Status: SHIPPED | OUTPATIENT
Start: 2019-05-22 | End: 2020-08-05 | Stop reason: DRUGHIGH

## 2019-05-22 RX ORDER — ASPIRIN 81 MG/1
81 TABLET, CHEWABLE ORAL DAILY
Qty: 90 TABLET | Refills: 5 | Status: SHIPPED | OUTPATIENT
Start: 2019-05-22 | End: 2022-01-18

## 2019-05-22 RX ORDER — ISOSORBIDE MONONITRATE 30 MG/1
30 TABLET, EXTENDED RELEASE ORAL DAILY
Qty: 90 TABLET | Refills: 3 | Status: SHIPPED | OUTPATIENT
Start: 2019-05-22 | End: 2020-08-12

## 2019-05-22 RX ORDER — CARVEDILOL 25 MG/1
25 TABLET ORAL 2 TIMES DAILY WITH MEALS
Qty: 180 TABLET | Refills: 3 | Status: SHIPPED | OUTPATIENT
Start: 2019-05-22 | End: 2020-08-12

## 2019-05-22 RX ORDER — AMLODIPINE BESYLATE 5 MG/1
5 TABLET ORAL DAILY
Qty: 90 TABLET | Refills: 3 | Status: SHIPPED | OUTPATIENT
Start: 2019-05-22 | End: 2020-05-14

## 2019-05-22 NOTE — PROGRESS NOTES
[address]  Office Visit           Birdie Maciel MD,  [address] APRN 270 [facility]       Cardiology           Esther Schirmer  1950    May 22, 2019    CC: here with CM / here after hosp visit for cp/ abn stress test and [facility] completed     HPI:  The patient is 81 y.o. male with CM  palpitations/Pt has [employer] Bi- V ICD w corvue implanted by Dr. Rai Alan. CM av paced /   hx PAF AV paced   CRD follows Dr. Lyubov Isaacs   Obesity /dicussed activity nutrition / has lost over 200#   NICM / no c/o PND or SULMA   SJM CRT-D: interrogated on 3/2019  / 10% afib / placed in 2015 by Dr. Weyman Gilford    renals 5/9/19   Medical renal disease. No hydronephrosis   Probable incidental splenic cavernous hemangioma     Echo 2016 EF was 50-55% Mild biatrial enlargement.   Sinus of valsalva appears to be mildly dilated.   Mild-moderate mitral regurgitation is present.   Mild pulmonic regurgitation present. [facility] 5/10/19 Left Main: Normal   Left Anterior Descending: Tortuosity with mild proximal calcification but no significant stenosis. There is a large septal  that has some ostial narrowing of 60-70%. Circumflex: Large vessel probably codominant. Mild plaquing but no significant stenoses. Right Coronary: The vessel is probably codominant. Mild plaque but no significant stenosis. Left Ventriculogram: Ejection fraction 30-35%. Anterior wall apex is akinetic suggesting regional wall motion abnormality but we do not find a coronary lesion that would cause this problem. Assessment: Mild coronary artery disease but does not require intervention. Cardiomyopathy ischemic appearing with regional wall motion in the anterior wall and apex akinesia and reduced ejection fraction  Recommendations: Optimize medical care continue dual antiplatelet therapy.    viewed most recent test with pt. with hx palpitations/ denies having any recent  palpitations      Review of Systems:  Constitutional: Denies  fatigue, weakness, night sweats or fever. HEENT: Denies new visual changes, ringing in ears, nosebleeds,nasal congestion  Respiratory: Denies new or change in SOB, PND, orthopnea or cough. Cardiovascular: see HPI  GI: Denies N/V, diarrhea, constipation, abdominal pain, change in bowel habits, melena or hematochezia  : Denies urinary frequency, urgency, incontinence, hematuria or dysuria. Skin: Denies rash, hives, or cyanosis  Musculoskeletal: Denies joint or muscle aches/pain  Neurological: Denies syncope or TIA-like symptoms. Psychiatric: Denies anxiety, insomnia or depression     Past Medical History:   Diagnosis Date    [facility]     CHF (congestive heart failure) ([facility])     Dr. Rausch(cardiologist)    CKD (chronic kidney disease) stage 3, GFR 30-59 ml/min ([facility])     Fracture     R index finger and L ankle    Gout     Hnands/feet/elbows    Hypertension     Pacemaker      Past Surgical History:   Procedure Laterality Date    CARDIAC CATHETERIZATION       Family History   Problem Relation Age of Onset    Heart Disease Father         CHF- of    High Blood Pressure Father     Heart Disease Mother     High Blood Pressure Mother     High Blood Pressure Brother         Stent placement x2     Social History     Tobacco Use    Smoking status: Passive Smoke Exposure - Never Smoker    Smokeless tobacco: Never Used   Substance Use Topics    Alcohol use:  Yes     Alcohol/week: 0.0 oz     Comment: occasionally    Drug use: Yes     Comment: Quit using in        Allergies   Allergen Reactions    Peanut-Containing Drug Products Anaphylaxis    Penicillins      Current Outpatient Medications   Medication Sig Dispense Refill    aspirin 81 MG chewable tablet Take 1 tablet by mouth daily 30 tablet 0    sodium bicarbonate 650 MG tablet Take 1 tablet by mouth 2 times daily 60 tablet 0    nitroGLYCERIN (NITROSTAT) 0.4 MG SL tablet up to max sounds; no organomegaly or bruits. Aorta normal  Extremities: No edema, cyanosis, or clubbing. Pulses are 2+ radial/carotid/dorsalis pedis bilaterally. Cap refill brisk. Neurological: No cranial nerve deficit. Psychiatric: He has a normal mood and affect. His speech is normal and behavior is normal.        Lab Results   Component Value Date    TRIG 70 05/09/2019    HDL 55 05/09/2019    LDLCALC 81 05/09/2019    LABVLDL 14 05/09/2019     Lab Results   Component Value Date     05/11/2019    K 4.8 05/11/2019    K 5.8 05/08/2019     05/11/2019    CO2 22 05/11/2019    BUN 36 05/11/2019    CREATININE 2.8 05/11/2019    GLUCOSE 101 05/11/2019    CALCIUM 9.8 05/11/2019      Lab Results   Component Value Date    WBC 5.0 05/11/2019    HGB 11.4 (L) 05/11/2019    HCT 35.2 (L) 05/11/2019    MCV 96.4 05/11/2019     (L) 05/11/2019         I have reviewed any available labs, images, any stress test, C on this pt       Please cc this note to PCP    Assessment:    Was in [address]  / abd stress test and [facility]   [facility] 5/10/19 Left Main: Normal   Left Anterior Descending: Tortuosity with mild proximal calcification but no significant stenosis. There is a large septal  that has some ostial narrowing of 60-70%. Circumflex: Large vessel probably codominant. Mild plaquing but no significant stenoses. Right Coronary: The vessel is probably codominant. Mild plaque but no significant stenosis. Left Ventriculogram: Ejection fraction 30-35%. Anterior wall apex is akinetic suggesting regional wall motion abnormality but we do not find a coronary lesion that would cause this problem. Assessment: Mild coronary artery disease but does not require intervention. Cardiomyopathy ischemic appearing with regional wall motion in the anterior wall and apex akinesia and reduced ejection fraction  Recommendations: Optimize medical care continue dual antiplatelet therapy.    viewed most recent test with pt. with hx palpitations/ denies having any recent  palpitations      Hx : ICD: CM AV paced   EKG V paced rate 60  SJM CRT-D: interrogated on 3/2019  / 10% afib / placed in 2015 by Dr. Cherry James      Mild moderate mitral regurg    Echo 2016 EF was 50-55% Mild biatrial enlargement.   Sinus of valsalva appears to be mildly dilated.   Mild-moderate mitral regurgitation is present.   Mild pulmonic regurgitation present. Echo PTV       SEN/CRI   sCr 3.4 >2.5>2.8  (usually 2-3 range)   Dr. Preet Valerio following      hx afib  [id number] Score for Atrial Fibrillation Stroke Risk    Risk   Factors   Component Value   C CHF Yes 1   H HTN Yes 1   A2 Age >= 76 No,  (80 y.o.) 0   D DM No 0   S2 Prior Stroke/TIA No 0   V Vascular Disease No 0   A Age 74-69 Yes,  (80 y.o.) 1   Sc Sex male 0     [id number]  Score   3   Anticoagulated INR 1.27 / on coumadin   / discussed Nadiya Larger does want to change  denies bleeding /  falls      HTN   Optimal     HLD   LDL      Home card meds coreg pacerone Imdur coumadin  lipitor asa norvasc mag ox   No ace/arb /ARNI d/t CRI / follows Neph     Plan:  Echo blood work PTV   Fu in 4 months with [facility] interrogation       Thanks for allowing me to participate in the care of this patient.     Please cc this note to PCP / AUSTIN Sorenson Complex Repair And Double Advancement Flap Text: The defect edges were debeveled with a #15 scalpel blade.  The primary defect was closed partially with a complex linear closure.  Given the location of the remaining defect, shape of the defect and the proximity to free margins a double advancement flap was deemed most appropriate for complete closure of the defect.  Using a sterile surgical marker, an appropriate advancement flap was drawn incorporating the defect and placing the expected incisions within the relaxed skin tension lines where possible.    The area thus outlined was incised deep to adipose tissue with a #15 scalpel blade.  The skin margins were undermined to an appropriate distance in all directions utilizing iris scissors.

## 2019-05-23 ENCOUNTER — ANTI-COAG VISIT (OUTPATIENT)
Dept: CARDIOLOGY CLINIC | Age: 69
End: 2019-05-23

## 2019-05-23 NOTE — PROGRESS NOTES
ANTICOAGULATION MONITORING    Johnathon Goncalves, 1950      Anticoagulation Indication PAF non valvular  Persistent   Referring Physician:   Opal Pham  Goal INR Range:  2/3Duration of Anticoagulation Therapy:      Lab Draw:  Product patient has at home: warfarin 5  mg    Recent INR Results:  Lab Results   Component Value Date    INR 1.35 (H) 05/22/2019    INR 1.14 05/13/2019    INR 1.12 05/10/2019    INR 1.27 (H) 05/08/2019       INR Summary                          Warfarin regimen (mg)  Date INR A/P Sun Mon Tue Wed Thu Fri Sat Mg/wk                5/23/19 1.3 Below goal 7.5mg 7.5mg 7.5mg 7.5mg 7.5mg 7.5mg 7.5mg 52.5   5/14/19 1.1 Below goal 5mg 7.5mg 7.5mg 7.5mg 7.5mg 7.5mg 7.5mg 50mg   4/29/19 1.9 Below goal 7.5mg 7.5mg 7.5mg 7.5mg 7.5mg 5mg 7.5mg 50mg   4/15/19 3.9 Above goal 7.5mg 7.5mg 5mg 7.5mg 7.5mg 5mg 7.5mg 47.5   4/3/19 4.3 Above goals 7.5mg 7.5mg 7.5mg 7.5mg 7.5mg hold hold 37.5   3/25/19 6.6 Above goals           3/7/19 1.0 Below goal 10mg 10mg 10mg 10mg 10mg 10mg 10mg 70mg                                                  Patient's INR was  below goal today, so patient was instructed to take 7.5mg qd, Mila Garcia states that he did eat extra greens over weekend  . Patient was also reminded to maintain consistent vitamin K intake and call with any bleeding, medication changes, or fever/vomiting/diarrhea. Next INR check:  5/9/19  Patient confirmed new dosing instructions 5mg on Sunday and 7.5 mg all other days.      Addendum:

## 2019-06-10 DIAGNOSIS — I10 ESSENTIAL HYPERTENSION: ICD-10-CM

## 2019-06-10 DIAGNOSIS — I48.91 ATRIAL FIBRILLATION, UNSPECIFIED TYPE (HCC): ICD-10-CM

## 2019-06-10 DIAGNOSIS — Z95.0 PACEMAKER: ICD-10-CM

## 2019-06-10 DIAGNOSIS — I42.9 CARDIOMYOPATHY, UNSPECIFIED TYPE (HCC): ICD-10-CM

## 2019-06-10 DIAGNOSIS — I48.0 PAROXYSMAL ATRIAL FIBRILLATION (HCC): ICD-10-CM

## 2019-06-10 LAB
INR BLD: 2.16 (ref 0.86–1.14)
PROTHROMBIN TIME: 24.6 SEC (ref 9.8–13)

## 2019-06-12 ENCOUNTER — ANTI-COAG VISIT (OUTPATIENT)
Dept: CARDIOLOGY CLINIC | Age: 69
End: 2019-06-12
Payer: COMMERCIAL

## 2019-06-12 DIAGNOSIS — I48.21 PERMANENT ATRIAL FIBRILLATION (HCC): ICD-10-CM

## 2019-06-12 PROCEDURE — 93793 ANTICOAG MGMT PT WARFARIN: CPT | Performed by: NURSE PRACTITIONER

## 2019-06-12 NOTE — PROGRESS NOTES
ANTICOAGULATION MONITORING    Nisa Payan, 1950      Anticoagulation Indication PAF non valvular  Persistent   Referring Physician:   Zoila Avalos  Goal INR Range:  2/3Duration of Anticoagulation Therapy:      Lab Draw:  Product patient has at home: warfarin 5  mg    Recent INR Results:  Lab Results   Component Value Date    INR 2.16 (H) 06/10/2019    INR 1.35 (H) 05/22/2019    INR 1.14 05/13/2019    INR 1.12 05/10/2019       INR Summary                          Warfarin regimen (mg)  Date INR A/P Sun Mon Tue Wed Thu Fri Sat Mg/wk   6/10/19 2.6 At goal 5mg 7.5mg 7.5mg 7.5mg 7.5mg 7.5mg 7.5mg 50 mg   5/23/19 1.3 Below goal 7.5mg 7.5mg 7.5mg 7.5mg 7.5mg 7.5mg 7.5mg 52.5   5/14/19 1.1 Below goal 5mg 7.5mg 7.5mg 7.5mg 7.5mg 7.5mg 7.5mg 50mg   4/29/19 1.9 Below goal 7.5mg 7.5mg 7.5mg 7.5mg 7.5mg 5mg 7.5mg 50mg   4/15/19 3.9 Above goal 7.5mg 7.5mg 5mg 7.5mg 7.5mg 5mg 7.5mg 47.5   4/3/19 4.3 Above goals 7.5mg 7.5mg 7.5mg 7.5mg 7.5mg hold hold 37.5   3/25/19 6.6 Above goals           3/7/19 1.0 Below goal 10mg 10mg 10mg 10mg 10mg 10mg 10mg 70mg                                                  Patient's INR was at goal today, so patient was instructed to take 7.5mg qd and 5mg on Sunday. Patient was also reminded to maintain consistent vitamin K intake and call with any bleeding, medication changes, or fever/vomiting/diarrhea. Next INR check:  7/3/19  Patient confirmed new dosing instructions 5mg on Sunday and 7.5 mg all other days.      Addendum:  Reviewed INR   At goal /  at goal INR   Next date blood to be drawn  7/3/19    AUSTIN Boone

## 2019-06-19 ENCOUNTER — TELEPHONE (OUTPATIENT)
Dept: CARDIOLOGY CLINIC | Age: 69
End: 2019-06-19

## 2019-06-22 DIAGNOSIS — I48.0 PAROXYSMAL ATRIAL FIBRILLATION (HCC): ICD-10-CM

## 2019-06-22 DIAGNOSIS — I10 ESSENTIAL HYPERTENSION: ICD-10-CM

## 2019-06-22 DIAGNOSIS — Z95.0 PACEMAKER: ICD-10-CM

## 2019-06-24 RX ORDER — WARFARIN SODIUM 5 MG/1
TABLET ORAL
Qty: 60 TABLET | Refills: 0 | Status: SHIPPED | OUTPATIENT
Start: 2019-06-24 | End: 2019-07-24 | Stop reason: SDUPTHER

## 2019-07-02 ENCOUNTER — NURSE ONLY (OUTPATIENT)
Dept: CARDIOLOGY CLINIC | Age: 69
End: 2019-07-02
Payer: COMMERCIAL

## 2019-07-02 DIAGNOSIS — I25.5 ISCHEMIC CARDIOMYOPATHY: ICD-10-CM

## 2019-07-02 DIAGNOSIS — Z95.810 ICD (IMPLANTABLE CARDIOVERTER-DEFIBRILLATOR) IN PLACE: ICD-10-CM

## 2019-07-02 DIAGNOSIS — I42.9 CARDIOMYOPATHY (HCC): ICD-10-CM

## 2019-07-03 ENCOUNTER — TELEPHONE (OUTPATIENT)
Dept: CARDIOLOGY CLINIC | Age: 69
End: 2019-07-03

## 2019-07-05 PROCEDURE — 93295 DEV INTERROG REMOTE 1/2/MLT: CPT | Performed by: INTERNAL MEDICINE

## 2019-07-05 PROCEDURE — 93296 REM INTERROG EVL PM/IDS: CPT | Performed by: INTERNAL MEDICINE

## 2019-07-10 DIAGNOSIS — Z95.0 PACEMAKER: ICD-10-CM

## 2019-07-10 DIAGNOSIS — I10 ESSENTIAL HYPERTENSION: ICD-10-CM

## 2019-07-10 DIAGNOSIS — I42.9 CARDIOMYOPATHY, UNSPECIFIED TYPE (HCC): ICD-10-CM

## 2019-07-10 DIAGNOSIS — I48.0 PAROXYSMAL ATRIAL FIBRILLATION (HCC): ICD-10-CM

## 2019-07-10 DIAGNOSIS — I48.91 ATRIAL FIBRILLATION, UNSPECIFIED TYPE (HCC): ICD-10-CM

## 2019-07-10 LAB
INR BLD: 1.12 (ref 0.86–1.14)
PROTHROMBIN TIME: 12.8 SEC (ref 9.8–13)

## 2019-07-11 ENCOUNTER — TELEPHONE (OUTPATIENT)
Dept: CARDIOLOGY CLINIC | Age: 69
End: 2019-07-11

## 2019-07-11 PROCEDURE — 93793 ANTICOAG MGMT PT WARFARIN: CPT | Performed by: NURSE PRACTITIONER

## 2019-07-12 ENCOUNTER — ANTI-COAG VISIT (OUTPATIENT)
Dept: CARDIOLOGY CLINIC | Age: 69
End: 2019-07-12
Payer: COMMERCIAL

## 2019-07-12 DIAGNOSIS — I48.21 PERMANENT ATRIAL FIBRILLATION (HCC): ICD-10-CM

## 2019-07-24 DIAGNOSIS — I10 ESSENTIAL HYPERTENSION: ICD-10-CM

## 2019-07-24 DIAGNOSIS — I48.0 PAROXYSMAL ATRIAL FIBRILLATION (HCC): ICD-10-CM

## 2019-07-24 DIAGNOSIS — I42.9 CARDIOMYOPATHY, UNSPECIFIED TYPE (HCC): ICD-10-CM

## 2019-07-24 DIAGNOSIS — I48.91 ATRIAL FIBRILLATION, UNSPECIFIED TYPE (HCC): ICD-10-CM

## 2019-07-24 DIAGNOSIS — Z95.0 PACEMAKER: ICD-10-CM

## 2019-07-24 LAB
INR BLD: 2.08 (ref 0.86–1.14)
PROTHROMBIN TIME: 23.7 SEC (ref 9.8–13)

## 2019-07-24 RX ORDER — WARFARIN SODIUM 5 MG/1
TABLET ORAL
Qty: 60 TABLET | Refills: 1 | Status: SHIPPED | OUTPATIENT
Start: 2019-07-24 | End: 2019-09-22 | Stop reason: SDUPTHER

## 2019-07-25 ENCOUNTER — ANTI-COAG VISIT (OUTPATIENT)
Dept: CARDIOLOGY CLINIC | Age: 69
End: 2019-07-25

## 2019-07-25 NOTE — PROGRESS NOTES
ANTICOAGULATION MONITORING    Ketty Ray, 1950      Anticoagulation Indication PAF non valvular  Persistent   Referring Physician:   Jolie Calles  Goal INR Range:  2/3Duration of Anticoagulation Therapy:      Lab Draw:  Product patient has at home: warfarin 5  mg    Recent INR Results:  Lab Results   Component Value Date    INR 2.08 (H) 07/24/2019    INR 1.12 07/10/2019    INR 2.16 (H) 06/10/2019    INR 1.35 (H) 05/22/2019       INR Summary                          Warfarin regimen (mg)  Date INR A/P Sun Mon Tue Wed Thu Fri Sat Mg/wk   7/11/19 1.1 Below goal 7.5mg 7.5mg 7.5mg 7.5mg 7.5mg 7.5mg 7.5mg 52.5   6/10/19 2.6 At goal 5mg 7.5mg 7.5mg 7.5mg 7.5mg 7.5mg 7.5mg 50 mg   5/23/19 1.3 Below goal 7.5mg 7.5mg 7.5mg 7.5mg 7.5mg 7.5mg 7.5mg 52.5   5/14/19 1.1 Below goal 5mg 7.5mg 7.5mg 7.5mg 7.5mg 7.5mg 7.5mg 50mg   4/29/19 1.9 Below goal 7.5mg 7.5mg 7.5mg 7.5mg 7.5mg 5mg 7.5mg 50mg   4/15/19 3.9 Above goal 7.5mg 7.5mg 5mg 7.5mg 7.5mg 5mg 7.5mg 47.5   4/3/19 4.3 Above goals 7.5mg 7.5mg 7.5mg 7.5mg 7.5mg hold hold 37.5   3/25/19 6.6 Above goals           3/7/19 1.0 Below goal 10mg 10mg 10mg 10mg 10mg 10mg 10mg 70mg                                                ANTICOAGULATION MONITORING    Ketty Ray, 1950      Anticoagulation Indication PAF non valvular  Persistent   Referring Physician:   Jolie Calles  Goal INR Range:  2/3Duration of Anticoagulation Therapy:      Lab Draw:  Product patient has at home: warfarin 5  mg    Recent INR Results:  Lab Results   Component Value Date    INR 2.08 (H) 07/24/2019    INR 1.12 07/10/2019    INR 2.16 (H) 06/10/2019    INR 1.35 (H) 05/22/2019       INR Summary                          Warfarin regimen (mg)  Date INR A/P Sun Mon Tue Wed Thu Fri Sat Mg/wk   7/24/19 2.0 At goal 7.5mg 7.5mg 7.5mg 7.5mg 7.5mg 7.5mg 7.5mg 52.5   7/10/19 1.1 Below goal 7.5mg 7.5mg 7.5mg 7.5mg 7.5mg 7.5mg 7.5mg 52.5   6/10/19 2.6 At goal 5mg 7.5mg 7.5mg 7.5mg 7.5mg 7.5mg 7.5mg 50 mg

## 2019-08-08 RX ORDER — SILDENAFIL 100 MG/1
TABLET, FILM COATED ORAL
Qty: 10 TABLET | Refills: 2 | Status: SHIPPED | OUTPATIENT
Start: 2019-08-08 | End: 2019-11-30 | Stop reason: SDUPTHER

## 2019-08-28 DIAGNOSIS — I10 ESSENTIAL HYPERTENSION: ICD-10-CM

## 2019-08-28 DIAGNOSIS — Z95.0 PACEMAKER: ICD-10-CM

## 2019-08-28 DIAGNOSIS — I42.9 CARDIOMYOPATHY, UNSPECIFIED TYPE (HCC): ICD-10-CM

## 2019-08-28 DIAGNOSIS — I48.0 PAROXYSMAL ATRIAL FIBRILLATION (HCC): ICD-10-CM

## 2019-08-28 DIAGNOSIS — I48.91 ATRIAL FIBRILLATION, UNSPECIFIED TYPE (HCC): ICD-10-CM

## 2019-08-28 LAB
INR BLD: 1.05 (ref 0.86–1.14)
PROTHROMBIN TIME: 12 SEC (ref 9.8–13)

## 2019-08-29 ENCOUNTER — ANTI-COAG VISIT (OUTPATIENT)
Dept: CARDIOLOGY CLINIC | Age: 69
End: 2019-08-29
Payer: COMMERCIAL

## 2019-08-29 DIAGNOSIS — I48.21 PERMANENT ATRIAL FIBRILLATION (HCC): ICD-10-CM

## 2019-08-29 PROCEDURE — 93793 ANTICOAG MGMT PT WARFARIN: CPT | Performed by: NURSE PRACTITIONER

## 2019-09-18 DIAGNOSIS — Z95.0 PACEMAKER: ICD-10-CM

## 2019-09-18 DIAGNOSIS — I48.91 ATRIAL FIBRILLATION, UNSPECIFIED TYPE (HCC): ICD-10-CM

## 2019-09-18 DIAGNOSIS — I42.9 CARDIOMYOPATHY, UNSPECIFIED TYPE (HCC): ICD-10-CM

## 2019-09-18 DIAGNOSIS — I48.0 PAROXYSMAL ATRIAL FIBRILLATION (HCC): ICD-10-CM

## 2019-09-18 DIAGNOSIS — I10 ESSENTIAL HYPERTENSION: ICD-10-CM

## 2019-09-18 LAB
INR BLD: 2.29 (ref 0.86–1.14)
PROTHROMBIN TIME: 26.1 SEC (ref 9.8–13)

## 2019-09-19 ENCOUNTER — ANTI-COAG VISIT (OUTPATIENT)
Dept: CARDIOLOGY CLINIC | Age: 69
End: 2019-09-19

## 2019-09-19 ENCOUNTER — TELEPHONE (OUTPATIENT)
Dept: CARDIOLOGY CLINIC | Age: 69
End: 2019-09-19

## 2019-09-19 NOTE — PROGRESS NOTES
Below goal 7.5mg 7.5mg 7.5mg 7.5mg 7.5mg 7.5mg 7.5mg 52.5   6/10/19 2.6 At goal 5mg 7.5mg 7.5mg 7.5mg 7.5mg 7.5mg 7.5mg 50 mg   5/23/19 1.3 Below goal 7.5mg 7.5mg 7.5mg 7.5mg 7.5mg 7.5mg 7.5mg 52.5   5/14/19 1.1 Below goal 5mg 7.5mg 7.5mg 7.5mg 7.5mg 7.5mg 7.5mg 50mg   4/29/19 1.9 Below goal 7.5mg 7.5mg 7.5mg 7.5mg 7.5mg 5mg 7.5mg 50mg   4/15/19 3.9 Above goal 7.5mg 7.5mg 5mg 7.5mg 7.5mg 5mg 7.5mg 47.5   4/3/19 4.3 Above goals 7.5mg 7.5mg 7.5mg 7.5mg 7.5mg hold hold 37.5   3/25/19 6.6 Above goals           3/7/19 1.0 Below goal 10mg 10mg 10mg 10mg 10mg 10mg 10mg 70mg                                                  Patient's INR was below goal today, so patient was instructed to take 7.5mg qd  Patient was also reminded to maintain consistent vitamin K intake and call with any bleeding, medication changes, or fever/vomiting/diarrhea. Next INR check:  8/15//19  Patient confirmed new dosing instructions  7.5 mg every day. Addendum:  Reviewed INR 2.0  At goal /  at goal INR   Next date blood to be drawn  8/15/19    Joe Doshi APRN, CVNP FNP          Patient's INR was below goal today, so patient was instructed to take 7.5mg qd and 10mg on Thursday  Patient was also reminded to maintain consistent vitamin K intake and call with any bleeding, medication changes, or fever/vomiting/diarrhea. Next INR check:  10/23//19  Patient confirmed new dosing instructions 5mg on Sunday and 7.5 mg all other days.      Addendum:  Reviewed INR   Below  goal    Next date blood to be drawn  10/23/19   Reviewed INR      Joe Doshi APRN, CVNP FNP

## 2019-09-22 DIAGNOSIS — Z95.0 PACEMAKER: ICD-10-CM

## 2019-09-22 DIAGNOSIS — I10 ESSENTIAL HYPERTENSION: ICD-10-CM

## 2019-09-22 DIAGNOSIS — I48.0 PAROXYSMAL ATRIAL FIBRILLATION (HCC): ICD-10-CM

## 2019-09-23 RX ORDER — WARFARIN SODIUM 5 MG/1
TABLET ORAL
Qty: 60 TABLET | Refills: 1 | Status: SHIPPED | OUTPATIENT
Start: 2019-09-23 | End: 2019-11-28 | Stop reason: SDUPTHER

## 2019-10-03 ENCOUNTER — OFFICE VISIT (OUTPATIENT)
Dept: CARDIOLOGY CLINIC | Age: 69
End: 2019-10-03
Payer: COMMERCIAL

## 2019-10-03 VITALS
DIASTOLIC BLOOD PRESSURE: 100 MMHG | SYSTOLIC BLOOD PRESSURE: 138 MMHG | WEIGHT: 283 LBS | HEART RATE: 76 BPM | BODY MASS INDEX: 36.34 KG/M2

## 2019-10-03 DIAGNOSIS — I48.21 PERMANENT ATRIAL FIBRILLATION (HCC): ICD-10-CM

## 2019-10-03 DIAGNOSIS — Z95.810 ICD (IMPLANTABLE CARDIOVERTER-DEFIBRILLATOR) IN PLACE: Primary | ICD-10-CM

## 2019-10-03 PROCEDURE — G8484 FLU IMMUNIZE NO ADMIN: HCPCS | Performed by: INTERNAL MEDICINE

## 2019-10-03 PROCEDURE — 1036F TOBACCO NON-USER: CPT | Performed by: INTERNAL MEDICINE

## 2019-10-03 PROCEDURE — 99214 OFFICE O/P EST MOD 30 MIN: CPT | Performed by: INTERNAL MEDICINE

## 2019-10-03 PROCEDURE — G8598 ASA/ANTIPLAT THER USED: HCPCS | Performed by: INTERNAL MEDICINE

## 2019-10-03 PROCEDURE — G8417 CALC BMI ABV UP PARAM F/U: HCPCS | Performed by: INTERNAL MEDICINE

## 2019-10-03 PROCEDURE — G8427 DOCREV CUR MEDS BY ELIG CLIN: HCPCS | Performed by: INTERNAL MEDICINE

## 2019-10-03 PROCEDURE — 93000 ELECTROCARDIOGRAM COMPLETE: CPT | Performed by: INTERNAL MEDICINE

## 2019-10-03 PROCEDURE — 3017F COLORECTAL CA SCREEN DOC REV: CPT | Performed by: INTERNAL MEDICINE

## 2019-10-03 PROCEDURE — 1123F ACP DISCUSS/DSCN MKR DOCD: CPT | Performed by: INTERNAL MEDICINE

## 2019-10-03 PROCEDURE — 4040F PNEUMOC VAC/ADMIN/RCVD: CPT | Performed by: INTERNAL MEDICINE

## 2019-10-29 ENCOUNTER — NURSE ONLY (OUTPATIENT)
Dept: CARDIOLOGY CLINIC | Age: 69
End: 2019-10-29
Payer: COMMERCIAL

## 2019-10-29 DIAGNOSIS — Z95.810 ICD (IMPLANTABLE CARDIOVERTER-DEFIBRILLATOR) IN PLACE: ICD-10-CM

## 2019-10-29 DIAGNOSIS — I25.5 ISCHEMIC CARDIOMYOPATHY: ICD-10-CM

## 2019-10-29 DIAGNOSIS — I42.9 CARDIOMYOPATHY (HCC): ICD-10-CM

## 2019-10-29 PROCEDURE — 93295 DEV INTERROG REMOTE 1/2/MLT: CPT | Performed by: INTERNAL MEDICINE

## 2019-10-29 PROCEDURE — 93296 REM INTERROG EVL PM/IDS: CPT | Performed by: INTERNAL MEDICINE

## 2019-11-28 DIAGNOSIS — I48.0 PAROXYSMAL ATRIAL FIBRILLATION (HCC): ICD-10-CM

## 2019-11-28 DIAGNOSIS — I10 ESSENTIAL HYPERTENSION: ICD-10-CM

## 2019-11-28 DIAGNOSIS — Z95.0 PACEMAKER: ICD-10-CM

## 2019-11-30 RX ORDER — WARFARIN SODIUM 5 MG/1
TABLET ORAL
Qty: 60 TABLET | Refills: 2 | Status: SHIPPED | OUTPATIENT
Start: 2019-11-30 | End: 2020-11-06

## 2019-12-02 RX ORDER — SILDENAFIL 100 MG/1
TABLET, FILM COATED ORAL
Qty: 10 TABLET | Refills: 1 | Status: SHIPPED | OUTPATIENT
Start: 2019-12-02 | End: 2020-03-03

## 2020-01-02 ENCOUNTER — ANTI-COAG VISIT (OUTPATIENT)
Dept: CARDIOLOGY CLINIC | Age: 70
End: 2020-01-02

## 2020-01-02 DIAGNOSIS — I48.91 ATRIAL FIBRILLATION, UNSPECIFIED TYPE (HCC): ICD-10-CM

## 2020-01-02 DIAGNOSIS — I10 ESSENTIAL HYPERTENSION: ICD-10-CM

## 2020-01-02 DIAGNOSIS — I42.9 CARDIOMYOPATHY, UNSPECIFIED TYPE (HCC): ICD-10-CM

## 2020-01-02 DIAGNOSIS — I48.0 PAROXYSMAL ATRIAL FIBRILLATION (HCC): ICD-10-CM

## 2020-01-02 DIAGNOSIS — Z95.0 PACEMAKER: ICD-10-CM

## 2020-01-02 LAB
INR BLD: 1.15 (ref 0.86–1.14)
PROTHROMBIN TIME: 13.4 SEC (ref 10–13.2)

## 2020-01-02 NOTE — PROGRESS NOTES
ANTICOAGULATION MONITORING    Jeffry [facility], 1950      Anticoagulation Indication PAF non valvular  Persistent   Referring Physician:   Lora Mcrae  Goal INR Range:  2/3Duration of Anticoagulation Therapy:      Lab Draw:  Product patient has at home: warfarin 5  mg    Recent INR Results:  Lab Results   Component Value Date    INR 1.15 (H) 01/02/2020    INR 2.29 (H) 09/18/2019    INR 1.05 08/28/2019    INR 2.08 (H) 07/24/2019       INR Summary                          Warfarin regimen (mg)  Date INR A/P Sun Mon Tue Wed Thu Fri Sat Mg/wk   7/11/19 1.1 Below goal 7.5mg 7.5mg 7.5mg 7.5mg 7.5mg 7.5mg 7.5mg 52.5   6/10/19 2.6 At goal 5mg 7.5mg 7.5mg 7.5mg 7.5mg 7.5mg 7.5mg 50 mg   5/23/19 1.3 Below goal 7.5mg 7.5mg 7.5mg 7.5mg 7.5mg 7.5mg 7.5mg 52.5   5/14/19 1.1 Below goal 5mg 7.5mg 7.5mg 7.5mg 7.5mg 7.5mg 7.5mg 50mg   4/29/19 1.9 Below goal 7.5mg 7.5mg 7.5mg 7.5mg 7.5mg 5mg 7.5mg 50mg   4/15/19 3.9 Above goal 7.5mg 7.5mg 5mg 7.5mg 7.5mg 5mg 7.5mg 47.5   4/3/19 4.3 Above goals 7.5mg 7.5mg 7.5mg 7.5mg 7.5mg hold hold 37.5   3/25/19 6.6 Above goals           3/7/19 1.0 Below goal 10mg 10mg 10mg 10mg 10mg 10mg 10mg 70mg                                                ANTICOAGULATION MONITORING    Jeffry [facility], 1950      Anticoagulation Indication PAF non valvular  Persistent   Referring Physician:   Lora Mcrae  Goal INR Range:  2/3Duration of Anticoagulation Therapy:      Lab Draw:  Product patient has at home: warfarin 5  mg    Recent INR Results:  Lab Results   Component Value Date    INR 1.15 (H) 01/02/2020    INR 2.29 (H) 09/18/2019    INR 1.05 08/28/2019    INR 2.08 (H) 07/24/2019       INR Summary                          Warfarin regimen (mg)  Date INR A/P Sun Mon Tue Wed Thu Fri Sat Mg/wk   1/2/20 1.1 Below goal           9/18/19   2.2 At goal 7.5mg 7.5mg 7.5mg 7.5mg 10mg 7.5mg 7.5mg 55mg   8/28/19 1.0 Below goal 7.5mg 7.5mg 7.5mg 7.5mg 10mg 7.5mg 7.5mg 55mg   7/24/19 2.0 At goal 7.5mg 7.5mg 7.5mg 7.5mg 7.5mg 7.5mg 7.5mg 52.5   7/10/19 1.1 Below goal 7.5mg 7.5mg 7.5mg 7.5mg 7.5mg 7.5mg 7.5mg 52.5   6/10/19 2.6 At goal 5mg 7.5mg 7.5mg 7.5mg 7.5mg 7.5mg 7.5mg 50 mg   5/23/19 1.3 Below goal 7.5mg 7.5mg 7.5mg 7.5mg 7.5mg 7.5mg 7.5mg 52.5   5/14/19 1.1 Below goal 5mg 7.5mg 7.5mg 7.5mg 7.5mg 7.5mg 7.5mg 50mg   4/29/19 1.9 Below goal 7.5mg 7.5mg 7.5mg 7.5mg 7.5mg 5mg 7.5mg 50mg   4/15/19 3.9 Above goal 7.5mg 7.5mg 5mg 7.5mg 7.5mg 5mg 7.5mg 47.5   4/3/19 4.3 Above goals 7.5mg 7.5mg 7.5mg 7.5mg 7.5mg hold hold 37.5   3/25/19 6.6 Above goals           3/7/19 1.0 Below goal 10mg 10mg 10mg 10mg 10mg 10mg 10mg 70mg                                                  Patient's INR was below goal today. Patient states he was waking up with nose bleeds so he interrupted his coumadin dose for 4 days then went for labwork. He has now resumed his usual dose. Yaw Nunes was instructed to get a protime in 1 week and continue his same dose of coumadin and to call office first if there is any further bleeding issues. .Patient has been very non-compliant not having labwork in the last 4 months. Patient was also reminded to maintain consistent vitamin K intake and call with any bleeding, medication changes, or fever/vomiting/diarrhea. Next INR check:1/16/20  Patient confirmed new dosing instructions  7.5 mg every day. Addendum:  Reviewed INR 1.1 Below goal         Darnell Nam APRN, CVNP FNP          Patient's INR was below goal today, so patient was instructed to take 7.5mg qd and 10mg on Thursday  Patient was also reminded to maintain consistent vitamin K intake and call with any bleeding, medication changes, or fever/vomiting/diarrhea. Next INR check:  1/13/20  Patient confirmed new dosing instructions 5mg on Sunday and 7.5 mg all other days.      Addendum:  Reviewed INR   Below  goal    Next date blood to be drawn  10/23/19   Reviewed INR      Darnell Nam GARCIA, AUSTIN FNP

## 2020-01-03 ENCOUNTER — TELEPHONE (OUTPATIENT)
Dept: CARDIOLOGY CLINIC | Age: 70
End: 2020-01-03

## 2020-01-28 ENCOUNTER — NURSE ONLY (OUTPATIENT)
Dept: CARDIOLOGY CLINIC | Age: 70
End: 2020-01-28

## 2020-02-06 ENCOUNTER — ANTI-COAG VISIT (OUTPATIENT)
Dept: CARDIOLOGY CLINIC | Age: 70
End: 2020-02-06
Payer: COMMERCIAL

## 2020-02-06 DIAGNOSIS — I48.91 ATRIAL FIBRILLATION, UNSPECIFIED TYPE (HCC): ICD-10-CM

## 2020-02-06 DIAGNOSIS — I48.0 PAROXYSMAL ATRIAL FIBRILLATION (HCC): ICD-10-CM

## 2020-02-06 DIAGNOSIS — I10 ESSENTIAL HYPERTENSION: ICD-10-CM

## 2020-02-06 DIAGNOSIS — Z95.0 PACEMAKER: ICD-10-CM

## 2020-02-06 DIAGNOSIS — I42.9 CARDIOMYOPATHY, UNSPECIFIED TYPE (HCC): ICD-10-CM

## 2020-02-06 LAB
INR BLD: 1.77 (ref 0.86–1.14)
PROTHROMBIN TIME: 20.6 SEC (ref 10–13.2)

## 2020-02-06 PROCEDURE — 93793 ANTICOAG MGMT PT WARFARIN: CPT | Performed by: NURSE PRACTITIONER

## 2020-02-06 NOTE — PROGRESS NOTES
2.0 At goal 7.5mg 7.5mg 7.5mg 7.5mg 7.5mg 7.5mg 7.5mg 52.5   7/10/19 1.1 Below goal 7.5mg 7.5mg 7.5mg 7.5mg 7.5mg 7.5mg 7.5mg 52.5   6/10/19 2.6 At goal 5mg 7.5mg 7.5mg 7.5mg 7.5mg 7.5mg 7.5mg 50 mg   5/23/19 1.3 Below goal 7.5mg 7.5mg 7.5mg 7.5mg 7.5mg 7.5mg 7.5mg 52.5   5/14/19 1.1 Below goal 5mg 7.5mg 7.5mg 7.5mg 7.5mg 7.5mg 7.5mg 50mg   4/29/19 1.9 Below goal 7.5mg 7.5mg 7.5mg 7.5mg 7.5mg 5mg 7.5mg 50mg   4/15/19 3.9 Above goal 7.5mg 7.5mg 5mg 7.5mg 7.5mg 5mg 7.5mg 47.5   4/3/19 4.3 Above goals 7.5mg 7.5mg 7.5mg 7.5mg 7.5mg hold hold 37.5   3/25/19 6.6 Above goals           3/7/19 1.0 Below goal 10mg 10mg 10mg 10mg 10mg 10mg 10mg 70mg                                                  Patient's INR was below goal today. Patient states he was waking up with nose bleeds so he interrupted his coumadin dose for 4 days then went for labwork. He has now resumed his usual dose. Ronen Moon was instructed to get a protime in 1 week and continue his same dose of coumadin and to call office first if there is any further bleeding issues. .Patient has been very non-compliant not having labwork in the last 4 months. Patient was also reminded to maintain consistent vitamin K intake and call with any bleeding, medication changes, or fever/vomiting/diarrhea. Next INR check:2/13/20  Patient confirmed new dosing instructions  7.5 mg every day. Addendum:  Reviewed INR 1.7 Below goal         Major Brood APRN, CVNP FNP          Patient's INR was below goal today, so patient was instructed to take 7.5mg on Thursday and 10 mg all other days. Patient was also reminded to maintain consistent vitamin K intake and call with any bleeding, medication changes, or fever/vomiting/diarrhea. Next INR check:  2/13/20  Patient confirmed new dosing instructions 7.5 on Thursaday  and 10 mg all other days.      Addendum:  Reviewed INR   Below  goal    Next date blood to be drawn  2/13/20   Reviewed INR      Major Brood APRN, CVNP FNP

## 2020-03-03 RX ORDER — SILDENAFIL 100 MG/1
TABLET, FILM COATED ORAL
Qty: 10 TABLET | Refills: 0 | Status: SHIPPED | OUTPATIENT
Start: 2020-03-03 | End: 2020-04-06

## 2020-03-03 NOTE — TELEPHONE ENCOUNTER
[facility] Medication Refills:    sildenafil (VIAGRA) 100 MG tablet    Number: 10  Refills: 3    Last Office Visit: 10/3/19    Next Office Visit:4/9/20

## 2020-03-11 LAB
INR BLD: 2.46 (ref 0.86–1.14)
PROTHROMBIN TIME: 28.8 SEC (ref 10–13.2)

## 2020-03-12 ENCOUNTER — TELEPHONE (OUTPATIENT)
Dept: CARDIOLOGY CLINIC | Age: 70
End: 2020-03-12

## 2020-03-12 ENCOUNTER — ANTI-COAG VISIT (OUTPATIENT)
Dept: CARDIOLOGY CLINIC | Age: 70
End: 2020-03-12

## 2020-03-12 NOTE — TELEPHONE ENCOUNTER
Patient wife called in requesting pt/inr results from yesterday would like to be called back at [phone].

## 2020-04-06 RX ORDER — SILDENAFIL 100 MG/1
TABLET, FILM COATED ORAL
Qty: 10 TABLET | Refills: 0 | Status: SHIPPED | OUTPATIENT
Start: 2020-04-06 | End: 2020-05-05

## 2020-05-05 RX ORDER — SILDENAFIL 100 MG/1
TABLET, FILM COATED ORAL
Qty: 10 TABLET | Refills: 0 | Status: SHIPPED | OUTPATIENT
Start: 2020-05-05 | End: 2020-06-23

## 2020-05-05 RX ORDER — SILDENAFIL 100 MG/1
TABLET, FILM COATED ORAL
Qty: 10 TABLET | Refills: 0 | Status: SHIPPED | OUTPATIENT
Start: 2020-05-05 | End: 2020-09-25

## 2020-05-05 NOTE — TELEPHONE ENCOUNTER
[facility] Medication Refills:    Medication: Sildenafil    Dosage: 100mg    Number: 20    Refills: 2    Last Office Visit: 10/03/2019    Next Office Visit: 06/10/2020

## 2020-05-14 RX ORDER — AMLODIPINE BESYLATE 5 MG/1
TABLET ORAL
Qty: 30 TABLET | Refills: 2 | Status: SHIPPED | OUTPATIENT
Start: 2020-05-14 | End: 2020-08-12

## 2020-06-08 ENCOUNTER — TELEPHONE (OUTPATIENT)
Dept: CARDIOLOGY CLINIC | Age: 70
End: 2020-06-08

## 2020-06-08 NOTE — TELEPHONE ENCOUNTER
Pt called in stated Dixon Avni sent him to the lab in [facility] to get INR done. Pt stated the lab stated there is no order in for pt to get blood work done. Pt can be reached at [phone].  Thanks

## 2020-06-09 DIAGNOSIS — Z79.899 ENCOUNTER FOR LONG-TERM (CURRENT) USE OF OTHER MEDICATIONS: ICD-10-CM

## 2020-06-09 DIAGNOSIS — I48.21 PERMANENT ATRIAL FIBRILLATION (HCC): ICD-10-CM

## 2020-06-09 LAB
INR BLD: 2.2 (ref 0.86–1.14)
PROTHROMBIN TIME: 25.7 SEC (ref 10–13.2)

## 2020-06-10 ENCOUNTER — NURSE ONLY (OUTPATIENT)
Dept: CARDIOLOGY CLINIC | Age: 70
End: 2020-06-10
Payer: COMMERCIAL

## 2020-06-10 ENCOUNTER — TELEPHONE (OUTPATIENT)
Dept: CARDIOLOGY CLINIC | Age: 70
End: 2020-06-10

## 2020-06-10 ENCOUNTER — OFFICE VISIT (OUTPATIENT)
Dept: CARDIOLOGY CLINIC | Age: 70
End: 2020-06-10
Payer: MEDICARE

## 2020-06-10 ENCOUNTER — ANTI-COAG VISIT (OUTPATIENT)
Dept: CARDIOLOGY CLINIC | Age: 70
End: 2020-06-10

## 2020-06-10 VITALS
SYSTOLIC BLOOD PRESSURE: 114 MMHG | BODY MASS INDEX: 37.36 KG/M2 | DIASTOLIC BLOOD PRESSURE: 82 MMHG | HEART RATE: 66 BPM | WEIGHT: 291 LBS | TEMPERATURE: 97.1 F

## 2020-06-10 PROCEDURE — G8417 CALC BMI ABV UP PARAM F/U: HCPCS | Performed by: INTERNAL MEDICINE

## 2020-06-10 PROCEDURE — 1036F TOBACCO NON-USER: CPT | Performed by: INTERNAL MEDICINE

## 2020-06-10 PROCEDURE — 4040F PNEUMOC VAC/ADMIN/RCVD: CPT | Performed by: INTERNAL MEDICINE

## 2020-06-10 PROCEDURE — 93288 INTERROG EVL PM/LDLS PM IP: CPT | Performed by: INTERNAL MEDICINE

## 2020-06-10 PROCEDURE — 3017F COLORECTAL CA SCREEN DOC REV: CPT | Performed by: INTERNAL MEDICINE

## 2020-06-10 PROCEDURE — 99214 OFFICE O/P EST MOD 30 MIN: CPT | Performed by: INTERNAL MEDICINE

## 2020-06-10 PROCEDURE — G8427 DOCREV CUR MEDS BY ELIG CLIN: HCPCS | Performed by: INTERNAL MEDICINE

## 2020-06-10 PROCEDURE — 1123F ACP DISCUSS/DSCN MKR DOCD: CPT | Performed by: INTERNAL MEDICINE

## 2020-06-10 PROCEDURE — 93284 PRGRMG EVAL IMPLANTABLE DFB: CPT | Performed by: INTERNAL MEDICINE

## 2020-06-10 NOTE — PROGRESS NOTES
Device interrogation by company representative. See interrogation for further details. Device has reached ALMA as of 5/22/20. He has a [facility] crt-d. . BP 98%. Hx AF. AT/AF burden 36%. No VT/VF recorded. corvue trending down. He saw [employer] today @ [facility]. Message sent to staff to call pt and schedule ov w/ EP to discuss generator change. Pt has been noncompliant w/ remote monitoring. See PACEART report under Cardiology tab.

## 2020-06-10 NOTE — PROGRESS NOTES
[employer] Anaphylaxis    Penicillins      Current Outpatient Medications   Medication Sig Dispense Refill    magnesium oxide (MAG-OX) 400 (241.3 Mg) MG TABS tablet TAKE ONE TABLET BY MOUTH TWICE A DAY 60 tablet 0    amLODIPine (NORVASC) 5 MG tablet TAKE ONE TABLET BY MOUTH DAILY 30 tablet 2    sildenafil (VIAGRA) 100 MG tablet TAKE ONE TABLET BY MOUTH DAILY AS NEEDED FOR ERECTILE DYSFUNCTION 10 tablet 0    sildenafil (VIAGRA) 100 MG tablet TAKE ONE TABLET BY MOUTH DAILY AS NEEDED FOR ERECTILE DYSFUNCTION 10 tablet 0    warfarin (COUMADIN) 5 MG tablet TAKE TWO TABLETS BY MOUTH DAILY 60 tablet 2    aspirin 81 MG chewable tablet Take 1 tablet by mouth daily 90 tablet 5    amiodarone (PACERONE) 100 MG tablet TAKE ONE TABLET BY MOUTH DAILY 90 tablet 3    carvedilol (COREG) 25 MG tablet Take 1 tablet by mouth 2 times daily (with meals) 180 tablet 3    isosorbide mononitrate (IMDUR) 30 MG extended release tablet Take 1 tablet by mouth daily 90 tablet 3    sodium bicarbonate 650 MG tablet Take 1 tablet by mouth 2 times daily 60 tablet 0    nitroGLYCERIN (NITROSTAT) 0.4 MG SL tablet up to max of 3 total doses. If no relief after 1 dose, call 911. 25 tablet 0    warfarin (COUMADIN) 7.5 MG tablet Take 7.5 mg by mouth daily Saturday take 5 MG. Take 7.5mg on all other days.  allopurinol (ZYLOPRIM) 300 MG tablet Take 1 tablet by mouth daily 90 tablet 1     No current facility-administered medications for this visit. Physical Exam:   /82   Pulse 66   Temp 97.1 °F (36.2 °C)   Wt 291 lb (132 kg)   BMI 37.36 kg/m²   BP Readings from Last 3 Encounters:   06/10/20 114/82   10/03/19 (!) 138/100   05/22/19 112/80     Pulse Readings from Last 3 Encounters:   06/10/20 66   10/03/19 76   05/22/19 76     Wt Readings from Last 3 Encounters:   06/10/20 291 lb (132 kg)   10/03/19 283 lb (128.4 kg)   05/22/19 278 lb (126.1 kg)     Constitutional: He is oriented to person, place, and time.

## 2020-06-11 NOTE — TELEPHONE ENCOUNTER
I am currently working on scheduling pt for 6/16/2020 at 4:30 pm.  LVM for current 4:30 pt to r/s to 7/10/20. Waiting on return call for confirmation.

## 2020-06-16 ENCOUNTER — NURSE ONLY (OUTPATIENT)
Dept: CARDIOLOGY CLINIC | Age: 70
End: 2020-06-16
Payer: COMMERCIAL

## 2020-06-16 ENCOUNTER — OFFICE VISIT (OUTPATIENT)
Dept: CARDIOLOGY CLINIC | Age: 70
End: 2020-06-16
Payer: COMMERCIAL

## 2020-06-16 VITALS
BODY MASS INDEX: 37.65 KG/M2 | HEIGHT: 74 IN | TEMPERATURE: 98.2 F | HEART RATE: 78 BPM | DIASTOLIC BLOOD PRESSURE: 102 MMHG | SYSTOLIC BLOOD PRESSURE: 158 MMHG | WEIGHT: 293.4 LBS

## 2020-06-16 PROCEDURE — 1036F TOBACCO NON-USER: CPT | Performed by: INTERNAL MEDICINE

## 2020-06-16 PROCEDURE — 4040F PNEUMOC VAC/ADMIN/RCVD: CPT | Performed by: INTERNAL MEDICINE

## 2020-06-16 PROCEDURE — 93000 ELECTROCARDIOGRAM COMPLETE: CPT | Performed by: INTERNAL MEDICINE

## 2020-06-16 PROCEDURE — 1123F ACP DISCUSS/DSCN MKR DOCD: CPT | Performed by: INTERNAL MEDICINE

## 2020-06-16 PROCEDURE — G8427 DOCREV CUR MEDS BY ELIG CLIN: HCPCS | Performed by: INTERNAL MEDICINE

## 2020-06-16 PROCEDURE — 3017F COLORECTAL CA SCREEN DOC REV: CPT | Performed by: INTERNAL MEDICINE

## 2020-06-16 PROCEDURE — 99214 OFFICE O/P EST MOD 30 MIN: CPT | Performed by: INTERNAL MEDICINE

## 2020-06-16 PROCEDURE — 93284 PRGRMG EVAL IMPLANTABLE DFB: CPT | Performed by: INTERNAL MEDICINE

## 2020-06-16 PROCEDURE — G8417 CALC BMI ABV UP PARAM F/U: HCPCS | Performed by: INTERNAL MEDICINE

## 2020-06-16 RX ORDER — LEVOTHYROXINE SODIUM 0.05 MG/1
50 TABLET ORAL DAILY
COMMUNITY
Start: 2020-06-08

## 2020-06-16 NOTE — PROGRESS NOTES
[facility] [facility] [facility]   Cardiac [facility]ation    Referring Provider:  Jose Alfredo Falcon MD     Chief Concern: AF, CHF, CM, CKD. HPI:  Floyd Joseph is a 78 y.o. male with PMH of HTN, HLD, PAF on amiodarone, and NICMP. S/p SJM Bi-V ICD implant with a surgically implanted epicardial LV lead due to difficulty with insertion of LV lead in 12/2010. At the time of his ICD implant, his EF was 30%, but his last echo in 2016 showed that his EF improved to 50-55%. His device was replaced 10/12/15. Pt was admitted in 5/2020 for CP. Following abnormal Lexiscan, he underwent a LHC (5/10/20, Dr. Luis Carreon) that showed mild CAD, but not requiring intervention and EF of 30-35%. Currently on amiodarone 100 mg daily, Coreg 25 mg BID, baby ASA, and warfarin managed by Dr. Luis Carreon. No ACEi/ARB due to CKD. Pt is here today as his device is nearing ALMA. Past Medical History:   has a past medical history of Atrial fibrillation ([address] CHF (congestive heart failure) ([address] CKD (chronic kidney disease) stage 3, GFR 30-59 ml/min ([address] Fracture, Gout, Hypertension, and Pacemaker. Surgical History:   has a past surgical history that includes Cardiac catheterization (5-2004). Social History:   reports that he is a non-smoker but has been exposed to tobacco smoke. He has never used smokeless tobacco. He reports current alcohol use. He reports current drug use. Family History:  family history includes Heart Disease in his father and mother; High Blood Pressure in his brother, father, and mother.      Home Medications:  Outpatient Encounter Medications as of 6/16/2020   Medication Sig Dispense Refill    magnesium oxide (MAG-OX) 400 (241.3 Mg) MG TABS tablet TAKE ONE TABLET BY MOUTH TWICE A DAY 60 tablet 0    amLODIPine (NORVASC) 5 MG tablet TAKE ONE TABLET BY MOUTH DAILY 30 tablet 2    sildenafil (VIAGRA) 100 MG tablet TAKE ONE TABLET BY MOUTH DAILY AS NEEDED FOR ERECTILE DYSFUNCTION 10 tablet 0    sildenafil range of motion. He exhibits no edema. Neurological: He is alert and oriented to person, place, and time. Skin: Skin is warm and dry. Psychiatric: He has a normal mood and affect. EKG 6/16/20, Personally reviewed. V rate: 64, MO: 174, QRS: 122, QTc: 437. Echo (1/11/16)  Left ventricle size is normal with mild concentric left ventricular   hypertrophy.   Ejection fraction is estimated to be 50-55%.   Cannot rule out mild hypokinesis of the apical anterior wall.   Normal right ventricular size and function.   Pacer / ICD wire is visualized in the right ventricle.   Mild biatrial enlargement.   Sinus of valsalva appears to be mildly dilated.   Mild-moderate mitral regurgitation is present.   Mild pulmonic regurgitation present.  Kumar Benne is mild tricuspid regurgitation with RVSP estimated at 35 mmHg. Assessment:  1. Paroxysmal Atrial fibrillation - on Amiodarone 100 mg daily  Interrogation today shows Atrial fib burden of 25% (comparing 7% from last year). He is on warfarin which is managed by [facility], [facility]. coumadin clinic. He has no clear symptoms related to the AF. 2.  Bi-V ICD in situ: implanted in 12/2010 using a surgically implanted epicardial LV lead. 3.  Cardiomyopathy- nonischemic. EF improved to 50% post Bi-V ICD implant. No evidence of heart failure on exam.      Plan:  1. Continue amiodarone 100 mg daily for now d/t pt has no symptom.   2.  F/U EP in 1 year    Jazmyne Gallego M.D.

## 2020-06-16 NOTE — PROGRESS NOTES
(1.88 m)   Wt 293 lb 6.4 oz (133.1 kg)   BMI 37.67 kg/m²     Objective:  Physical Exam   Constitutional: He is oriented to person, place, and time. He appears well-developed and well-nourished. HENT:   Head: Normocephalic and atraumatic. Eyes: Pupils are equal, round, and reactive to light. Neck: Normal range of motion. Cardiovascular: Normal rate, regular rhythm and normal heart sounds. Pulmonary/Chest: Effort normal and breath sounds normal.   Abdominal: Soft. No tenderness. Musculoskeletal: Normal range of motion. He exhibits no edema. Neurological: He is alert and oriented to person, place, and time. Skin: Skin is warm and dry. Psychiatric: He has a normal mood and affect. EKG 6/16/20, Personally reviewed. Echo (1/11/16)  Left ventricle size is normal with mild concentric left ventricular   hypertrophy.   Ejection fraction is estimated to be 50-55%.   Cannot rule out mild hypokinesis of the apical anterior wall.   Normal right ventricular size and function.   Pacer / ICD wire is visualized in the right ventricle.   Mild biatrial enlargement.   Sinus of valsalva appears to be mildly dilated.   Mild-moderate mitral regurgitation is present.   Mild pulmonic regurgitation present.  Sandra Chasten is mild tricuspid regurgitation with RVSP estimated at 35 mmHg. Assessment:  1. Paroxysmal Atrial fibrillation - on Amiodarone 100 mg daily  Interrogation today shows Atrial fib burden of 48% (comparing 25% from last interogation). He is on warfarin which is managed by [facility], [facility]. coumadin clinic. He has no clear symptoms related to the AF. 2.  Bi-V ICD in situ: implanted in 12/2010 using a surgically implanted epicardial LV lead. Gen change scheduled for 7/7/20. His device longevity is adversely affected by the elevated LV pacing threshold. ECG today demonstrates wide LBBB without Bi-V pacing.  Though it will necessitate more frequent device replacements, adhering to Bi-V pacing appears to be the best therapeutic option. 3.  Cardiomyopathy- nonischemic. EF improved to 50% post Bi-V ICD implant. No evidence of heart failure on exam.      Plan:  1. Plan for gen change in July  2. Continue meds as ordered. 3. Continue activity as tolerated  4. Follow up with FW   and device check after gen change. Harjit Jin RN, am scribing for and in the presence of Dr. Shoaib Grant. 06/16/20   4:39 PM  Vish Hawkins RN    I, Dr. Shoaib Grant, personally performed the services described in this documentation as scribed by Vish Hawkins RN in my presence, and it is both accurate and complete.       Shoaib Grant M.D.

## 2020-06-17 ENCOUNTER — PREP FOR PROCEDURE (OUTPATIENT)
Dept: CARDIOLOGY CLINIC | Age: 70
End: 2020-06-17

## 2020-06-17 RX ORDER — VANCOMYCIN HYDROCHLORIDE 500 MG/10ML
1 INJECTION, POWDER, LYOPHILIZED, FOR SOLUTION INTRAVENOUS ONCE
Status: CANCELLED | OUTPATIENT
Start: 2020-06-29

## 2020-06-17 RX ORDER — SODIUM CHLORIDE 0.9 % (FLUSH) 0.9 %
10 SYRINGE (ML) INJECTION EVERY 12 HOURS SCHEDULED
Status: CANCELLED | OUTPATIENT
Start: 2020-06-17

## 2020-06-17 RX ORDER — SODIUM CHLORIDE 0.9 % (FLUSH) 0.9 %
10 SYRINGE (ML) INJECTION PRN
Status: CANCELLED | OUTPATIENT
Start: 2020-06-17

## 2020-06-17 RX ORDER — SODIUM CHLORIDE 9 MG/ML
INJECTION, SOLUTION INTRAVENOUS CONTINUOUS
Status: CANCELLED | OUTPATIENT
Start: 2020-06-17

## 2020-06-23 ENCOUNTER — TELEPHONE (OUTPATIENT)
Dept: CARDIOLOGY CLINIC | Age: 70
End: 2020-06-23

## 2020-06-23 RX ORDER — SILDENAFIL 100 MG/1
TABLET, FILM COATED ORAL
Qty: 10 TABLET | Refills: 3 | Status: SHIPPED | OUTPATIENT
Start: 2020-06-23 | End: 2020-11-06

## 2020-06-23 NOTE — TELEPHONE ENCOUNTER
Per Misti's request set up appointment with patient to discuss a-fib per Dr. Patel Ahuja. Unable to reach by phone, voicemail full.  Sent patient e-mail to confirm appt 6/29/20 @ 1:30 PM

## 2020-06-30 ENCOUNTER — TELEPHONE (OUTPATIENT)
Dept: CARDIOLOGY CLINIC | Age: 70
End: 2020-06-30

## 2020-07-01 ENCOUNTER — OFFICE VISIT (OUTPATIENT)
Dept: PRIMARY CARE CLINIC | Age: 70
End: 2020-07-01
Payer: MEDICARE

## 2020-07-01 PROCEDURE — 99211 OFF/OP EST MAY X REQ PHY/QHP: CPT | Performed by: NURSE PRACTITIONER

## 2020-07-03 LAB
REPORT: NORMAL
SARS-COV-2: NOT DETECTED
THIS TEST SENT TO: NORMAL

## 2020-07-07 ENCOUNTER — HOSPITAL ENCOUNTER (OUTPATIENT)
Dept: CARDIAC CATH/INVASIVE PROCEDURES | Age: 70
Discharge: HOME | End: 2020-07-09
Payer: MEDICARE

## 2020-07-07 ENCOUNTER — PROCEDURE VISIT (OUTPATIENT)
Dept: CARDIOLOGY CLINIC | Age: 70
End: 2020-07-07
Payer: MEDICARE

## 2020-07-07 VITALS — HEIGHT: 74 IN | BODY MASS INDEX: 35.94 KG/M2 | WEIGHT: 280 LBS | TEMPERATURE: 98.1 F

## 2020-07-07 LAB
ANION GAP SERPL CALCULATED.3IONS-SCNC: 8 MMOL/L (ref 3–16)
BUN BLDV-MCNC: 56 MG/DL (ref 7–20)
CALCIUM SERPL-MCNC: 9.2 MG/DL (ref 8.3–10.6)
CHLORIDE BLD-SCNC: 110 MMOL/L (ref 99–110)
CO2: 22 MMOL/L (ref 21–32)
CREAT SERPL-MCNC: 3.6 MG/DL (ref 0.8–1.3)
EKG ATRIAL RATE: 288 BPM
EKG DIAGNOSIS: NORMAL
EKG Q-T INTERVAL: 472 MS
EKG QRS DURATION: 164 MS
EKG QTC CALCULATION (BAZETT): 548 MS
EKG R AXIS: -75 DEGREES
EKG T AXIS: 90 DEGREES
EKG VENTRICULAR RATE: 81 BPM
GFR AFRICAN AMERICAN: 20
GFR NON-AFRICAN AMERICAN: 17
GLUCOSE BLD-MCNC: 88 MG/DL (ref 70–99)
HCT VFR BLD CALC: 38.9 % (ref 40.5–52.5)
HEMOGLOBIN: 12.6 G/DL (ref 13.5–17.5)
INR BLD: 1.54 (ref 0.86–1.14)
MCH RBC QN AUTO: 32.1 PG (ref 26–34)
MCHC RBC AUTO-ENTMCNC: 32.4 G/DL (ref 31–36)
MCV RBC AUTO: 99.2 FL (ref 80–100)
PDW BLD-RTO: 15.7 % (ref 12.4–15.4)
PLATELET # BLD: 135 K/UL (ref 135–450)
PMV BLD AUTO: 9.1 FL (ref 5–10.5)
POTASSIUM SERPL-SCNC: 5 MMOL/L (ref 3.5–5.1)
PROTHROMBIN TIME: 18 SEC (ref 10–13.2)
RBC # BLD: 3.92 M/UL (ref 4.2–5.9)
SODIUM BLD-SCNC: 140 MMOL/L (ref 136–145)
WBC # BLD: 5.3 K/UL (ref 4–11)

## 2020-07-07 PROCEDURE — C1882 AICD, OTHER THAN SING/DUAL: HCPCS

## 2020-07-07 PROCEDURE — 33264 RMVL & RPLCMT DFB GEN MLT LD: CPT | Performed by: INTERNAL MEDICINE

## 2020-07-07 PROCEDURE — 99153 MOD SED SAME PHYS/QHP EA: CPT

## 2020-07-07 PROCEDURE — 93005 ELECTROCARDIOGRAM TRACING: CPT | Performed by: INTERNAL MEDICINE

## 2020-07-07 PROCEDURE — 99152 MOD SED SAME PHYS/QHP 5/>YRS: CPT

## 2020-07-07 PROCEDURE — 33264 RMVL & RPLCMT DFB GEN MLT LD: CPT

## 2020-07-07 PROCEDURE — 80048 BASIC METABOLIC PNL TOTAL CA: CPT

## 2020-07-07 PROCEDURE — 85027 COMPLETE CBC AUTOMATED: CPT

## 2020-07-07 PROCEDURE — 85610 PROTHROMBIN TIME: CPT

## 2020-07-07 PROCEDURE — 93010 ELECTROCARDIOGRAM REPORT: CPT | Performed by: INTERNAL MEDICINE

## 2020-07-07 PROCEDURE — 2780000010 HC IMPLANT OTHER

## 2020-07-07 PROCEDURE — 93284 PRGRMG EVAL IMPLANTABLE DFB: CPT | Performed by: INTERNAL MEDICINE

## 2020-07-07 RX ORDER — VANCOMYCIN HYDROCHLORIDE 500 MG/10ML
1 INJECTION, POWDER, LYOPHILIZED, FOR SOLUTION INTRAVENOUS ONCE
Status: DISCONTINUED | OUTPATIENT
Start: 2020-07-07 | End: 2020-07-07 | Stop reason: ALTCHOICE

## 2020-07-07 RX ORDER — SODIUM CHLORIDE 9 MG/ML
INJECTION, SOLUTION INTRAVENOUS CONTINUOUS
Status: DISCONTINUED | OUTPATIENT
Start: 2020-07-07 | End: 2020-07-10 | Stop reason: HOSPADM

## 2020-07-07 RX ORDER — SODIUM CHLORIDE 0.9 % (FLUSH) 0.9 %
10 SYRINGE (ML) INJECTION EVERY 12 HOURS SCHEDULED
Status: DISCONTINUED | OUTPATIENT
Start: 2020-07-07 | End: 2020-07-10 | Stop reason: HOSPADM

## 2020-07-07 RX ORDER — SODIUM CHLORIDE 0.9 % (FLUSH) 0.9 %
10 SYRINGE (ML) INJECTION PRN
Status: DISCONTINUED | OUTPATIENT
Start: 2020-07-07 | End: 2020-07-10 | Stop reason: HOSPADM

## 2020-07-07 NOTE — PROCEDURES
[address]               [address] [facility], [address]                            CARDIAC CATHETERIZATION    PATIENT NAME: Brandi Oleary                     :        1950  MED REC NO:   [id number]                          ROOM:  ACCOUNT NO:   [de-identified]                           ADMIT DATE: 2020  PROVIDER:     Yojana Scott MD    DATE OF PROCEDURE:  2020    CARDIAC ELECTROPHYSIOLOGY PROCEDURE NOTE    PROCEDURES:  1. Removal of biventricular ICD generator. 2.  Implantation of biventricular ICD generator. INDICATIONS:  Cardiomyopathy, left bundle branch block, biventricular  ICD at elective replacement time. FINDINGS:  1. Right atrial sensing 1.4 millivolts in atrial fibrillation. 2.  Right ventricular sensing 7.2 millivolts. 3.  Right ventricular pacing threshold 0.8 volts at 0.5 milliseconds. 4.  Left ventricular pacing threshold 3.5 volts at 1 millisecond in tip  to coil configuration. PROCEDURE DESCRIPTION:  After informed consent was obtained, the patient  was brought to the cardiac electrophysiology laboratory in a fasting  state on 2020. He was prepared for the procedure by application  of ECG electrodes, and an automated blood pressure cuff. Pacing and  defibrillation patches were applied to the chest in the  anterior-posterior orientation. The left upper chest was shaved and  prepared with antibacterial soap and the patient was draped in sterile  fashion. He received IV midazolam and IV fentanyl for sedation. After  adequate sedation was achieved, local anesthetic was infiltrated along  the previous incision line and previous incision was opened with sharp  dissection. This incision was carried down to the level of the ICD  capsule. The capsule was incised and preexisting ICD and leads were  dissected from surrounding connective tissues.   The leads and device  were then delivered from the pocket. The preexisting generator was Stubben 149 [id number], serial [id number], this was removed from the leads,  removed from the field. Preexisting right atrial lead was [employer]  model 2088, serial [id number]. The atrial activity was sensed to 1.4  millivolts in atrial fibrillation. Lead impedance was 330 ohms. The  right ventricular lead is Stubben 149 [id number], serial [id number]. The  R-wave was sensed to 7.2 millivolts. The ventricular pacing threshold  is 0.8 volts at 0.5 milliseconds. Pacing impedance was 390 ohms. The  left ventricular lead is model [id number], serial [id number]. This is an  epicardial lead. The pacing threshold is 3.5 volts at 1 millisecond. Pacing impedance was 400 _____. All three leads were felt to be  acceptable for chronic use. The pocket was irrigated copiously with a  solution of antibiotic. Hemostasis was checked and found to be  adequate. The leads were connected to the new generator. The new  generator is Stubben 149 [id number], serial [id number]. This was connected  to the right atrial, right ventricular, and left ventricular leads. The  device was placed in an antimicrobial pouch and returned to the  subcutaneous pocket. The subcutaneous tissues were closed in  interrupted fashion using 3-0 Vicryl sutures. Skin was closed in  subcuticular fashion using 4-0 Vicryl sutures. Wound edges were  approximated with Steri-Strips and the wound was covered with a dry  sterile dressing. The patient tolerated the procedure well. There were no apparent  complications. All sponge and needle counts were reported as correct at  the termination of the case.         Zaki Brush MD    D: 07/07/2020 9:57:41       T: 07/07/2020 10:47:08     [email or web address]  Job#: [id number]     Doc#: [id number]    CC:

## 2020-07-07 NOTE — H&P
BY MOUTH TWICE A DAY 60 tablet 0    amLODIPine (NORVASC) 5 MG tablet TAKE ONE TABLET BY MOUTH DAILY 30 tablet 2    sildenafil (VIAGRA) 100 MG tablet TAKE ONE TABLET BY MOUTH DAILY AS NEEDED FOR ERECTILE DYSFUNCTION 10 tablet 0    sildenafil (VIAGRA) 100 MG tablet TAKE ONE TABLET BY MOUTH DAILY AS NEEDED FOR ERECTILE DYSFUNCTION 10 tablet 0    warfarin (COUMADIN) 5 MG tablet TAKE TWO TABLETS BY MOUTH DAILY 60 tablet 2    aspirin 81 MG chewable tablet Take 1 tablet by mouth daily 90 tablet 5    amiodarone (PACERONE) 100 MG tablet TAKE ONE TABLET BY MOUTH DAILY 90 tablet 3    carvedilol (COREG) 25 MG tablet Take 1 tablet by mouth 2 times daily (with meals) 180 tablet 3    isosorbide mononitrate (IMDUR) 30 MG extended release tablet Take 1 tablet by mouth daily 90 tablet 3    sodium bicarbonate 650 MG tablet Take 1 tablet by mouth 2 times daily 60 tablet 0    nitroGLYCERIN (NITROSTAT) 0.4 MG SL tablet up to max of 3 total doses. If no relief after 1 dose, call 911. 25 tablet 0    warfarin (COUMADIN) 7.5 MG tablet Take 7.5 mg by mouth daily Saturday take 5 MG. Take 7.5mg on all other days.        allopurinol (ZYLOPRIM) 300 MG tablet Take 1 tablet by mouth daily 90 tablet 1    levothyroxine (SYNTHROID) 50 MCG tablet Take 50 mcg by mouth daily          No facility-administered encounter medications on file as of 6/16/2020.             Allergies:  Peanut-containing drug products and Penicillins      Review of Systems   Constitutional: Negative. HENT: Negative. Eyes: Negative. Respiratory: Negative. Cardiovascular: Negative. Gastrointestinal: Negative. Genitourinary: Negative. Musculoskeletal: Negative. Skin: Negative. Neurological: Negative. Hematological: Negative.     Psychiatric/Behavioral: Negative.     BP (!) 158/102 (Site: Left Upper Arm, Position: Supine, Cuff Size: Large Adult)   Pulse 78   Temp 98.2 °F (36.8 °C)   Ht 6' 2\" (1.88 m)   Wt 293 lb 6.4 oz (133.1 kg)   BMI 37.67 kg/m²      Objective:  Physical Exam   Constitutional: He is oriented to person, place, and time. He appears well-developed and well-nourished. HENT:   Head: Normocephalic and atraumatic. Eyes: Pupils are equal, round, and reactive to light. Neck: Normal range of motion. Cardiovascular: Normal rate, regular rhythm and normal heart sounds. Pulmonary/Chest: Effort normal and breath sounds normal.   Abdominal: Soft. No tenderness. Musculoskeletal: Normal range of motion. He exhibits no edema. Neurological: He is alert and oriented to person, place, and time. Skin: Skin is warm and dry. Psychiatric: He has a normal mood and affect.      EKG 6/16/20, Personally reviewed.      Echo (1/11/16)  Left ventricle size is normal with mild concentric left ventricular   hypertrophy.   Ejection fraction is estimated to be 50-55%.   Cannot rule out mild hypokinesis of the apical anterior wall.   Normal right ventricular size and function.   Pacer / ICD wire is visualized in the right ventricle.   Mild biatrial enlargement.   Sinus of valsalva appears to be mildly dilated.   Mild-moderate mitral regurgitation is present.   Mild pulmonic regurgitation present.  Iman Carota is mild tricuspid regurgitation with RVSP estimated at 35 mmHg.     Assessment:  1. Paroxysmal Atrial fibrillation - on Amiodarone 100 mg daily  Interrogation today shows Atrial fib burden of 48% (comparing 25% from last interogation). He is on warfarin which is managed by [facility] coumadin clinic. He has no clear symptoms related to the AF. 2.  Bi-V ICD in situ: implanted in 12/2010 using a surgically implanted epicardial LV lead. Gen change scheduled for 7/7/20. His device longevity is adversely affected by the elevated LV pacing threshold. ECG today demonstrates wide LBBB without Bi-V pacing. Though it will necessitate more frequent device replacements, adhering to Bi-V pacing appears to be the best therapeutic option.   3.

## 2020-07-14 ENCOUNTER — NURSE ONLY (OUTPATIENT)
Dept: CARDIOLOGY CLINIC | Age: 70
End: 2020-07-14
Payer: MEDICARE

## 2020-07-14 PROCEDURE — 93284 PRGRMG EVAL IMPLANTABLE DFB: CPT | Performed by: INTERNAL MEDICINE

## 2020-07-14 NOTE — PROGRESS NOTES
Wound check: steri strips removed. Site clean, dry, and intact. Incision healing well. Went over incision instructions.

## 2020-08-02 NOTE — PROGRESS NOTES
[facility]   Electrophysiology  Office Visit  Date: 8/5/2020    Chief Complaint   Patient presents with    Cardiomyopathy    Congestive Heart Failure    Atrial Fibrillation    Hypertension       Cardiac HX: Tyrel Peña is a 78 y.o. man with a h/o HTN, HLD, pAF on amio, NICMP, EF as low as 30% (2010), 50-55% (2016), s/p LHC 5/10/20 with an EF 30-35%, s/p SJM CRT-D with a surgically implanted LV lead, s/p gen change 10/12/2015, s/p gen change 7/7/20, on amiodarone (2013). Interval History/HPI:Patient is here for a 1 month f/u after CDT-D gen change. L chest incision well healed. Device check shows 54% AP, >99% , 42% AT/AF burden with longest & most recent episode on 8/1/20 lasting 2 days and 19 hrs, 1 episode of NSVT lasting 6 beats on 8/3/20, all other parameters stable, estimated longevity is 4 years. AT/AF burden noted to have steadily increased over the past few months, burden 11% in 3/20 and 30% in 6/20, now 42%. Pt is historically asymptomatic w/ AF, he denies palpitations, dizziness, or lightheadedness. Weight stable, no edema noted, he denies CP, SOB, orthopnea or PND. Pt states he feels pretty good, he stays active watching his two 4 y.o grandchildren during the day. BP is on the higher side.      Home medications:   Current Outpatient Medications on File Prior to Visit   Medication Sig Dispense Refill    magnesium oxide (MAG-OX) 400 (241.3 Mg) MG TABS tablet TAKE ONE TABLET BY MOUTH TWICE A DAY 60 tablet 0    sildenafil (VIAGRA) 100 MG tablet TAKE ONE TABLET BY MOUTH DAILY AS NEEDED FOR ERECTILE DYSFUNCTION 10 tablet 3    levothyroxine (SYNTHROID) 50 MCG tablet Take 50 mcg by mouth daily      amLODIPine (NORVASC) 5 MG tablet TAKE ONE TABLET BY MOUTH DAILY 30 tablet 2    sildenafil (VIAGRA) 100 MG tablet TAKE ONE TABLET BY MOUTH DAILY AS NEEDED FOR ERECTILE DYSFUNCTION 10 tablet 0    warfarin (COUMADIN) 5 MG tablet TAKE TWO TABLETS BY MOUTH DAILY 60 tablet 2    aspirin 81 MG chewable tablet Take 1 tablet by mouth daily 90 tablet 5    carvedilol (COREG) 25 MG tablet Take 1 tablet by mouth 2 times daily (with meals) 180 tablet 3    isosorbide mononitrate (IMDUR) 30 MG extended release tablet Take 1 tablet by mouth daily 90 tablet 3    sodium bicarbonate 650 MG tablet Take 1 tablet by mouth 2 times daily 60 tablet 0    nitroGLYCERIN (NITROSTAT) 0.4 MG SL tablet up to max of 3 total doses. If no relief after 1 dose, call 911. 25 tablet 0    warfarin (COUMADIN) 7.5 MG tablet Take 7.5 mg by mouth daily Saturday take 5 MG. Take 7.5mg on all other days.  allopurinol (ZYLOPRIM) 300 MG tablet Take 1 tablet by mouth daily 90 tablet 1     No current facility-administered medications on file prior to visit. Past Medical History:   Diagnosis Date    Atrial fibrillation ([facility] [address]     CH[facility]est[facility]     Dr. Rausch(cardiologist)    CKD (chronic kidney disease) stage 3, GFR 30-59 ml/min ([facility])     Fracture     R index finger and L ankle    Gout 2000    Hnands/feet/elbows    Hypertension     Pacemaker         Past Surgical History:   Procedure Laterality Date    CARDIAC CATHETERIZATION  5-2004       Allergies   Allergen Reactions    Peanut-Containing Drug Products Anaphylaxis    Penicillins        Social History:  Reviewed. reports that he is a non-smoker but has been exposed to tobacco smoke. He has never used smokeless tobacco. He reports current alcohol use. He reports current drug use. Family History:  Reviewed. family history includes Heart Disease in his father and mother; High Blood Pressure in his brother, father, and mother. Review of System:    · Constitutional: No fevers, chills. · Eyes: No visual changes or diplopia. No scleral icterus. · ENT: No Headaches. No mouth sores or sore throat. · Cardiovascular: No chest pain,dyspnea on exertion, palpitations or loss of consciousness.  No cough, hemoptysis, pleuritic pain, or phlebitis. · Respiratory: No cough or wheezing. No hematemesis. · Gastrointestinal: No abdominal pain, blood in stools. · Genitourinary: No dysuria, or hematuria. · Musculoskeletal: No gait disturbance,    · Integumentary: No rash or pruritis. · Neurological: No headache, change in muscle strength, numbness or tingling. · Psychiatric: No anxiety, or depression. · Endocrine: No temperature intolerance. No excessive thirst, fluid intake, or urination. · Hem/Lymph: No abnormal bruising or bleeding, blood clots or swollen lymph nodes. · Allergic/Immunologic: No nasal congestion or hives. Physical Examination:  Vitals:    08/05/20 1347   BP: 132/82   Pulse: 60   Temp: 96.9 °F (36.1 °C)         Wt Readings from Last 3 Encounters:   08/05/20 295 lb (133.8 kg)   07/07/20 280 lb (127 kg)   06/16/20 293 lb 6.4 oz (133.1 kg)       · Constitutional: Oriented. No distress. · Head: Normocephalic and atraumatic. · Mouth/Throat: Oropharynx is clear and moist.   · Eyes: Conjunctivae clear without jaunduice. PERRL. · Neck: Neck supple. No rigidity. No JVD present. · Cardiovascular: Normal rate, regular rhythm, S1&S2. · Pulmonary/Chest: Bilateral respiratory sounds. No wheezes, No rhonchi. L chest incision well healed, no hematoma or ecchymosis. · Abdominal: Soft. Bowel sounds present. No distension, No tenderness. · Musculoskeletal: No tenderness. No edema    · Lymphadenopathy: Has no cervical adenopathy. · Neurological: Alert and oriented. Cranial nerve appears intact, No Gross deficit   · Skin: Skin is warm and dry. No rash noted. · Psychiatric: Has a normal mood, affect and behavior     Labs:  Reviewed. No results for input(s): NA, K, CL, CO2, PHOS, BUN, CREATININE in the last 72 hours. Invalid input(s): CA,  TSH  No results for input(s): WBC, HGB, HCT, MCV, PLT in the last 72 hours.   Lab Results   Component Value Date    TROPONINI 0.01 05/10/2019     No results found for: BNP  Lab Results Component Value Date    PROTIME 18.0 07/07/2020    PROTIME 25.7 06/09/2020    PROTIME 28.8 03/11/2020    INR 1.54 07/07/2020    INR 2.20 06/09/2020    INR 2.46 03/11/2020     Lab Results   Component Value Date    CHOL 150 05/09/2019    HDL 55 05/09/2019    TRIG 70 05/09/2019       ECG: Personally reviewed: BiV Paced Rhythm, HR 60, , , QTc 526    ECHO:  2016  Summary   Left ventricle size is normal with mild concentric left ventricular   hypertrophy. Ejection fraction is estimated to be 50-55%. Cannot rule out mild hypokinesis of the apical anterior wall. Normal right ventricular size and function. Pacer / ICD wire is visualized in the right ventricle. Mild biatrial enlargement. Sinus of valsalva appears to be mildly dilated. Mild-moderate mitral regurgitation is present. Mild pulmonic regurgitation present. There is mild tricuspid regurgitation with RVSP estimated at 35 mmHg. Stress Test: 5/9/2019  Summary     There is anterior and lateral reversible ischemia     The LVEF is 56% with normal LV wall motion. Cardiac Angiography:5/2019  Hemodynamics:   Left Ventricular Pressure: 8  Central Aortic Pressure: 156     Assessment: Mild coronary artery disease but does not require intervention. Cardiomyopathy ischemic appearing with regional wall motion in the anterior wall and apex akinesia and reduced ejection fraction     Recommendations: Optimize medical care continue dual antiplatelet therapy. The patient is currently on heparin and we will look to see if there is pulmonary emboli. A d-dimer is pending. We will hydrate the patient and given that he has renal failure and creatinine was 2.4.     Problem List:   Patient Active Problem List    Diagnosis Date Noted    Cardiomyopathy [facility]) 10/19/2015     Priority: High    A-fib ([address] 03/11/2015     Priority: High    Hypertension 03/11/2015     Priority: High    Presence of cardiac resynchronization therapy defibrillator (CRT-D) 03/11/2015     Priority: High    H/O angiography 05/22/2019    Exertional chest pain 05/08/2019    BELL (acute kidney injury) ([facility] [address] 05/08/2019    Hyperkalemia 05/08/2019    Monoclonal gammopathy 09/07/2016    Long term current use of anticoagulant 05/03/2016    CKD (chronic kidney disease) 02/03/2016    Gout 06/15/2010        Assessment:   1. Permanent atrial fibrillation    2. Nonischemic cardiomyopathy ([facility] [address]    3. SOB (shortness of breath)    4. Paroxysmal atrial fibrillation (HCC)    5. Atrial fibrillation, unspecified type ([facility] [address]    6. Essential hypertension        Cardiac HX: Candy Schneider is a 78 y.o. man with a h/o HTN, HLD, pAF on amio, NICMP, EF as low as 30% (2010), 50-55% (2016), s/p C 5/10/20 with an EF 30-35%, s/p SJM CRT-D with a surgically implanted LV lead, s/p gen change 10/12/2015, s/p gen change 7/7/20, on amiodarone since before 2013. [id number] 3 . TSH 2.21 (6/30/20). NICMP/ICMP  - EF 30-35 % s/p [facility] 5/2020  - Will recheck Echo  - NYHA class I/II  -   - On Carvedilol 25 mg BID, Imdur 30, dd hydralazine at next office visit  - S/p SJM CRT- D (2010), gen change (2015, 2020)  - Device check shows 54% AP, >99% , 42% AT/AF burden with longest & most recent episode on 8/1/2020 lasting 2 days and 19 hrs, 1 episode of NSVT lasting 6 beats, all other parameters stable. Longevity estimated 4 years.     - F/u with Dr. Luiz Andrade in 2-3 months  - F/u with Emmanuel Olmos CNP in 6 months  - ECG ordered and results personally reviewed     pAF  - In BiV paced rhythmn  - Device check shows AT/AF burden of 42%, noted to be 11% in 3/20 and 30% in 6/20, now 42%  - On amiodarone 100 mg (since before 2013) - will increase to 200 mg QD (reviewed with Dr. Luiz Andrade)  - Last LFT stable, TSH 2.21 (6/2020)  - Will check PFT's  - On warfarin - last INR 1.54 - managed by our office  - Will check INR on Monday s/p increased Amio dose  - Referral to Sleep Medicine for SULMA    EF of 30-35%  No ACE/ARBi for systolic HF d/t CKD  ASA and Statin for CAD  Anticoagulation for AF and heart failure  No tobacco use. All questions and concerns were addressed to the patient/family. Alternatives to my treatment were discussed. The note was completed using EMR. Every effort was made to ensure accuracy; however, inadvertent computerized transcription errors may be present. Patient received education regarding their diagnosis, treatment and medications while in the office today.       [employer] [address]

## 2020-08-05 ENCOUNTER — NURSE ONLY (OUTPATIENT)
Dept: CARDIOLOGY CLINIC | Age: 70
End: 2020-08-05
Payer: MEDICARE

## 2020-08-05 ENCOUNTER — OFFICE VISIT (OUTPATIENT)
Dept: CARDIOLOGY CLINIC | Age: 70
End: 2020-08-05
Payer: MEDICARE

## 2020-08-05 VITALS
DIASTOLIC BLOOD PRESSURE: 82 MMHG | HEART RATE: 60 BPM | SYSTOLIC BLOOD PRESSURE: 132 MMHG | TEMPERATURE: 96.9 F | WEIGHT: 295 LBS | HEIGHT: 74 IN | BODY MASS INDEX: 37.86 KG/M2

## 2020-08-05 PROCEDURE — 93284 PRGRMG EVAL IMPLANTABLE DFB: CPT | Performed by: INTERNAL MEDICINE

## 2020-08-05 PROCEDURE — 99214 OFFICE O/P EST MOD 30 MIN: CPT | Performed by: NURSE PRACTITIONER

## 2020-08-05 PROCEDURE — 93000 ELECTROCARDIOGRAM COMPLETE: CPT | Performed by: NURSE PRACTITIONER

## 2020-08-05 RX ORDER — AMIODARONE HYDROCHLORIDE 200 MG/1
TABLET ORAL
Qty: 90 TABLET | Refills: 3 | Status: SHIPPED | OUTPATIENT
Start: 2020-08-05 | End: 2020-09-25

## 2020-08-06 RX ORDER — AMIODARONE HYDROCHLORIDE 100 MG/1
TABLET ORAL
Qty: 90 TABLET | Refills: 2 | Status: SHIPPED | OUTPATIENT
Start: 2020-08-06 | End: 2020-10-23 | Stop reason: SDUPTHER

## 2020-08-12 RX ORDER — CARVEDILOL 25 MG/1
TABLET ORAL
Qty: 180 TABLET | Refills: 3 | Status: SHIPPED | OUTPATIENT
Start: 2020-08-12 | End: 2021-10-26 | Stop reason: SDUPTHER

## 2020-08-12 RX ORDER — AMLODIPINE BESYLATE 5 MG/1
TABLET ORAL
Qty: 90 TABLET | Refills: 3 | Status: SHIPPED | OUTPATIENT
Start: 2020-08-12 | End: 2021-08-09

## 2020-08-12 RX ORDER — ISOSORBIDE MONONITRATE 30 MG/1
TABLET, EXTENDED RELEASE ORAL
Qty: 90 TABLET | Refills: 3 | Status: SHIPPED | OUTPATIENT
Start: 2020-08-12 | End: 2021-08-09

## 2020-08-13 ENCOUNTER — OFFICE VISIT (OUTPATIENT)
Dept: PRIMARY CARE CLINIC | Age: 70
End: 2020-08-13
Payer: MEDICARE

## 2020-08-13 PROCEDURE — 99211 OFF/OP EST MAY X REQ PHY/QHP: CPT | Performed by: NURSE PRACTITIONER

## 2020-08-15 LAB
SARS-COV-2: NOT DETECTED
SOURCE: NORMAL

## 2020-08-19 ENCOUNTER — HOSPITAL ENCOUNTER (OUTPATIENT)
Dept: PULMONOLOGY | Age: 70
Discharge: HOME | End: 2020-08-19
Payer: MEDICARE

## 2020-08-19 ENCOUNTER — HOSPITAL ENCOUNTER (OUTPATIENT)
Dept: NON INVASIVE DIAGNOSTICS | Age: 70
Discharge: HOME | End: 2020-08-19
Payer: MEDICARE

## 2020-08-19 LAB
LV EF: 43 %
LVEF MODALITY: NORMAL

## 2020-08-19 PROCEDURE — 93356 MYOCRD STRAIN IMG SPCKL TRCK: CPT

## 2020-08-19 PROCEDURE — 94664 DEMO&/EVAL PT USE INHALER: CPT

## 2020-08-19 PROCEDURE — 93306 TTE W/DOPPLER COMPLETE: CPT

## 2020-08-19 PROCEDURE — 94060 EVALUATION OF WHEEZING: CPT

## 2020-08-19 PROCEDURE — 94761 N-INVAS EAR/PLS OXIMETRY MLT: CPT

## 2020-08-19 PROCEDURE — 94729 DIFFUSING CAPACITY: CPT

## 2020-08-19 PROCEDURE — 6360000002 HC RX W HCPCS: Performed by: NURSE PRACTITIONER

## 2020-08-19 PROCEDURE — 94726 PLETHYSMOGRAPHY LUNG VOLUMES: CPT

## 2020-08-19 RX ORDER — ALBUTEROL SULFATE 2.5 MG/3ML
2.5 SOLUTION RESPIRATORY (INHALATION) ONCE
Status: COMPLETED | OUTPATIENT
Start: 2020-08-19 | End: 2020-08-19

## 2020-08-19 RX ADMIN — ALBUTEROL SULFATE 2.5 MG: 2.5 SOLUTION RESPIRATORY (INHALATION) at 12:41

## 2020-08-21 NOTE — TELEPHONE ENCOUNTER
Requested Prescriptions     Pending Prescriptions Disp Refills    magnesium oxide (MAG-OX) 400 (241.3 Mg) MG TABS tablet [Pharmacy Med Name: MAGNESIUM OXIDE 400 MG TABLET] 60 tablet 5     Sig: TAKE ONE TABLET BY MOUTH TWICE A DAY          Number: 60    Refills: 5    Last Office Visit: 6/10/2020     Next Office Visit: 02/11/2020

## 2020-08-24 RX ORDER — HYDRALAZINE HYDROCHLORIDE 10 MG/1
10 TABLET, FILM COATED ORAL 3 TIMES DAILY
Qty: 90 TABLET | Refills: 3 | Status: SHIPPED | OUTPATIENT
Start: 2020-08-24 | End: 2021-01-04 | Stop reason: SDUPTHER

## 2020-08-25 ENCOUNTER — TELEPHONE (OUTPATIENT)
Dept: CARDIOLOGY CLINIC | Age: 70
End: 2020-08-25

## 2020-08-25 NOTE — TELEPHONE ENCOUNTER
----- Message from JOSE Merino CNP sent at 8/24/2020  5:16 PM EDT -----  EF is still low at 40-45%. I would like to add hydrallzine 10 mg TID to what he is currently taking for his low heart function. We will try to up titrate this medication and possibly get him off of the amlodipine. I have ordered the medication - please advise patient.

## 2020-08-26 ENCOUNTER — TELEPHONE (OUTPATIENT)
Dept: CARDIOLOGY CLINIC | Age: 70
End: 2020-08-26

## 2020-08-26 NOTE — TELEPHONE ENCOUNTER
----- Message from JOSE Mack CNP sent at 8/24/2020  5:16 PM EDT -----  EF is still low at 40-45%. I would like to add hydrallzine 10 mg TID to what he is currently taking for his low heart function. We will try to up titrate this medication and possibly get him off of the amlodipine. I have ordered the medication - please advise patient.

## 2020-09-02 DIAGNOSIS — Z79.899 ENCOUNTER FOR LONG-TERM (CURRENT) USE OF OTHER MEDICATIONS: ICD-10-CM

## 2020-09-02 DIAGNOSIS — I48.21 PERMANENT ATRIAL FIBRILLATION (HCC): ICD-10-CM

## 2020-09-02 LAB
A/G RATIO: 1.3 (ref 1.1–2.2)
ALBUMIN SERPL-MCNC: 4.1 G/DL (ref 3.4–5)
ALP BLD-CCNC: 109 U/L (ref 40–129)
ALT SERPL-CCNC: 36 U/L (ref 10–40)
ANION GAP SERPL CALCULATED.3IONS-SCNC: 9 MMOL/L (ref 3–16)
AST SERPL-CCNC: 51 U/L (ref 15–37)
BASOPHILS ABSOLUTE: 0 K/UL (ref 0–0.2)
BASOPHILS RELATIVE PERCENT: 0.5 %
BILIRUB SERPL-MCNC: 0.6 MG/DL (ref 0–1)
BUN BLDV-MCNC: 44 MG/DL (ref 7–20)
C3 COMPLEMENT: 107.3 MG/DL (ref 90–180)
C4 COMPLEMENT: 23 MG/DL (ref 10–40)
CALCIUM SERPL-MCNC: 9.8 MG/DL (ref 8.3–10.6)
CHLORIDE BLD-SCNC: 105 MMOL/L (ref 99–110)
CO2: 25 MMOL/L (ref 21–32)
CREAT SERPL-MCNC: 2.5 MG/DL (ref 0.8–1.3)
EOSINOPHILS ABSOLUTE: 0.3 K/UL (ref 0–0.6)
EOSINOPHILS RELATIVE PERCENT: 6.1 %
GFR AFRICAN AMERICAN: 31
GFR NON-AFRICAN AMERICAN: 26
GLOBULIN: 3.1 G/DL
GLUCOSE BLD-MCNC: 82 MG/DL (ref 70–99)
HCT VFR BLD CALC: 39.9 % (ref 40.5–52.5)
HEMOGLOBIN: 12.9 G/DL (ref 13.5–17.5)
INR BLD: 2.05 (ref 0.86–1.14)
LYMPHOCYTES ABSOLUTE: 1.9 K/UL (ref 1–5.1)
LYMPHOCYTES RELATIVE PERCENT: 34.3 %
MCH RBC QN AUTO: 31.5 PG (ref 26–34)
MCHC RBC AUTO-ENTMCNC: 32.3 G/DL (ref 31–36)
MCV RBC AUTO: 97.7 FL (ref 80–100)
MONOCYTES ABSOLUTE: 0.5 K/UL (ref 0–1.3)
MONOCYTES RELATIVE PERCENT: 9.6 %
NEUTROPHILS ABSOLUTE: 2.7 K/UL (ref 1.7–7.7)
NEUTROPHILS RELATIVE PERCENT: 49.5 %
PARATHYROID HORMONE INTACT: 117.1 PG/ML (ref 14–72)
PDW BLD-RTO: 15.4 % (ref 12.4–15.4)
PLATELET # BLD: 124 K/UL (ref 135–450)
PMV BLD AUTO: 9.7 FL (ref 5–10.5)
POTASSIUM SERPL-SCNC: 5.2 MMOL/L (ref 3.5–5.1)
PROTHROMBIN TIME: 24 SEC (ref 10–13.2)
RBC # BLD: 4.08 M/UL (ref 4.2–5.9)
RHEUMATOID FACTOR: <10 IU/ML
SODIUM BLD-SCNC: 139 MMOL/L (ref 136–145)
TOTAL PROTEIN: 7.2 G/DL (ref 6.4–8.2)
URIC ACID, SERUM: 3.3 MG/DL (ref 3.5–7.2)
WBC # BLD: 5.5 K/UL (ref 4–11)

## 2020-09-03 ENCOUNTER — TELEPHONE (OUTPATIENT)
Dept: CARDIOLOGY CLINIC | Age: 70
End: 2020-09-03

## 2020-09-03 ENCOUNTER — ANTI-COAG VISIT (OUTPATIENT)
Dept: CARDIOLOGY CLINIC | Age: 70
End: 2020-09-03
Payer: MEDICARE

## 2020-09-03 PROCEDURE — 93793 ANTICOAG MGMT PT WARFARIN: CPT | Performed by: NURSE PRACTITIONER

## 2020-09-03 NOTE — PROGRESS NOTES
ANTICOAGULATION MONITORING    [facility], 1950      Anticoagulation Indication PAF non valvular  Persistent   Referring Physician:   Mk Rosado  Goal INR Range:  2/3Duration of Anticoagulation Therapy:      Lab Draw:  Product patient has at home: warfarin 5  mg    Recent INR Results:  Lab Results   Component Value Date    INR 2.05 (H) 09/02/2020    INR 1.54 (H) 07/07/2020    INR 2.20 (H) 06/09/2020    INR 2.46 (H) 03/11/2020       INR Summary                          Warfarin regimen (mg)  Date INR A/P Sun Mon Tue Wed Thu Fri Sat Mg/wk   9/2/20 2.05 At goal, no change 7.5 7.5 7.5 7.5 7.5 7.5 7.5 52.5   7/11/19 1.1 Below goal 7.5mg 7.5mg 7.5mg 7.5mg 7.5mg 7.5mg 7.5mg 52.5   6/10/19 2.6 At goal 5mg 7.5mg 7.5mg 7.5mg 7.5mg 7.5mg 7.5mg 50 mg   5/23/19 1.3 Below goal 7.5mg 7.5mg 7.5mg 7.5mg 7.5mg 7.5mg 7.5mg 52.5   5/14/19 1.1 Below goal 5mg 7.5mg 7.5mg 7.5mg 7.5mg 7.5mg 7.5mg 50mg   4/29/19 1.9 Below goal 7.5mg 7.5mg 7.5mg 7.5mg 7.5mg 5mg 7.5mg 50mg   4/15/19 3.9 Above goal 7.5mg 7.5mg 5mg 7.5mg 7.5mg 5mg 7.5mg 47.5   4/3/19 4.3 Above goals 7.5mg 7.5mg 7.5mg 7.5mg 7.5mg hold hold 37.5   3/25/19 6.6 Above goals           3/7/19 1.0 Below goal 10mg 10mg 10mg 10mg 10mg 10mg 10mg 70mg                                                ANTICOAGULATION MONITORING    [facility], 1950      Anticoagulation Indication PAF non valvular  Persistent   Referring Physician:   Mk Rosado  Goal INR Range:  2/3Duration of Anticoagulation Therapy:      Lab Draw:  Product patient has at home: warfarin 5  mg    Recent INR Results:  Lab Results   Component Value Date    INR 2.05 (H) 09/02/2020    INR 1.54 (H) 07/07/2020    INR 2.20 (H) 06/09/2020    INR 2.46 (H) 03/11/2020       INR Summary                          Warfarin regimen (mg)  Date INR A/P Sun Mon Tue Wed Thu Fri Sat Mg/wk   6/9/20  2.2  At goal 10mg 10mg 10mg 10mg 7.5mg 10mg 10mg 67.5mg   2/6/20 1.7 Below goal           1/2/20 1.1 Below goal           9/18/19 2.2 At goal 7.5mg 7.5mg 7.5mg 7.5mg 10mg 7.5mg 7.5mg 55mg   8/28/19 1.0 Below goal 7.5mg 7.5mg 7.5mg 7.5mg 10mg 7.5mg 7.5mg 55mg   7/24/19 2.0 At goal 7.5mg 7.5mg 7.5mg 7.5mg 7.5mg 7.5mg 7.5mg 52.5   7/10/19 1.1 Below goal 7.5mg 7.5mg 7.5mg 7.5mg 7.5mg 7.5mg 7.5mg 52.5   6/10/19 2.6 At goal 5mg 7.5mg 7.5mg 7.5mg 7.5mg 7.5mg 7.5mg 50 mg   5/23/19 1.3 Below goal 7.5mg 7.5mg 7.5mg 7.5mg 7.5mg 7.5mg 7.5mg 52.5   5/14/19 1.1 Below goal 5mg 7.5mg 7.5mg 7.5mg 7.5mg 7.5mg 7.5mg 50mg   4/29/19 1.9 Below goal 7.5mg 7.5mg 7.5mg 7.5mg 7.5mg 5mg 7.5mg 50mg   4/15/19 3.9 Above goal 7.5mg 7.5mg 5mg 7.5mg 7.5mg 5mg 7.5mg 47.5   4/3/19 4.3 Above goals 7.5mg 7.5mg 7.5mg 7.5mg 7.5mg hold hold 37.5   3/25/19 6.6 Above goals           3/7/19 1.0 Below goal 10mg 10mg 10mg 10mg 10mg 10mg 10mg 70mg                                                   Called and left  for pt to call back to confirm dose.      Next INR check: 9/30/20     Addendum:  Reviewed documentation and plan of care from RN  Agree with above  Sheldon Smith NP

## 2020-09-05 LAB
IGA: 50 MG/DL (ref 70–400)
IGG: 1941 MG/DL (ref 700–1600)
IGM: 55 MG/DL (ref 40–230)
KAPPA, FREE LIGHT CHAINS, SERUM: 300.24 MG/L (ref 3.3–19.4)
KAPPA/LAMBDA RATIO: 5.41 (ref 0.26–1.65)
KAPPA/LAMBDA TEST COMMENT: ABNORMAL
LAMBDA, FREE LIGHT CHAINS, SERUM: 55.46 MG/L (ref 5.71–26.3)

## 2020-09-07 LAB — CRYOGLOBULIN, QUALITATIVE: NORMAL

## 2020-09-08 LAB
ALBUMIN SERPL-MCNC: 3.2 G/DL (ref 3.1–4.9)
ALPHA-1-GLOBULIN: 0.3 G/DL (ref 0.2–0.4)
ALPHA-2-GLOBULIN: 0.9 G/DL (ref 0.4–1.1)
BETA GLOBULIN: 0.9 G/DL (ref 0.9–1.6)
GAMMA GLOBULIN: 2 G/DL (ref 0.6–1.8)
M SPIKE 1 SERUM PROTEIN ELEC: 1.5 G/DL
SPE/IFE INTERPRETATION: NORMAL

## 2020-09-09 ENCOUNTER — TELEPHONE (OUTPATIENT)
Dept: CARDIOLOGY CLINIC | Age: 70
End: 2020-09-09

## 2020-09-25 ENCOUNTER — ANTI-COAG VISIT (OUTPATIENT)
Dept: CARDIOLOGY CLINIC | Age: 70
End: 2020-09-25

## 2020-09-25 ENCOUNTER — OFFICE VISIT (OUTPATIENT)
Dept: CARDIOLOGY CLINIC | Age: 70
End: 2020-09-25
Payer: MEDICARE

## 2020-09-25 VITALS
TEMPERATURE: 97.2 F | HEART RATE: 60 BPM | DIASTOLIC BLOOD PRESSURE: 76 MMHG | WEIGHT: 289 LBS | BODY MASS INDEX: 37.11 KG/M2 | SYSTOLIC BLOOD PRESSURE: 94 MMHG

## 2020-09-25 PROCEDURE — 99214 OFFICE O/P EST MOD 30 MIN: CPT | Performed by: NURSE PRACTITIONER

## 2020-09-25 RX ORDER — CHLORTHALIDONE 25 MG/1
25 TABLET ORAL DAILY
Status: ON HOLD | COMMUNITY
Start: 2020-09-04 | End: 2022-05-31 | Stop reason: CLARIF

## 2020-09-25 NOTE — PROGRESS NOTES
[facility]  Office Visit           Romelia Jernigan MD,  [facility]       Cardiology             Casa Neil  1950 September 25, 2020    Primary Cardiologist:  Ar Florence     CC:Interval Hx: Mr. Lilly Mullins follows cardiology for HTN optimal /  HLD optimal LDL  CRD / follows neph  us renals 5/9/19 No hydronephrosis  Obesity Body mass index is 37.11 kg/m². discussed diet and activity   pAF on amio, on coumadin / discussed DOAC he wants to stay with coumadin    NICMP, EF as low as 30% (2010), 50-55% (2016),   s/p C 5/10/20 with an EF 30-35%,  Echo 8/2020 EF 40-45% improved   s/p SJM CRT-D with a surgically implanted LV lead, s/p gen change 10/12/2015, s/p gen change 7/7/20, on amiodarone (2013). Today VSS b/p soft today / he is asymptomatic    No c/o cp / SOB/ PND or SULMA     [facility] 5/10/19 Left Main: Normal   Left Anterior Descending: Tortuosity with mild proximal calcification but no significant stenosis. Dorothye Hero is a large septal  that has some ostial narrowing of 60-70%. Circumflex: Large vessel probably codominant.  Mild plaquing but no significant stenoses. Right Coronary: The vessel is probably codominant.  Mild plaque but no significant stenosis. Left Ventriculogram: Ejection fraction 30-35%.  Anterior wall apex is akinetic suggesting regional wall motion abnormality but we do not find a coronary lesion that would cause this problem. Assessment: Mild coronary artery disease but does not require intervention. Cardiomyopathy ischemic appearing with regional wall motion in the anterior wall and apex akinesia and reduced ejection fraction  Recommendations: Optimize medical care continue dual antiplatelet therapy. viewed most recent test with pt. with hx palpitations/ denies having any recent  palpitations       Echo : 8/19/20  Normal left ventricular size. Severe left ventricular hypertrophy.    Mildly decreased left ventricular systolic function with an estimated   ejection fraction of 40-45%. Mild global hypokinesis. Indeterminate diastolic function. Abnormal Global strain: -7.9%   The left atrium is dilated. The right atrium is dilated. The aortic root is dilated measuring 4.4 cm. The ascending aorta is dilated measuring 4.6 cm. Mitral annular calcification is present. Trivial mitral regurgitation. The aortic valve appears sclerotic but opens well. Pacer / ICD wire is visualized in the right ventricle. Mild tricuspid regurgitation. Trivial pulmonic regurgitation present. Estimated pulmonary artery systolic pressure is at mmHg assuming a right   atrial pressure of 3 mmHg. I have examined pt and reviewed notes / any lab work EKGs stress test, angiograms, & images reviewed  I  have spent >20 minutes of face to face time with the patient with more than 50% spent counseling and coordinating care this patient. Review of Systems:  Constitutional: Denies  fatigue, weakness, night sweats or fever. HEENT: Denies new visual changes, ringing in ears, nosebleeds,nasal congestion  Respiratory: Denies new or change in SOB, PND, orthopnea or cough. Cardiovascular: see HPI  GI: Denies N/V, diarrhea, constipation, abdominal pain, change in bowel habits, melena or hematochezia  : Denies urinary frequency, urgency, incontinence, hematuria or dysuria. Skin: Denies rash, hives, or cyanosis  Musculoskeletal: Denies joint or muscle aches/pain  Neurological: Denies syncope or TIA-like symptoms.   Psychiatric: Denies anxiety, insomnia or depression     Past Medical History:   Diagnosis Date    Atrial fibrillation ([facility] [address]     CHF (congestive heart failure) ([facility])     Dr. Rausch(cardiologist)    CKD (chronic kidney disease) stage 3, GFR 30-59 ml/min ([facility])     Fracture     R index finger and L ankle    Gout 2000    Hnands/feet/elbows    Hypertension     Pacemaker      Past Surgical History:   Procedure Laterality Date  CARDIAC CATHETERIZATION       Family History   Problem Relation Age of Onset    Heart Disease Father         CHF- of    High Blood Pressure Father     Heart Disease Mother     High Blood Pressure Mother     High Blood Pressure Brother         Stent placement x2     Social History     Tobacco Use    Smoking status: Passive Smoke Exposure - Never Smoker    Smokeless tobacco: Never Used   Substance Use Topics    Alcohol use:  Yes     Alcohol/week: 0.0 standard drinks     Comment: occasionally    Drug use: Yes     Comment: Quit using in        Allergies   Allergen Reactions    Peanut-Containing Drug Products Anaphylaxis    Penicillins      Current Outpatient Medications   Medication Sig Dispense Refill    chlorthalidone (HYGROTON) 25 MG tablet Take 25 mg by mouth daily      magnesium oxide (MAG-OX) 400 (241.3 Mg) MG TABS tablet TAKE ONE TABLET BY MOUTH TWICE A DAY 60 tablet 5    hydrALAZINE (APRESOLINE) 10 MG tablet Take 1 tablet by mouth 3 times daily 90 tablet 3    isosorbide mononitrate (IMDUR) 30 MG extended release tablet TAKE ONE TABLET BY MOUTH DAILY 90 tablet 3    carvedilol (COREG) 25 MG tablet TAKE ONE TABLET BY MOUTH TWICE A DAY WITH MEALS 180 tablet 3    amLODIPine (NORVASC) 5 MG tablet TAKE ONE TABLET BY MOUTH DAILY 90 tablet 3    amiodarone (PACERONE) 100 MG tablet TAKE ONE TABLET BY MOUTH DAILY 90 tablet 2    amiodarone (CORDARONE) 200 MG tablet TAKE ONE TABLET BY MOUTH DAILY 90 tablet 3    sildenafil (VIAGRA) 100 MG tablet TAKE ONE TABLET BY MOUTH DAILY AS NEEDED FOR ERECTILE DYSFUNCTION 10 tablet 3    levothyroxine (SYNTHROID) 50 MCG tablet Take 50 mcg by mouth daily      sildenafil (VIAGRA) 100 MG tablet TAKE ONE TABLET BY MOUTH DAILY AS NEEDED FOR ERECTILE DYSFUNCTION 10 tablet 0    warfarin (COUMADIN) 5 MG tablet TAKE TWO TABLETS BY MOUTH DAILY 60 tablet 2    aspirin 81 MG chewable tablet Take 1 tablet by mouth daily 90 tablet 5    sodium bicarbonate 650 MG tablet Take 1 tablet by mouth 2 times daily 60 tablet 0    nitroGLYCERIN (NITROSTAT) 0.4 MG SL tablet up to max of 3 total doses. If no relief after 1 dose, call 911. 25 tablet 0    warfarin (COUMADIN) 7.5 MG tablet Take 7.5 mg by mouth daily Saturday take 5 MG. Take 7.5mg on all other days.  allopurinol (ZYLOPRIM) 300 MG tablet Take 1 tablet by mouth daily 90 tablet 1     No current facility-administered medications for this visit. Physical Exam:   BP 94/76   Pulse 60   Temp 97.2 °F (36.2 °C)   Wt 289 lb (131.1 kg)   BMI 37.11 kg/m²   BP Readings from Last 3 Encounters:   09/25/20 94/76   08/05/20 132/82   06/16/20 (!) 158/102     Pulse Readings from Last 3 Encounters:   09/25/20 60   08/05/20 60   06/16/20 78     Wt Readings from Last 3 Encounters:   09/25/20 289 lb (131.1 kg)   08/05/20 295 lb (133.8 kg)   07/07/20 280 lb (127 kg)     Constitutional: He is oriented to person, place, and time. He appears well-developed and well-nourished. In no acute distress. HEENT: Normocephalic and atraumatic. Sclerae anicteric. No xanthelasmas. Conjunctiva white, no subconjunctival hemorrhage   External inspection of ears nose teeth & gums   Eyes:PERRLA EOM's intact. Neck: Neck supple. No JVD present. Carotids without bruits. No mass and no thyromegaly present. No lymphadenopathy present. Cardiovascular: RRR, normal S1 and S2; no murmur/gallop or rub, PMI nondisplaced  Pulmonary/Chest: Effort normal.  Lungs clear to auscultation. Chest wall nontender  Abdominal: soft, nontender, nondistended. + bowel sounds; no organomegaly or bruits. Aorta normal  Extremities: No edema, cyanosis, or clubbing. Pulses are 2+ radial/carotid/dorsalis pedis bilaterally. Cap refill brisk. Neurological: No cranial nerve deficit. Psychiatric: He has a normal mood and affect.  His speech is normal and behavior is normal.     Lab Review:   Lab Results   Component Value Date    TRIG 70 05/09/2019    HDL 55 05/09/2019 LDLCALC 81 05/09/2019    LABVLDL 14 05/09/2019     Lab Results   Component Value Date     09/02/2020    K 5.2 09/02/2020    K 5.8 05/08/2019     09/02/2020    CO2 25 09/02/2020    BUN 44 09/02/2020    CREATININE 2.5 09/02/2020    GLUCOSE 82 09/02/2020    CALCIUM 9.8 09/02/2020      Lab Results   Component Value Date    WBC 5.5 09/02/2020    HGB 12.9 (L) 09/02/2020    HCT 39.9 (L) 09/02/2020    MCV 97.7 09/02/2020     (L) 09/02/2020       I have reviewed any available labs, images, any stress test, LHC on this pt       Assessment:    CAD   s/p LHC 5/10/20 with an EF 30-35%,  Echo 8/2020 EF 40-45% improved   s/p SJM CRT-D with a surgically implanted LV lead, s/p gen change 10/12/2015, s/p gen change 7/7/20, on amiodarone (2013). HTN optimal    HLD optimal LDL    CRD  follows neph    us renals 5/9/19 No hydronephrosis    Obesity Body mass index is 37.11 kg/m². discussed diet and activity     pAF on amio, on coumadin / discussed DOAC he wants to stay with coumadin    Will send to coumadin clinic   Denies bleeding or falls     NICMP  EF now 40-45% improved    s/p SJM CRT-D with a surgically implanted LV lead, s/p gen change 10/12/2015, s/p gen change 7/7/20, on amiodarone (2013). interrogation 8/20  afib Device interrogation by company representative. See interrogation for further details. Device check shows 54% AP, >99% , 42% AT/AF burden with longest & most recent episode on 8/1/20 lasting 2 days and 19 hrs, 1 episode of NSVT  lasting 6 beats on 8/3/20, all other parameters stable, estimated longevity is 4 years. AT/AF burden noted to have steadily increased over the past few  months, burden 11% in 3/20 and 30% in 6/20, now 42%. Corvue is within normal limits. Follow up in 3 months via [id number].     Plan:   Fu in 6 months  blood work in 6 months PTV   Plan to go to [address] [place] [address] at [address]

## 2020-09-28 ENCOUNTER — TELEPHONE (OUTPATIENT)
Dept: PHARMACY | Age: 70
End: 2020-09-28

## 2020-09-28 NOTE — TELEPHONE ENCOUNTER
Received referral from Lizzie Shabazz NP to manage warfarin therapy. Called and spoke with patient. He does not wish to enroll at the clinic because he prefers  over poc fingerstick INRs. Pt will continue going to the lab and be managed by cardiology office.     Cathleen Mobley, PharmD  [facility]   [facility] Ph: [phone]  [facility] Ph: [phone]  9/28/2020 2:55 PM

## 2020-10-19 NOTE — PROGRESS NOTES
[facility]   Cardiac Consultation    Referring Provider:  Silvia Whitman MD     Chief Concern: AF, CHF, CM, CKD. HPI:  Jose Amezcua is a 78 y.o. male with PMH of HTN, HLD, PAF on amiodarone, and NICMP. S/p SJM Bi-V ICD implant with a surgically implanted epicardial LV lead due to difficulty with insertion of LV lead in 12/2010. At the time of his ICD implant, his EF was 30%, but his last echo in 2016 showed that his EF improved to 50-55%. His device was replaced 10/12/15. S/p gen change 7/7/20  Pt was admitted in 5/2020 for CP. Following abnormal Lexiscan, he underwent a LHC (5/10/20, Dr. Raulito Kahn) that showed mild CAD, but not requiring intervention and EF of 30-35%. Currently on amiodarone 100 mg daily, Coreg 25 mg BID, baby ASA, and warfarin managed by Dr. Raulito Kahn. No ACEi/ARB due to CKD. EF on 8/19/20 was 40-45%. The patient is here for a 4 month follow up of arrhythmias and pacing system. Device check on 10/23/20 shows 66% AP, 99% , 29% AF burden other parameters stable with 4 years of battery life left. Patient denies complaints of CP, SOB, dizziness, palpitations, orthopnea, or presycope/syncope. He stays active walking and taking care of his grand kids. He reports good activity tolerance. He states that he feels good and the increased AF burden is not noticeable or bothersome. Past Medical History:   has a past medical history of Atrial fibrillation ([address] CHF (congestive heart failure) ([address] CKD (chronic kidney disease) stage 3, GFR 30-59 ml/min, Fracture, Gout, Hypertension, and Pacemaker. Surgical History:   has a past surgical history that includes Cardiac catheterization (5-2004). Social History:   reports that he is a non-smoker but has been exposed to tobacco smoke. He has never used smokeless tobacco. He reports current alcohol use. He reports current drug use.      Family History:  family history includes Heart Disease in his father and mother; High Blood Pressure in his brother, father, and mother. Home Medications:  Outpatient Encounter Medications as of 10/23/2020   Medication Sig Dispense Refill    chlorthalidone (HYGROTON) 25 MG tablet Take 25 mg by mouth daily      magnesium oxide (MAG-OX) 400 (241.3 Mg) MG TABS tablet TAKE ONE TABLET BY MOUTH TWICE A DAY 60 tablet 5    hydrALAZINE (APRESOLINE) 10 MG tablet Take 1 tablet by mouth 3 times daily 90 tablet 3    isosorbide mononitrate (IMDUR) 30 MG extended release tablet TAKE ONE TABLET BY MOUTH DAILY 90 tablet 3    carvedilol (COREG) 25 MG tablet TAKE ONE TABLET BY MOUTH TWICE A DAY WITH MEALS 180 tablet 3    amLODIPine (NORVASC) 5 MG tablet TAKE ONE TABLET BY MOUTH DAILY 90 tablet 3    amiodarone (PACERONE) 100 MG tablet TAKE ONE TABLET BY MOUTH DAILY 90 tablet 2    sildenafil (VIAGRA) 100 MG tablet TAKE ONE TABLET BY MOUTH DAILY AS NEEDED FOR ERECTILE DYSFUNCTION 10 tablet 3    levothyroxine (SYNTHROID) 50 MCG tablet Take 50 mcg by mouth daily      warfarin (COUMADIN) 5 MG tablet TAKE TWO TABLETS BY MOUTH DAILY 60 tablet 2    aspirin 81 MG chewable tablet Take 1 tablet by mouth daily 90 tablet 5    sodium bicarbonate 650 MG tablet Take 1 tablet by mouth 2 times daily 60 tablet 0    nitroGLYCERIN (NITROSTAT) 0.4 MG SL tablet up to max of 3 total doses. If no relief after 1 dose, call 911. 25 tablet 0    warfarin (COUMADIN) 7.5 MG tablet Take 7.5 mg by mouth daily Saturday take 5 MG. Take 7.5mg on all other days.  allopurinol (ZYLOPRIM) 300 MG tablet Take 1 tablet by mouth daily 90 tablet 1     No facility-administered encounter medications on file as of 10/23/2020. Allergies:  Peanut-containing drug products and Penicillins     Review of Systems   Constitutional: Negative. HENT: Negative. Eyes: Negative. Respiratory: Negative. Cardiovascular: Negative. Gastrointestinal: Negative. Genitourinary: Negative. Musculoskeletal: Negative. Skin: Negative. increased AF burden including increase in medication and catheter ablation. 2. Bi-V ICD in situ: implanted in 12/2010 using a surgically implanted epicardial LV lead. S/P gen change on 7/7/20.   3.  Cardiomyopathy- nonischemic. Mildly decreased left ventricular systolic function with an estimated ejection fraction of 40-45% (8/2020). Plan:  1. Increase amiodarone to 200 mg daily for better AF suppression. 2. Continue other cardiac meds as ordered. 3. Continue activity as tolerated  4. Follow up with EP in 3 months with device check    I, Clarisa Encarnacion RN, am scribing for and in the presence of Dr. Kaye Mejia. 10/23/20   11:40 AM  Clarisa Encarnacion RN    I, Dr. Kaye Mejia, personally performed the services described in this documentation as scribed by Clarisa Encarnacion RN in my presence, and it is both accurate and complete.       Kaye Mejia M.D.

## 2020-10-23 ENCOUNTER — OFFICE VISIT (OUTPATIENT)
Dept: CARDIOLOGY CLINIC | Age: 70
End: 2020-10-23
Payer: MEDICARE

## 2020-10-23 ENCOUNTER — NURSE ONLY (OUTPATIENT)
Dept: CARDIOLOGY CLINIC | Age: 70
End: 2020-10-23
Payer: MEDICARE

## 2020-10-23 VITALS
BODY MASS INDEX: 37.66 KG/M2 | HEART RATE: 64 BPM | WEIGHT: 293.3 LBS | SYSTOLIC BLOOD PRESSURE: 120 MMHG | DIASTOLIC BLOOD PRESSURE: 80 MMHG

## 2020-10-23 PROCEDURE — 99214 OFFICE O/P EST MOD 30 MIN: CPT | Performed by: INTERNAL MEDICINE

## 2020-10-23 PROCEDURE — 93284 PRGRMG EVAL IMPLANTABLE DFB: CPT | Performed by: INTERNAL MEDICINE

## 2020-10-23 PROCEDURE — 93000 ELECTROCARDIOGRAM COMPLETE: CPT | Performed by: INTERNAL MEDICINE

## 2020-10-23 RX ORDER — AMIODARONE HYDROCHLORIDE 200 MG/1
TABLET ORAL
Qty: 30 TABLET | Refills: 3 | Status: SHIPPED | OUTPATIENT
Start: 2020-10-23 | End: 2021-03-08

## 2020-10-27 NOTE — PROGRESS NOTES
Patient comes in for programming evaluation for his CRTD defibrillator. [employer] rep present. Gen change on 7/7/20 by CASPER    3.4-4.0 years to [facility]  All sensing and pacing parameters are within normal range. Presenting ApBiVp@ 60 bpm  Underlying AsVs @ 45 bpm  AP 66%   99%  AT/AF burden 29%  CorVue WNL  Longest episode 3 days 8 hours occurring on 9/2/20. Fastest 640/109 peak bpm  Pt currently on amiodarone, mag-ox, carvedilol, warfarin  No changes need to be made at this time. Please see interrogation for more detail. Patient will see Ela Sheldon today and follow up in 3 months in office or remotely.

## 2020-11-06 RX ORDER — SILDENAFIL 100 MG/1
TABLET, FILM COATED ORAL
Qty: 10 TABLET | Refills: 0 | Status: SHIPPED | OUTPATIENT
Start: 2020-11-06 | End: 2020-12-14

## 2020-11-06 RX ORDER — WARFARIN SODIUM 5 MG/1
TABLET ORAL
Qty: 60 TABLET | Refills: 0 | Status: SHIPPED | OUTPATIENT
Start: 2020-11-06 | End: 2021-01-18

## 2020-11-09 DIAGNOSIS — I48.21 PERMANENT ATRIAL FIBRILLATION (HCC): ICD-10-CM

## 2020-11-09 DIAGNOSIS — Z79.899 ENCOUNTER FOR LONG-TERM (CURRENT) USE OF OTHER MEDICATIONS: ICD-10-CM

## 2020-11-09 LAB
INR BLD: 1.26 (ref 0.86–1.14)
PROTHROMBIN TIME: 14.7 SEC (ref 10–13.2)

## 2020-11-13 ENCOUNTER — TELEPHONE (OUTPATIENT)
Dept: CARDIOLOGY CLINIC | Age: 70
End: 2020-11-13

## 2020-11-16 NOTE — TELEPHONE ENCOUNTER
2.2 At goal 7.5mg 7.5mg 7.5mg 7.5mg 10mg 7.5mg 7.5mg 55mg   8/28/19 1.0 Below goal 7.5mg 7.5mg 7.5mg 7.5mg 10mg 7.5mg 7.5mg 55mg   7/24/19 2.0 At goal 7.5mg 7.5mg 7.5mg 7.5mg 7.5mg 7.5mg 7.5mg 52.5   7/10/19 1.1 Below goal 7.5mg 7.5mg 7.5mg 7.5mg 7.5mg 7.5mg 7.5mg 52.5   6/10/19 2.6 At goal 5mg 7.5mg 7.5mg 7.5mg 7.5mg 7.5mg 7.5mg 50 mg   5/23/19 1.3 Below goal 7.5mg 7.5mg 7.5mg 7.5mg 7.5mg 7.5mg 7.5mg 52.5   5/14/19 1.1 Below goal 5mg 7.5mg 7.5mg 7.5mg 7.5mg 7.5mg 7.5mg 50mg   4/29/19 1.9 Below goal 7.5mg 7.5mg 7.5mg 7.5mg 7.5mg 5mg 7.5mg 50mg   4/15/19 3.9 Above goal 7.5mg 7.5mg 5mg 7.5mg 7.5mg 5mg 7.5mg 47.5   4/3/19 4.3 Above goals 7.5mg 7.5mg 7.5mg 7.5mg 7.5mg hold hold 37.5   3/25/19 6.6 Above goals           3/7/19 1.0 Below goal 10mg 10mg 10mg 10mg 10mg 10mg 10mg 70mg                                                   Called and left VM for pt to call back to confirm dose.      Next INR check: 9/30/20     Addendum:  Reviewed documentation and plan of care from RN  Agree with above  Frida Kulkarni NP           [address], 1950      Anticoagulation Indication PAF non valvular  Persistent   Referring Physician:   Eduardo Wells  Goal INR Range:  2/3Duration of Anticoagulation Therapy:      Lab Draw:  Product patient has at home: warfarin 5  mg    Recent INR Results:  Lab Results   Component Value Date    INR 1.26 (H) 11/09/2020    INR 2.05 (H) 09/02/2020    INR 1.54 (H) 07/07/2020    INR 2.20 (H) 06/09/2020       INR Summary                          Warfarin regimen (mg)  Date INR A/P Sun Mon Tue Wed Thu Fri Sat Mg/wk   11/16/20 1.2 Below goal 7.5 10mg 7.5mg 7.5mg 10mg 7.5mg 7.5mg 57.5   9/2/20 2.05 At goal, no change 7.5 7.5 7.5 7.5 7.5 7.5 7.5 52.5   7/11/19 1.1 Below goal 7.5mg 7.5mg 7.5mg 7.5mg 7.5mg 7.5mg 7.5mg 52.5   6/10/19 2.6 At goal 5mg 7.5mg 7.5mg 7.5mg 7.5mg 7.5mg 7.5mg 50 mg   5/23/19 1.3 Below goal 7.5mg 7.5mg 7.5mg 7.5mg 7.5mg 7.5mg 7.5mg 52.5   5/14/19 1.1 Below goal 5mg 7.5mg 7.5mg 7.5mg 7.5mg 7.5mg 7.5mg 50mg   4/29/19 1.9 Below goal 7.5mg 7.5mg 7.5mg 7.5mg 7.5mg 5mg 7.5mg 50mg   4/15/19 3.9 Above goal 7.5mg 7.5mg 5mg 7.5mg 7.5mg 5mg 7.5mg 47.5   4/3/19 4.3 Above goals 7.5mg 7.5mg 7.5mg 7.5mg 7.5mg hold hold 37.5   3/25/19 6.6 Above goals           3/7/19 1.0 Below goal 10mg 10mg 10mg 10mg 10mg 10mg 10mg 70mg                                                ANTICOAGULATION MONITORING    Nicolette Thomas, 1950      Anticoagulation Indication PAF non valvular  Persistent   Referring Physician:   Carrie Stroud  Goal INR Range:  2/3Duration of Anticoagulation Therapy:      Lab Draw:  Product patient has at home: warfarin 5  mg    Recent INR Results:  Lab Results   Component Value Date    INR 1.26 (H) 11/09/2020    INR 2.05 (H) 09/02/2020    INR 1.54 (H) 07/07/2020    INR 2.20 (H) 06/09/2020       INR Summary                          Warfarin regimen (mg)  Date INR A/P Sun Mon Tue Wed Thu Fri Sat Mg/wk   11/9/20 1.2 Below goal 7.5mg 10mg 7.5mg 7.5mg 10mg 7.5mg 10mg 57.5   9/2/20 2.0            6/9/20  2.2  At goal 10mg 10mg 10mg 10mg 7.5mg 10mg 10mg 67.5mg   2/6/20 1.7 Below goal           1/2/20 1.1 Below goal           9/18/19   2.2 At goal 7.5mg 7.5mg 7.5mg 7.5mg 10mg 7.5mg 7.5mg 55mg   8/28/19 1.0 Below goal 7.5mg 7.5mg 7.5mg 7.5mg 10mg 7.5mg 7.5mg 55mg   7/24/19 2.0 At goal 7.5mg 7.5mg 7.5mg 7.5mg 7.5mg 7.5mg 7.5mg 52.5   7/10/19 1.1 Below goal 7.5mg 7.5mg 7.5mg 7.5mg 7.5mg 7.5mg 7.5mg 52.5   6/10/19 2.6 At goal 5mg 7.5mg 7.5mg 7.5mg 7.5mg 7.5mg 7.5mg 50 mg   5/23/19 1.3 Below goal 7.5mg 7.5mg 7.5mg 7.5mg 7.5mg 7.5mg 7.5mg 52.5   5/14/19 1.1 Below goal 5mg 7.5mg 7.5mg 7.5mg 7.5mg 7.5mg 7.5mg 50mg   4/29/19 1.9 Below goal 7.5mg 7.5mg 7.5mg 7.5mg 7.5mg 5mg 7.5mg 50mg   4/15/19 3.9 Above goal 7.5mg 7.5mg 5mg 7.5mg 7.5mg 5mg 7.5mg 47.5   4/3/19 4.3 Above goals 7.5mg 7.5mg 7.5mg 7.5mg 7.5mg hold hold 37.5   3/25/19 6.6 Above goals           3/7/19 1.0 Below goal 10mg 10mg 10mg 10mg 10mg 10mg 10mg 70mg                                                  Patient is below goal and was instructed to increase dose to 10 mg on Monday and Thursday and 7.5 all other days. Patient confirmed instructions.     Next INR check: 11/23/20     Addendum:  Reviewed documentation and plan of care from RN  Agree with above  Edward Franklin NP

## 2020-12-07 DIAGNOSIS — I48.21 PERMANENT ATRIAL FIBRILLATION (HCC): ICD-10-CM

## 2020-12-07 DIAGNOSIS — Z79.899 ENCOUNTER FOR LONG-TERM (CURRENT) USE OF OTHER MEDICATIONS: ICD-10-CM

## 2020-12-07 LAB
INR BLD: 3.28 (ref 0.86–1.14)
PROTHROMBIN TIME: 38.5 SEC (ref 10–13.2)

## 2020-12-09 ENCOUNTER — ANTI-COAG VISIT (OUTPATIENT)
Dept: CARDIOLOGY CLINIC | Age: 70
End: 2020-12-09
Payer: MEDICARE

## 2020-12-09 ENCOUNTER — TELEPHONE (OUTPATIENT)
Dept: CARDIOLOGY CLINIC | Age: 70
End: 2020-12-09

## 2020-12-09 PROCEDURE — 93793 ANTICOAG MGMT PT WARFARIN: CPT | Performed by: NURSE PRACTITIONER

## 2020-12-09 NOTE — TELEPHONE ENCOUNTER
The patient called requesting his PT/INR results and dosing instructions. Please call the patient back at [phone] to advise.

## 2020-12-09 NOTE — PROGRESS NOTES
ANTICOAGULATION MONITORING    Cheng Arteaga, 1950      Anticoagulation Indication PAF non valvular  Persistent   Referring Physician:   Carla Laureano  Goal INR Range:  2/3Duration of Anticoagulation Therapy:      Lab Draw:  Product patient has at home: warfarin 5  mg    Recent INR Results:  Lab Results   Component Value Date    INR 3.28 (H) 12/07/2020    INR 1.26 (H) 11/09/2020    INR 2.05 (H) 09/02/2020    INR 1.54 (H) 07/07/2020       INR Summary                          Warfarin regimen (mg)  Date INR A/P Sun Mon Tue Wed Thu Fri Sat Mg/wk   12/7/20 3.2 Above goal 7.5 7.5 7.5 7.5 7.5 7.5 7.5 52.5   9/2/20 2.05 At goal, no change 7.5 7.5 7.5 7.5 7.5 7.5 7.5 52.5   7/11/19 1.1 Below goal 7.5mg 7.5mg 7.5mg 7.5mg 7.5mg 7.5mg 7.5mg 52.5   6/10/19 2.6 At goal 5mg 7.5mg 7.5mg 7.5mg 7.5mg 7.5mg 7.5mg 50 mg   5/23/19 1.3 Below goal 7.5mg 7.5mg 7.5mg 7.5mg 7.5mg 7.5mg 7.5mg 52.5   5/14/19 1.1 Below goal 5mg 7.5mg 7.5mg 7.5mg 7.5mg 7.5mg 7.5mg 50mg   4/29/19 1.9 Below goal 7.5mg 7.5mg 7.5mg 7.5mg 7.5mg 5mg 7.5mg 50mg   4/15/19 3.9 Above goal 7.5mg 7.5mg 5mg 7.5mg 7.5mg 5mg 7.5mg 47.5   4/3/19 4.3 Above goals 7.5mg 7.5mg 7.5mg 7.5mg 7.5mg hold hold 37.5   3/25/19 6.6 Above goals           3/7/19 1.0 Below goal 10mg 10mg 10mg 10mg 10mg 10mg 10mg 70mg                                                ANTICOAGULATION MONITORING    Cheng Arteaga, 1950      Anticoagulation Indication PAF non valvular  Persistent   Referring Physician:   Carla Laureano  Goal INR Range:  2/3Duration of Anticoagulation Therapy:      Lab Draw:  Product patient has at home: warfarin 5  mg    Recent INR Results:  Lab Results   Component Value Date    INR 3.28 (H) 12/07/2020    INR 1.26 (H) 11/09/2020    INR 2.05 (H) 09/02/2020    INR 1.54 (H) 07/07/2020       INR Summary                          Warfarin regimen (mg)  Date INR A/P Sun Mon Tue Wed Thu Fri Sat Mg/wk   6/9/20  2.2  At goal 10mg 10mg 10mg 10mg 7.5mg 10mg 10mg 67.5mg   2/6/20 1.7 Below goal           1/2/20 1.1 Below goal           9/18/19   2.2 At goal 7.5mg 7.5mg 7.5mg 7.5mg 10mg 7.5mg 7.5mg 55mg   8/28/19 1.0 Below goal 7.5mg 7.5mg 7.5mg 7.5mg 10mg 7.5mg 7.5mg 55mg   7/24/19 2.0 At goal 7.5mg 7.5mg 7.5mg 7.5mg 7.5mg 7.5mg 7.5mg 52.5   7/10/19 1.1 Below goal 7.5mg 7.5mg 7.5mg 7.5mg 7.5mg 7.5mg 7.5mg 52.5   6/10/19 2.6 At goal 5mg 7.5mg 7.5mg 7.5mg 7.5mg 7.5mg 7.5mg 50 mg   5/23/19 1.3 Below goal 7.5mg 7.5mg 7.5mg 7.5mg 7.5mg 7.5mg 7.5mg 52.5   5/14/19 1.1 Below goal 5mg 7.5mg 7.5mg 7.5mg 7.5mg 7.5mg 7.5mg 50mg   4/29/19 1.9 Below goal 7.5mg 7.5mg 7.5mg 7.5mg 7.5mg 5mg 7.5mg 50mg   4/15/19 3.9 Above goal 7.5mg 7.5mg 5mg 7.5mg 7.5mg 5mg 7.5mg 47.5   4/3/19 4.3 Above goals 7.5mg 7.5mg 7.5mg 7.5mg 7.5mg hold hold 37.5   3/25/19 6.6 Above goals           3/7/19 1.0 Below goal 10mg 10mg 10mg 10mg 10mg 10mg 10mg 70mg                                                   Called and left  for pt to call back to confirm dose.      Next INR check: 9/30/20     Addendum:  Patient confirmed to stay on the same dose and to repeat protime in 2 weeks since he is above goal at 3.2  Reviewed INR   Above goal        AUSTIN Stauffer FNP

## 2020-12-14 RX ORDER — SILDENAFIL 100 MG/1
TABLET, FILM COATED ORAL
Qty: 10 TABLET | Refills: 1 | Status: SHIPPED | OUTPATIENT
Start: 2020-12-14 | End: 2021-03-19

## 2020-12-22 DIAGNOSIS — I48.21 PERMANENT ATRIAL FIBRILLATION (HCC): ICD-10-CM

## 2020-12-22 DIAGNOSIS — Z79.899 ENCOUNTER FOR LONG-TERM (CURRENT) USE OF OTHER MEDICATIONS: ICD-10-CM

## 2020-12-22 LAB
INR BLD: 3.46 (ref 0.86–1.14)
PROTHROMBIN TIME: 40.6 SEC (ref 10–13.2)

## 2020-12-23 ENCOUNTER — TELEPHONE (OUTPATIENT)
Dept: CARDIOLOGY CLINIC | Age: 70
End: 2020-12-23

## 2020-12-23 ENCOUNTER — ANTI-COAG VISIT (OUTPATIENT)
Dept: CARDIOLOGY CLINIC | Age: 70
End: 2020-12-23

## 2020-12-23 ENCOUNTER — ANTI-COAG VISIT (OUTPATIENT)
Dept: CARDIOLOGY CLINIC | Age: 70
End: 2020-12-23
Payer: MEDICARE

## 2020-12-23 PROCEDURE — 93793 ANTICOAG MGMT PT WARFARIN: CPT | Performed by: NURSE PRACTITIONER

## 2020-12-23 NOTE — TELEPHONE ENCOUNTER
Pt called in requesting to speak to Kerline Beebe regarding INR results. Pt can be reached at [phone].  Thanks

## 2020-12-23 NOTE — PROGRESS NOTES
ANTICOAGULATION MONITORING    Danuta Sheikh, 1950      Anticoagulation Indication PAF non valvular  Persistent   Referring Physician:   Srinivas Brantley  Goal INR Range:  2/3Duration of Anticoagulation Therapy:      Lab Draw:  Product patient has at home: warfarin 5  mg    Recent INR Results:  Lab Results   Component Value Date    INR 3.46 (H) 12/22/2020    INR 3.28 (H) 12/07/2020    INR 1.26 (H) 11/09/2020    INR 2.05 (H) 09/02/2020       INR Summary                          Warfarin regimen (mg)  Date INR A/P Sun Mon Tue Wed Thu Fri Sat Mg/wk   12/2220 3.4 Above goal 7.5 10 7.5 7.5 7.5 7.5 7.5 55mg   12/7/20 3.2 Above goal 7.5 7.5 7.5 7.5 7.5 7.5 7.5 52.5   9/2/20 2.05 At goal, no change 7.5 7.5 7.5 7.5 7.5 7.5 7.5 52.5   7/11/19 1.1 Below goal 7.5mg 7.5mg 7.5mg 7.5mg 7.5mg 7.5mg 7.5mg 52.5   6/10/19 2.6 At goal 5mg 7.5mg 7.5mg 7.5mg 7.5mg 7.5mg 7.5mg 50 mg   5/23/19 1.3 Below goal 7.5mg 7.5mg 7.5mg 7.5mg 7.5mg 7.5mg 7.5mg 52.5   5/14/19 1.1 Below goal 5mg 7.5mg 7.5mg 7.5mg 7.5mg 7.5mg 7.5mg 50mg   4/29/19 1.9 Below goal 7.5mg 7.5mg 7.5mg 7.5mg 7.5mg 5mg 7.5mg 50mg   4/15/19 3.9 Above goal 7.5mg 7.5mg 5mg 7.5mg 7.5mg 5mg 7.5mg 47.5   4/3/19 4.3 Above goals 7.5mg 7.5mg 7.5mg 7.5mg 7.5mg hold hold 37.5   3/25/19 6.6 Above goals           3/7/19 1.0 Below goal 10mg 10mg 10mg 10mg 10mg 10mg 10mg 70mg                                                ANTICOAGULATION MONITORING    Danuta Sheikh, 1950      Anticoagulation Indication PAF non valvular  Persistent   Referring Physician:   Srinivas Brantley  Goal INR Range:  2/3Duration of Anticoagulation Therapy:      Lab Draw:  Product patient has at home: warfarin 5  mg    Recent INR Results:  Lab Results   Component Value Date    INR 3.46 (H) 12/22/2020    INR 3.28 (H) 12/07/2020    INR 1.26 (H) 11/09/2020    INR 2.05 (H) 09/02/2020       INR Summary                          Warfarin regimen (mg)  Date INR A/P Sun Mon Tue Wed Thu Fri Sat Mg/wk 6/9/20  2.2  At goal 10mg 10mg 10mg 10mg 7.5mg 10mg 10mg 67.5mg   2/6/20 1.7 Below goal           1/2/20 1.1 Below goal           9/18/19   2.2 At goal 7.5mg 7.5mg 7.5mg 7.5mg 10mg 7.5mg 7.5mg 55mg   8/28/19 1.0 Below goal 7.5mg 7.5mg 7.5mg 7.5mg 10mg 7.5mg 7.5mg 55mg   7/24/19 2.0 At goal 7.5mg 7.5mg 7.5mg 7.5mg 7.5mg 7.5mg 7.5mg 52.5   7/10/19 1.1 Below goal 7.5mg 7.5mg 7.5mg 7.5mg 7.5mg 7.5mg 7.5mg 52.5   6/10/19 2.6 At goal 5mg 7.5mg 7.5mg 7.5mg 7.5mg 7.5mg 7.5mg 50 mg   5/23/19 1.3 Below goal 7.5mg 7.5mg 7.5mg 7.5mg 7.5mg 7.5mg 7.5mg 52.5   5/14/19 1.1 Below goal 5mg 7.5mg 7.5mg 7.5mg 7.5mg 7.5mg 7.5mg 50mg   4/29/19 1.9 Below goal 7.5mg 7.5mg 7.5mg 7.5mg 7.5mg 5mg 7.5mg 50mg   4/15/19 3.9 Above goal 7.5mg 7.5mg 5mg 7.5mg 7.5mg 5mg 7.5mg 47.5   4/3/19 4.3 Above goals 7.5mg 7.5mg 7.5mg 7.5mg 7.5mg hold hold 37.5   3/25/19 6.6 Above goals           3/7/19 1.0 Below goal 10mg 10mg 10mg 10mg 10mg 10mg 10mg 70mg                                                   Addendum:  Patient confirmed to decrease his dose to 7.5 mg qd and to repeat his protime on Monday 12/28/20  Reviewed INR  At 3.4  Above goal        [facility] APRN, CVNP FNP

## 2020-12-28 ENCOUNTER — ANTI-COAG VISIT (OUTPATIENT)
Dept: CARDIOLOGY CLINIC | Age: 70
End: 2020-12-28

## 2020-12-29 DIAGNOSIS — I48.21 PERMANENT ATRIAL FIBRILLATION (HCC): ICD-10-CM

## 2020-12-29 DIAGNOSIS — Z79.899 ENCOUNTER FOR LONG-TERM (CURRENT) USE OF OTHER MEDICATIONS: ICD-10-CM

## 2020-12-29 LAB
INR BLD: 2.93 (ref 0.86–1.14)
PROTHROMBIN TIME: 34.4 SEC (ref 10–13.2)

## 2020-12-30 ENCOUNTER — ANTI-COAG VISIT (OUTPATIENT)
Dept: CARDIOLOGY CLINIC | Age: 70
End: 2020-12-30

## 2020-12-30 NOTE — PROGRESS NOTES
ANTICOAGULATION MONITORING    Mylinda Route, 1950      Anticoagulation Indication PAF non valvular  Persistent range 2-3   Referring Physician:   Zenon Alcantara  Goal INR Range:  2/3Duration of Anticoagulation Therapy:      Lab Draw:  Product patient has at home: warfarin 5  mg    Recent INR Results:  Lab Results   Component Value Date    INR 2.93 (H) 12/29/2020    INR 3.46 (H) 12/22/2020    INR 3.28 (H) 12/07/2020    INR 1.26 (H) 11/09/2020       INR Summary                          Warfarin regimen (mg)  Date INR A/P Sun Mon Tue Wed Thu Fri Sat Mg/wk   12/2220 3.4 Above goal 7.5 10 7.5 7.5 7.5 7.5 7.5 55mg   12/7/20 3.2 Above goal 7.5 7.5 7.5 7.5 7.5 7.5 7.5 52.5   9/2/20 2.05 At goal, no change 7.5 7.5 7.5 7.5 7.5 7.5 7.5 52.5   7/11/19 1.1 Below goal 7.5mg 7.5mg 7.5mg 7.5mg 7.5mg 7.5mg 7.5mg 52.5   6/10/19 2.6 At goal 5mg 7.5mg 7.5mg 7.5mg 7.5mg 7.5mg 7.5mg 50 mg   5/23/19 1.3 Below goal 7.5mg 7.5mg 7.5mg 7.5mg 7.5mg 7.5mg 7.5mg 52.5   5/14/19 1.1 Below goal 5mg 7.5mg 7.5mg 7.5mg 7.5mg 7.5mg 7.5mg 50mg   4/29/19 1.9 Below goal 7.5mg 7.5mg 7.5mg 7.5mg 7.5mg 5mg 7.5mg 50mg   4/15/19 3.9 Above goal 7.5mg 7.5mg 5mg 7.5mg 7.5mg 5mg 7.5mg 47.5   4/3/19 4.3 Above goals 7.5mg 7.5mg 7.5mg 7.5mg 7.5mg hold hold 37.5   3/25/19 6.6 Above goals           3/7/19 1.0 Below goal 10mg 10mg 10mg 10mg 10mg 10mg 10mg 70mg                                                ANTICOAGULATION MONITORING    Mylinda Route, 1950      Anticoagulation Indication PAF non valvular  Persistent   Referring Physician:   Zenon Alcantara  Goal INR Range:  2/3Duration of Anticoagulation Therapy:      Lab Draw:  Product patient has at home: warfarin 5  mg    Recent INR Results:  Lab Results   Component Value Date    INR 2.93 (H) 12/29/2020    INR 3.46 (H) 12/22/2020    INR 3.28 (H) 12/07/2020    INR 1.26 (H) 11/09/2020       INR Summary                          Warfarin regimen (mg)  Date INR A/P Sun Mon Tue Wed Thu Fri Sat Mg/wk 6/9/20  2.2  At goal 10mg 10mg 10mg 10mg 7.5mg 10mg 10mg 67.5mg   2/6/20 1.7 Below goal           1/2/20 1.1 Below goal           9/18/19   2.2 At goal 7.5mg 7.5mg 7.5mg 7.5mg 10mg 7.5mg 7.5mg 55mg   8/28/19 1.0 Below goal 7.5mg 7.5mg 7.5mg 7.5mg 10mg 7.5mg 7.5mg 55mg   7/24/19 2.0 At goal 7.5mg 7.5mg 7.5mg 7.5mg 7.5mg 7.5mg 7.5mg 52.5   7/10/19 1.1 Below goal 7.5mg 7.5mg 7.5mg 7.5mg 7.5mg 7.5mg 7.5mg 52.5   6/10/19 2.6 At goal 5mg 7.5mg 7.5mg 7.5mg 7.5mg 7.5mg 7.5mg 50 mg   5/23/19 1.3 Below goal 7.5mg 7.5mg 7.5mg 7.5mg 7.5mg 7.5mg 7.5mg 52.5   5/14/19 1.1 Below goal 5mg 7.5mg 7.5mg 7.5mg 7.5mg 7.5mg 7.5mg 50mg   4/29/19 1.9 Below goal 7.5mg 7.5mg 7.5mg 7.5mg 7.5mg 5mg 7.5mg 50mg   4/15/19 3.9 Above goal 7.5mg 7.5mg 5mg 7.5mg 7.5mg 5mg 7.5mg 47.5   4/3/19 4.3 Above goals 7.5mg 7.5mg 7.5mg 7.5mg 7.5mg hold hold 37.5   3/25/19 6.6 Above goals           3/7/19 1.0 Below goal 10mg 10mg 10mg 10mg 10mg 10mg 10mg 70mg                                                   Addendum:  Patient confirmed to decrease his dose to 7.5 mg qd and to repeat his protime on Monday 12/28/20  Reviewed INR  At 3.4  Above goal        [address], [address] [facility], 1950      Anticoagulation Indication PAF non valvular  Persistent   Referring Physician:   Cata Huynh  Goal INR Range:  2/3Duration of Anticoagulation Therapy:      Lab Draw:  Product patient has at home: warfarin 5  mg    Recent INR Results:  Lab Results   Component Value Date    INR 2.93 (H) 12/29/2020    INR 3.46 (H) 12/22/2020    INR 3.28 (H) 12/07/2020    INR 1.26 (H) 11/09/2020       INR Summary                          Warfarin regimen (mg)  Date INR A/P Sun Mon Tue Wed Thu Fri Sat Mg/wk   12/29/20 2.9 At goal 7.5 7.5 7.5 7.5 7.5 7.5 7.5 52.5mg   12/2220 3.4 Above goal 7.5 10 7.5 7.5 7.5 7.5 7.5 55mg   12/7/20 3.2 Above goal 7.5 7.5 7.5 7.5 7.5 7.5 7.5 52.5 9/2/20 2.05 At goal, no change 7.5 7.5 7.5 7.5 7.5 7.5 7.5 52.5   7/11/19 1.1 Below goal 7.5mg 7.5mg 7.5mg 7.5mg 7.5mg 7.5mg 7.5mg 52.5   6/10/19 2.6 At goal 5mg 7.5mg 7.5mg 7.5mg 7.5mg 7.5mg 7.5mg 50 mg   5/23/19 1.3 Below goal 7.5mg 7.5mg 7.5mg 7.5mg 7.5mg 7.5mg 7.5mg 52.5   5/14/19 1.1 Below goal 5mg 7.5mg 7.5mg 7.5mg 7.5mg 7.5mg 7.5mg 50mg   4/29/19 1.9 Below goal 7.5mg 7.5mg 7.5mg 7.5mg 7.5mg 5mg 7.5mg 50mg   4/15/19 3.9 Above goal 7.5mg 7.5mg 5mg 7.5mg 7.5mg 5mg 7.5mg 47.5   4/3/19 4.3 Above goals 7.5mg 7.5mg 7.5mg 7.5mg 7.5mg hold hold 37.5   3/25/19 6.6 Above goals           3/7/19 1.0 Below goal 10mg 10mg 10mg 10mg 10mg 10mg 10mg 70mg                                                ANTICOAGULATION MONITORING    Sasha Pandey, 1950      Anticoagulation Indication PAF non valvular  Persistent   Referring Physician:   Maribel Mckeon  Goal INR Range:  2/3Duration of Anticoagulation Therapy:      Lab Draw:  Product patient has at home: warfarin 5  mg    Recent INR Results:  Lab Results   Component Value Date    INR 2.93 (H) 12/29/2020    INR 3.46 (H) 12/22/2020    INR 3.28 (H) 12/07/2020    INR 1.26 (H) 11/09/2020       INR Summary                          Warfarin regimen (mg)  Date INR A/P Sun Mon Tue Wed Thu Fri Sat Mg/wk   6/9/20  2.2  At goal 10mg 10mg 10mg 10mg 7.5mg 10mg 10mg 67.5mg   2/6/20 1.7 Below goal           1/2/20 1.1 Below goal           9/18/19   2.2 At goal 7.5mg 7.5mg 7.5mg 7.5mg 10mg 7.5mg 7.5mg 55mg   8/28/19 1.0 Below goal 7.5mg 7.5mg 7.5mg 7.5mg 10mg 7.5mg 7.5mg 55mg   7/24/19 2.0 At goal 7.5mg 7.5mg 7.5mg 7.5mg 7.5mg 7.5mg 7.5mg 52.5   7/10/19 1.1 Below goal 7.5mg 7.5mg 7.5mg 7.5mg 7.5mg 7.5mg 7.5mg 52.5   6/10/19 2.6 At goal 5mg 7.5mg 7.5mg 7.5mg 7.5mg 7.5mg 7.5mg 50 mg   5/23/19 1.3 Below goal 7.5mg 7.5mg 7.5mg 7.5mg 7.5mg 7.5mg 7.5mg 52.5   5/14/19 1.1 Below goal 5mg 7.5mg 7.5mg 7.5mg 7.5mg 7.5mg 7.5mg 50mg   4/29/19 1.9 Below goal 7.5mg 7.5mg 7.5mg 7.5mg 7.5mg 5mg 7.5mg 50mg 4/15/19 3.9 Above goal 7.5mg 7.5mg 5mg 7.5mg 7.5mg 5mg 7.5mg 47.5   4/3/19 4.3 Above goals 7.5mg 7.5mg 7.5mg 7.5mg 7.5mg hold hold 37.5   3/25/19 6.6 Above goals           3/7/19 1.0 Below goal 10mg 10mg 10mg 10mg 10mg 10mg 10mg 70mg                                                   Addendum:  Patient confirmed to stay on the same  dose of 7.5 mg qd and to repeat his protime on Monday 1/13/20  Reviewed INR  At 2.9  at goal        [address], CVNP FNP

## 2021-01-04 RX ORDER — HYDRALAZINE HYDROCHLORIDE 10 MG/1
10 TABLET, FILM COATED ORAL 3 TIMES DAILY
Qty: 90 TABLET | Refills: 5 | Status: SHIPPED | OUTPATIENT
Start: 2021-01-04 | End: 2021-07-02 | Stop reason: SDUPTHER

## 2021-01-04 NOTE — TELEPHONE ENCOUNTER
Requested Prescriptions     Pending Prescriptions Disp Refills    hydrALAZINE (APRESOLINE) 10 MG tablet 90 tablet 3     Sig: Take 1 tablet by mouth 3 times daily          Number: 90    Refills: 3    Last Office Visit: 10/23/2020     Next Office Visit: 1/26/2021

## 2021-01-12 DIAGNOSIS — I48.21 PERMANENT ATRIAL FIBRILLATION (HCC): ICD-10-CM

## 2021-01-12 DIAGNOSIS — Z79.899 ENCOUNTER FOR LONG-TERM (CURRENT) USE OF OTHER MEDICATIONS: ICD-10-CM

## 2021-01-12 LAB
INR BLD: 2.12 (ref 0.86–1.14)
PROTHROMBIN TIME: 24.8 SEC (ref 10–13.2)

## 2021-01-13 ENCOUNTER — ANTI-COAG VISIT (OUTPATIENT)
Dept: CARDIOLOGY CLINIC | Age: 71
End: 2021-01-13

## 2021-01-14 ENCOUNTER — ANTI-COAG VISIT (OUTPATIENT)
Dept: CARDIOLOGY CLINIC | Age: 71
End: 2021-01-14

## 2021-01-14 ENCOUNTER — TELEPHONE (OUTPATIENT)
Dept: CARDIOLOGY CLINIC | Age: 71
End: 2021-01-14

## 2021-01-14 NOTE — PROGRESS NOTES
ANTICOAGULATION MONITORING    Dukesbeba Huddleston, 1950      Anticoagulation Indication PAF non valvular  Persistent range 2-3   Referring Physician:   Cata Huynh  Goal INR Range:  2/3Duration of Anticoagulation Therapy:      Lab Draw:  Product patient has at home: warfarin 5  mg    Recent INR Results:  Lab Results   Component Value Date    INR 2.12 (H) 01/12/2021    INR 2.93 (H) 12/29/2020    INR 3.46 (H) 12/22/2020    INR 3.28 (H) 12/07/2020       INR Summary                          Warfarin regimen (mg)  Date INR A/P Sun Mon Tue Wed Thu Fri Sat Mg/wk   1/14/21 2.2 At goal 7.5 7.5 7.5 7.5 7.5 7.5 7.5 52.5   12/2220 3.4 Above goal 7.5 10 7.5 7.5 7.5 7.5 7.5 55mg   12/7/20 3.2 Above goal 7.5 7.5 7.5 7.5 7.5 7.5 7.5 52.5   9/2/20 2.05 At goal, no change 7.5 7.5 7.5 7.5 7.5 7.5 7.5 52.5   7/11/19 1.1 Below goal 7.5mg 7.5mg 7.5mg 7.5mg 7.5mg 7.5mg 7.5mg 52.5   6/10/19 2.6 At goal 5mg 7.5mg 7.5mg 7.5mg 7.5mg 7.5mg 7.5mg 50 mg   5/23/19 1.3 Below goal 7.5mg 7.5mg 7.5mg 7.5mg 7.5mg 7.5mg 7.5mg 52.5   5/14/19 1.1 Below goal 5mg 7.5mg 7.5mg 7.5mg 7.5mg 7.5mg 7.5mg 50mg   4/29/19 1.9 Below goal 7.5mg 7.5mg 7.5mg 7.5mg 7.5mg 5mg 7.5mg 50mg   4/15/19 3.9 Above goal 7.5mg 7.5mg 5mg 7.5mg 7.5mg 5mg 7.5mg 47.5   4/3/19 4.3 Above goals 7.5mg 7.5mg 7.5mg 7.5mg 7.5mg hold hold 37.5   3/25/19 6.6 Above goals           3/7/19 1.0 Below goal 10mg 10mg 10mg 10mg 10mg 10mg 10mg 70mg                                                ANTICOAGULATION MONITORING    Marcin Huddleston, 1950      Anticoagulation Indication PAF non valvular  Persistent   Referring Physician:   Cata Huynh  Goal INR Range:  2/3Duration of Anticoagulation Therapy:      Lab Draw:  Product patient has at home: warfarin 5  mg    Recent INR Results:  Lab Results   Component Value Date    INR 2.12 (H) 01/12/2021    INR 2.93 (H) 12/29/2020    INR 3.46 (H) 12/22/2020    INR 3.28 (H) 12/07/2020       INR Summary                          Warfarin regimen (mg) Date INR A/P Sun Mon Tue Wed Thu Fri Sat Mg/wk   6/9/20  2.2  At goal 10mg 10mg 10mg 10mg 7.5mg 10mg 10mg 67.5mg   2/6/20 1.7 Below goal           1/2/20 1.1 Below goal           9/18/19   2.2 At goal 7.5mg 7.5mg 7.5mg 7.5mg 10mg 7.5mg 7.5mg 55mg   8/28/19 1.0 Below goal 7.5mg 7.5mg 7.5mg 7.5mg 10mg 7.5mg 7.5mg 55mg   7/24/19 2.0 At goal 7.5mg 7.5mg 7.5mg 7.5mg 7.5mg 7.5mg 7.5mg 52.5   7/10/19 1.1 Below goal 7.5mg 7.5mg 7.5mg 7.5mg 7.5mg 7.5mg 7.5mg 52.5   6/10/19 2.6 At goal 5mg 7.5mg 7.5mg 7.5mg 7.5mg 7.5mg 7.5mg 50 mg   5/23/19 1.3 Below goal 7.5mg 7.5mg 7.5mg 7.5mg 7.5mg 7.5mg 7.5mg 52.5   5/14/19 1.1 Below goal 5mg 7.5mg 7.5mg 7.5mg 7.5mg 7.5mg 7.5mg 50mg   4/29/19 1.9 Below goal 7.5mg 7.5mg 7.5mg 7.5mg 7.5mg 5mg 7.5mg 50mg   4/15/19 3.9 Above goal 7.5mg 7.5mg 5mg 7.5mg 7.5mg 5mg 7.5mg 47.5   4/3/19 4.3 Above goals 7.5mg 7.5mg 7.5mg 7.5mg 7.5mg hold hold 37.5   3/25/19 6.6 Above goals           3/7/19 1.0 Below goal 10mg 10mg 10mg 10mg 10mg 10mg 10mg 70mg                                                   Addendum:  Patient confirmed to decrease his dose to 7.5 mg qd and to repeat his protime on 2/12*/21  Reviewed INR  At 2.2  At  goal  lmom same dose of 7.5 mg daily repeat protime on 2/12/21      Angel Callahan JOSE, CVARSEN FN              [address], 1950      Anticoagulation Indication PAF non valvular  Persistent   Referring Physician:   Mary Anne Lab  Goal INR Range:  2/3Duration of Anticoagulation Therapy:      Lab Draw:  Product patient has at home: warfarin 5  mg    Recent INR Results:  Lab Results   Component Value Date    INR 2.12 (H) 01/12/2021    INR 2.93 (H) 12/29/2020    INR 3.46 (H) 12/22/2020    INR 3.28 (H) 12/07/2020       INR Summary                          Warfarin regimen (mg)  Date INR A/P Sun Mon Tue Wed Thu Fri Sat Mg/wk   1/14/221 2.2  At goal 7.5 7.5 7.5 7.5 7.5 7.5 7.5 52.5   12/29/20 2.9 At goal 7.5 7.5 7.5 7.5 7.5 7.5 7.5 52.5mg 12/2220 3.4 Above goal 7.5 10 7.5 7.5 7.5 7.5 7.5 55mg   12/7/20 3.2 Above goal 7.5 7.5 7.5 7.5 7.5 7.5 7.5 52.5   9/2/20 2.05 At goal, no change 7.5 7.5 7.5 7.5 7.5 7.5 7.5 52.5   7/11/19 1.1 Below goal 7.5mg 7.5mg 7.5mg 7.5mg 7.5mg 7.5mg 7.5mg 52.5   6/10/19 2.6 At goal 5mg 7.5mg 7.5mg 7.5mg 7.5mg 7.5mg 7.5mg 50 mg   5/23/19 1.3 Below goal 7.5mg 7.5mg 7.5mg 7.5mg 7.5mg 7.5mg 7.5mg 52.5   5/14/19 1.1 Below goal 5mg 7.5mg 7.5mg 7.5mg 7.5mg 7.5mg 7.5mg 50mg   4/29/19 1.9 Below goal 7.5mg 7.5mg 7.5mg 7.5mg 7.5mg 5mg 7.5mg 50mg   4/15/19 3.9 Above goal 7.5mg 7.5mg 5mg 7.5mg 7.5mg 5mg 7.5mg 47.5   4/3/19 4.3 Above goals 7.5mg 7.5mg 7.5mg 7.5mg 7.5mg hold hold 37.5   3/25/19 6.6 Above goals           3/7/19 1.0 Below goal 10mg 10mg 10mg 10mg 10mg 10mg 10mg 70mg                                                ANTICOAGULATION MONITORING    Rennis Favre, 1950      Anticoagulation Indication PAF non valvular  Persistent   Referring Physician:   Clint Perez  Goal INR Range:  2/3Duration of Anticoagulation Therapy:      Lab Draw:  Product patient has at home: warfarin 5  mg    Recent INR Results:  Lab Results   Component Value Date    INR 2.12 (H) 01/12/2021    INR 2.93 (H) 12/29/2020    INR 3.46 (H) 12/22/2020    INR 3.28 (H) 12/07/2020       INR Summary                          Warfarin regimen (mg)  Date INR A/P Sun Mon Tue Wed Thu Fri Sat Mg/wk   6/9/20  2.2  At goal 10mg 10mg 10mg 10mg 7.5mg 10mg 10mg 67.5mg   2/6/20 1.7 Below goal           1/2/20 1.1 Below goal           9/18/19   2.2 At goal 7.5mg 7.5mg 7.5mg 7.5mg 10mg 7.5mg 7.5mg 55mg   8/28/19 1.0 Below goal 7.5mg 7.5mg 7.5mg 7.5mg 10mg 7.5mg 7.5mg 55mg   7/24/19 2.0 At goal 7.5mg 7.5mg 7.5mg 7.5mg 7.5mg 7.5mg 7.5mg 52.5   7/10/19 1.1 Below goal 7.5mg 7.5mg 7.5mg 7.5mg 7.5mg 7.5mg 7.5mg 52.5   6/10/19 2.6 At goal 5mg 7.5mg 7.5mg 7.5mg 7.5mg 7.5mg 7.5mg 50 mg   5/23/19 1.3 Below goal 7.5mg 7.5mg 7.5mg 7.5mg 7.5mg 7.5mg 7.5mg 52.5

## 2021-01-15 DIAGNOSIS — Z95.0 PACEMAKER: ICD-10-CM

## 2021-01-15 DIAGNOSIS — I48.0 PAROXYSMAL ATRIAL FIBRILLATION (HCC): ICD-10-CM

## 2021-01-15 DIAGNOSIS — I10 ESSENTIAL HYPERTENSION: ICD-10-CM

## 2021-01-18 RX ORDER — WARFARIN SODIUM 5 MG/1
TABLET ORAL
Qty: 60 TABLET | Refills: 1 | Status: SHIPPED | OUTPATIENT
Start: 2021-01-18 | End: 2021-06-30 | Stop reason: SDUPTHER

## 2021-02-16 ENCOUNTER — ANTI-COAG VISIT (OUTPATIENT)
Dept: CARDIOLOGY CLINIC | Age: 71
End: 2021-02-16
Payer: MEDICARE

## 2021-02-16 ENCOUNTER — TELEPHONE (OUTPATIENT)
Dept: CARDIOLOGY CLINIC | Age: 71
End: 2021-02-16

## 2021-02-16 DIAGNOSIS — I48.11 LONGSTANDING PERSISTENT ATRIAL FIBRILLATION (HCC): ICD-10-CM

## 2021-02-16 LAB — INR BLD: 3.6

## 2021-02-16 PROCEDURE — 93793 ANTICOAG MGMT PT WARFARIN: CPT | Performed by: NURSE PRACTITIONER

## 2021-02-16 NOTE — PROGRESS NOTES
ANTICOAGULATION MONITORING    Winnie Rendon, 1950      Anticoagulation Indication PAF non valvular  Persistent range 2-3   Referring Physician:   Nola Beckett  Goal INR Range:  2/3Duration of Anticoagulation Therapy:      Lab Draw:  Product patient has at home: warfarin 5  mg    Recent INR Results:  Lab Results   Component Value Date    INR 3.60 02/16/2021    INR 2.12 (H) 01/12/2021    INR 2.93 (H) 12/29/2020    INR 3.46 (H) 12/22/2020       INR Summary                          Warfarin regimen (mg)  Date INR A/P Sun Mon Tue Wed Thu Fri Sat Mg/wk   2/16/21 3.6 Above goal, decrease by 2.5 mg 7.5 7.5 5 7.5 7.5 7.5 7.5 50   1/14/21 2.2 At goal 7.5 7.5 7.5 7.5 7.5 7.5 7.5 52.5   12/22/20 3.4 Above goal 7.5 10 7.5 7.5 7.5 7.5 7.5 55mg   12/7/20 3.2 Above goal 7.5 7.5 7.5 7.5 7.5 7.5 7.5 52.5   9/2/20 2.05 At goal, no change 7.5 7.5 7.5 7.5 7.5 7.5 7.5 52.5   7/11/19 1.1 Below goal 7.5mg 7.5mg 7.5mg 7.5mg 7.5mg 7.5mg 7.5mg 52.5   6/10/19 2.6 At goal 5mg 7.5mg 7.5mg 7.5mg 7.5mg 7.5mg 7.5mg 50 mg   5/23/19 1.3 Below goal 7.5mg 7.5mg 7.5mg 7.5mg 7.5mg 7.5mg 7.5mg 52.5   5/14/19 1.1 Below goal 5mg 7.5mg 7.5mg 7.5mg 7.5mg 7.5mg 7.5mg 50mg   4/29/19 1.9 Below goal 7.5mg 7.5mg 7.5mg 7.5mg 7.5mg 5mg 7.5mg 50mg   4/15/19 3.9 Above goal 7.5mg 7.5mg 5mg 7.5mg 7.5mg 5mg 7.5mg 47.5   4/3/19 4.3 Above goals 7.5mg 7.5mg 7.5mg 7.5mg 7.5mg hold hold 37.5   3/25/19 6.6 Above goals           3/7/19 1.0 Below goal 10mg 10mg 10mg 10mg 10mg 10mg 10mg 70mg         Recheck INR: 2/23/21    Spoke with Joanne GUTHRIE and went over dosing instructions and when to recheck. Verbalized understanding.        Addendum:  Reviewed documentation and plan of care from RN  Agree with above  Fernanda Bryant NP

## 2021-02-16 NOTE — TELEPHONE ENCOUNTER
Joanne from [facility] called to report the patient's     INR: 3.6    Drawn on 02/16/21    Taking 7.5 mg daily     No Diet change     Please call Joanne back at [phone] to advise. If she does not answer please leaving verbal orders on her voicemail.     Bib please advise

## 2021-02-22 ENCOUNTER — ANTI-COAG VISIT (OUTPATIENT)
Dept: CARDIOLOGY CLINIC | Age: 71
End: 2021-02-22
Payer: MEDICARE

## 2021-02-22 ENCOUNTER — TELEPHONE (OUTPATIENT)
Dept: CARDIOLOGY CLINIC | Age: 71
End: 2021-02-22

## 2021-02-22 DIAGNOSIS — I48.11 LONGSTANDING PERSISTENT ATRIAL FIBRILLATION (HCC): ICD-10-CM

## 2021-02-22 LAB — INR BLD: 4.8

## 2021-02-22 PROCEDURE — 93793 ANTICOAG MGMT PT WARFARIN: CPT | Performed by: NURSE PRACTITIONER

## 2021-02-22 NOTE — PROGRESS NOTES
ANTICOAGULATION MONITORING    Emmanuel Ba, 1950      Anticoagulation Indication PAF non valvular  Persistent range 2-3   Referring Physician:   Wendi Robles  Goal INR Range:  2/3Duration of Anticoagulation Therapy:      Lab Draw:  Product patient has at home: warfarin 5  mg    Recent INR Results:  Lab Results   Component Value Date    INR 4.80 02/22/2021    INR 3.60 02/16/2021    INR 2.12 (H) 01/12/2021    INR 2.93 (H) 12/29/2020       INR Summary                          Warfarin regimen (mg)  Date INR A/P Sun Mon Tue Wed Thu Fri Sat Mg/wk   2/22/21  4.8  Above goal 7.5 Hold  5 7.5 7.5 7.5` 7.5      2/16/21 3.6 Above goal, decrease by 2.5 mg 7.5 7.5 5 7.5 7.5 7.5 7.5 50   1/14/21 2.2 At goal 7.5 7.5 7.5 7.5 7.5 7.5 7.5 52.5   12/22/20 3.4 Above goal 7.5 10 7.5 7.5 7.5 7.5 7.5 55mg   12/7/20 3.2 Above goal 7.5 7.5 7.5 7.5 7.5 7.5 7.5 52.5   9/2/20 2.05 At goal, no change 7.5 7.5 7.5 7.5 7.5 7.5 7.5 52.5   7/11/19 1.1 Below goal 7.5mg 7.5mg 7.5mg 7.5mg 7.5mg 7.5mg 7.5mg 52.5   6/10/19 2.6 At goal 5mg 7.5mg 7.5mg 7.5mg 7.5mg 7.5mg 7.5mg 50 mg   5/23/19 1.3 Below goal 7.5mg 7.5mg 7.5mg 7.5mg 7.5mg 7.5mg 7.5mg 52.5   5/14/19 1.1 Below goal 5mg 7.5mg 7.5mg 7.5mg 7.5mg 7.5mg 7.5mg 50mg   4/29/19 1.9 Below goal 7.5mg 7.5mg 7.5mg 7.5mg 7.5mg 5mg 7.5mg 50mg   4/15/19 3.9 Above goal 7.5mg 7.5mg 5mg 7.5mg 7.5mg 5mg 7.5mg 47.5   4/3/19 4.3 Above goals 7.5mg 7.5mg 7.5mg 7.5mg 7.5mg hold hold 37.5   3/25/19 6.6 Above goals           3/7/19 1.0 Below goal 10mg 10mg 10mg 10mg 10mg 10mg 10mg 70mg         Recheck INR: 2/25/21    Spoke with [facility] and patient will hold today dose and recheck on 2/25/21    Reviewed INR   At goal / not at goal INR   Next date blood to be drawn      Lety Danielson, CVNP FNP          Addendum:  Reviewed documentation and plan of care from RN  Agree with above  Sandhya Floor NP

## 2021-02-22 NOTE — TELEPHONE ENCOUNTER
Hannah with [address] called with PT/INR for patient of 57.5 and 4.8. Patient is taking 7.5 mg daily. Her number is [phone] if questions.

## 2021-02-26 ENCOUNTER — ANTI-COAG VISIT (OUTPATIENT)
Dept: CARDIOLOGY CLINIC | Age: 71
End: 2021-02-26
Payer: MEDICARE

## 2021-02-26 ENCOUNTER — TELEPHONE (OUTPATIENT)
Dept: CARDIOLOGY CLINIC | Age: 71
End: 2021-02-26

## 2021-02-26 ENCOUNTER — ANTI-COAG VISIT (OUTPATIENT)
Dept: CARDIOLOGY CLINIC | Age: 71
End: 2021-02-26

## 2021-02-26 DIAGNOSIS — I48.11 LONGSTANDING PERSISTENT ATRIAL FIBRILLATION (HCC): ICD-10-CM

## 2021-02-26 DIAGNOSIS — I42.9 CARDIOMYOPATHY, UNSPECIFIED TYPE (HCC): ICD-10-CM

## 2021-02-26 PROCEDURE — 93793 ANTICOAG MGMT PT WARFARIN: CPT | Performed by: NURSE PRACTITIONER

## 2021-02-26 NOTE — PROGRESS NOTES
ANTICOAGULATION MONITORING    Matt Adams, 1950      Anticoagulation Indication PAF non valvular  Persistent range 2-3   Referring Physician:   Pamela Clayton  Goal INR Range:  2/3Duration of Anticoagulation Therapy:      Lab Draw:  Product patient has at home: warfarin 5  mg    Recent INR Results:  Lab Results   Component Value Date    INR 3.3 (H) 02/25/2021    INR 4.80 02/22/2021    INR 3.60 02/16/2021    INR 2.12 (H) 01/12/2021       INR Summary                          Warfarin regimen (mg)  Date INR A/P Sun Mon Tue Wed Thu Fri Sat Mg/wk   2/25/21 3.3 Above goal 7.5 7.5 7.5 7.5 7.5 7.5 5 50mg   2/22/21  4.8  Above goal 7.5 Hold  5 7.5 7.5 7.5` 7.5      2/16/21 3.6 Above goal, decrease by 2.5 mg 7.5 7.5 5 7.5 7.5 7.5 7.5 50   1/14/21 2.2 At goal 7.5 7.5 7.5 7.5 7.5 7.5 7.5 52.5   12/22/20 3.4 Above goal 7.5 10 7.5 7.5 7.5 7.5 7.5 55mg   12/7/20 3.2 Above goal 7.5 7.5 7.5 7.5 7.5 7.5 7.5 52.5   9/2/20 2.05 At goal, no change 7.5 7.5 7.5 7.5 7.5 7.5 7.5 52.5   7/11/19 1.1 Below goal 7.5mg 7.5mg 7.5mg 7.5mg 7.5mg 7.5mg 7.5mg 52.5   6/10/19 2.6 At goal 5mg 7.5mg 7.5mg 7.5mg 7.5mg 7.5mg 7.5mg 50 mg   5/23/19 1.3 Below goal 7.5mg 7.5mg 7.5mg 7.5mg 7.5mg 7.5mg 7.5mg 52.5   5/14/19 1.1 Below goal 5mg 7.5mg 7.5mg 7.5mg 7.5mg 7.5mg 7.5mg 50mg   4/29/19 1.9 Below goal 7.5mg 7.5mg 7.5mg 7.5mg 7.5mg 5mg 7.5mg 50mg   4/15/19 3.9 Above goal 7.5mg 7.5mg 5mg 7.5mg 7.5mg 5mg 7.5mg 47.5   4/3/19 4.3 Above goals 7.5mg 7.5mg 7.5mg 7.5mg 7.5mg hold hold 37.5   3/25/19 6.6 Above goals           3/7/19 1.0 Below goal 10mg 10mg 10mg 10mg 10mg 10mg 10mg 70mg         Recheck INR: 2/25/21    Spoke with [facility] and patient will take 5mg today  and recheck on 3/4/21    Reviewed INR     Gopi Corona APRN, CVNP FNP

## 2021-03-04 ENCOUNTER — TELEPHONE (OUTPATIENT)
Dept: CARDIOLOGY CLINIC | Age: 71
End: 2021-03-04

## 2021-03-04 NOTE — TELEPHONE ENCOUNTER
Cheryl with [facility] calling in with pt inr  4.6       Please call  [phone]

## 2021-03-05 ENCOUNTER — ANTI-COAG VISIT (OUTPATIENT)
Dept: CARDIOLOGY CLINIC | Age: 71
End: 2021-03-05
Payer: MEDICARE

## 2021-03-05 DIAGNOSIS — I48.11 LONGSTANDING PERSISTENT ATRIAL FIBRILLATION (HCC): ICD-10-CM

## 2021-03-05 LAB — INR BLD: 4.6

## 2021-03-05 PROCEDURE — 93793 ANTICOAG MGMT PT WARFARIN: CPT | Performed by: NURSE PRACTITIONER

## 2021-03-05 NOTE — PROGRESS NOTES
ANTICOAGULATION MONITORING    [facility], 1950      Anticoagulation Indication PAF non valvular  Persistent range 2-3   Referring Physician:   Umm Ellis  Goal INR Range:  2/3Duration of Anticoagulation Therapy:      Lab Draw:  Product patient has at home: warfarin 5  mg    Recent INR Results:  Lab Results   Component Value Date    INR 4.60 03/05/2021    INR 3.3 (H) 02/25/2021    INR 4.80 02/22/2021    INR 3.60 02/16/2021       INR Summary                          Warfarin regimen (mg)  Date INR A/P Sun Mon Tue Wed Thu Fri Sat Mg/wk   3/4/21 4.6  Above goal 7.5 7.5 5 7.5 7.5 5 7.5 47.5   2/25/21 3.3 Above goal 7.5 7.5 7.5 7.5 7.5 7.5 5 50mg   2/22/21  4.8  Above goal 7.5 Hold  5 7.5 7.5 7.5` 7.5      2/16/21 3.6 Above goal, decrease by 2.5 mg 7.5 7.5 5 7.5 7.5 7.5 7.5 50   1/14/21 2.2 At goal 7.5 7.5 7.5 7.5 7.5 7.5 7.5 52.5   12/22/20 3.4 Above goal 7.5 10 7.5 7.5 7.5 7.5 7.5 55mg   12/7/20 3.2 Above goal 7.5 7.5 7.5 7.5 7.5 7.5 7.5 52.5   9/2/20 2.05 At goal, no change 7.5 7.5 7.5 7.5 7.5 7.5 7.5 52.5   7/11/19 1.1 Below goal 7.5mg 7.5mg 7.5mg 7.5mg 7.5mg 7.5mg 7.5mg 52.5   6/10/19 2.6 At goal 5mg 7.5mg 7.5mg 7.5mg 7.5mg 7.5mg 7.5mg 50 mg   5/23/19 1.3 Below goal 7.5mg 7.5mg 7.5mg 7.5mg 7.5mg 7.5mg 7.5mg 52.5   5/14/19 1.1 Below goal 5mg 7.5mg 7.5mg 7.5mg 7.5mg 7.5mg 7.5mg 50mg   4/29/19 1.9 Below goal 7.5mg 7.5mg 7.5mg 7.5mg 7.5mg 5mg 7.5mg 50mg   4/15/19 3.9 Above goal 7.5mg 7.5mg 5mg 7.5mg 7.5mg 5mg 7.5mg 47.5   4/3/19 4.3 Above goals 7.5mg 7.5mg 7.5mg 7.5mg 7.5mg hold hold 37.5   3/25/19 6.6 Above goals           3/7/19 1.0 Below goal 10mg 10mg 10mg 10mg 10mg 10mg 10mg 70mg         Recheck INR: 3/9/21    Spoke with Joanne GUTHRIE and patient will take 5mg on tu and fri ,7.5 all other days and recheck on 3/9/21. Patient held dose on 3/4/21 since inr was above goal at 4.6. Spoke with vn and patient to understand above goal results, no answer achieved .     Reviewed INR     Shoaib GARCIA, [place] FNP

## 2021-03-08 ENCOUNTER — TELEPHONE (OUTPATIENT)
Dept: CARDIOLOGY CLINIC | Age: 71
End: 2021-03-08

## 2021-03-08 DIAGNOSIS — I10 ESSENTIAL HYPERTENSION: ICD-10-CM

## 2021-03-08 DIAGNOSIS — I48.91 ATRIAL FIBRILLATION, UNSPECIFIED TYPE (HCC): ICD-10-CM

## 2021-03-08 DIAGNOSIS — I42.9 CARDIOMYOPATHY, UNSPECIFIED TYPE (HCC): ICD-10-CM

## 2021-03-08 RX ORDER — AMIODARONE HYDROCHLORIDE 200 MG/1
TABLET ORAL
Qty: 30 TABLET | Refills: 2 | Status: SHIPPED | OUTPATIENT
Start: 2021-03-08 | End: 2021-03-09 | Stop reason: SDUPTHER

## 2021-03-08 NOTE — TELEPHONE ENCOUNTER
Spoke to patient. Informed him that his device is functioning normal.  No alerts/observations of  impedance/thresholds out of range. Asked patient if he is having any pain/stimulation on his left side or below left breast?  He stated no. The pain is around the generator. It is a constant dull/annoying pain. Less intense today. Stated he carried some bags the other day and may have aggravated/strained a muscle. Asked patient to monitor the pain. If it is not better by tomorrow or lessening up, to call the office first thing in morning and come in for a device check and possible OV w/NP. Patient verbalized understanding.

## 2021-03-09 ENCOUNTER — TELEPHONE (OUTPATIENT)
Dept: CARDIOLOGY CLINIC | Age: 71
End: 2021-03-09

## 2021-03-09 DIAGNOSIS — I48.0 PAROXYSMAL ATRIAL FIBRILLATION (HCC): Primary | ICD-10-CM

## 2021-03-09 DIAGNOSIS — I10 ESSENTIAL HYPERTENSION: ICD-10-CM

## 2021-03-09 DIAGNOSIS — I48.91 ATRIAL FIBRILLATION, UNSPECIFIED TYPE (HCC): ICD-10-CM

## 2021-03-09 DIAGNOSIS — I42.9 CARDIOMYOPATHY, UNSPECIFIED TYPE (HCC): ICD-10-CM

## 2021-03-09 RX ORDER — AMIODARONE HYDROCHLORIDE 200 MG/1
TABLET ORAL
Qty: 90 TABLET | Refills: 3 | Status: SHIPPED | OUTPATIENT
Start: 2021-03-09 | End: 2022-05-25 | Stop reason: SDUPTHER

## 2021-03-09 NOTE — TELEPHONE ENCOUNTER
Requested Prescriptions     Pending Prescriptions Disp Refills    amiodarone (CORDARONE) 200 MG tablet 90 tablet 3     Sig: Take one tablet by mouth daily              Last Office Visit: 10/23/2020     Next Office Visit: 3/16/2021       Last Labs: TSH 9/25/2020

## 2021-03-09 NOTE — TELEPHONE ENCOUNTER
Chester Forte unable to reach patient to get INR. She will try to get it another day this week.   Cheryl with [employer]

## 2021-03-10 LAB — INR BLD: 3

## 2021-03-10 NOTE — TELEPHONE ENCOUNTER
Cheryl with [employer] called back. Patient's INR is 3.0 today. She said to call her back with instructions and when to draw next INR. She can be reached at [phone].

## 2021-03-10 NOTE — TELEPHONE ENCOUNTER
ANTICOAGULATION MONITORING    Ochoa Oliver, 1950      Anticoagulation Indication PAF non valvular  Persistent range 2-3   Referring Physician:   Zayra Quinones  Goal INR Range:  2/3Duration of Anticoagulation Therapy:      Lab Draw:  Product patient has at home: warfarin 5  mg    Recent INR Results:  Lab Results   Component Value Date    INR 3.00 03/10/2021    INR 4.60 03/05/2021    INR 3.3 (H) 02/25/2021    INR 4.80 02/22/2021       INR Summary                          Warfarin regimen (mg)  Date INR A/P Sun Mon Tue Wed Thu Fri Sat Mg/wk   3/10/21 3.0 At goal  7.5 7.5 5 7.5 7.5 5 7.5 47.5   3/4/21 4.6  Above goal 7.5 7.5 5 7.5 7.5 5 7.5 47.5   2/25/21 3.3 Above goal 7.5 7.5 7.5 7.5 7.5 7.5 5 50mg   2/22/21  4.8  Above goal 7.5 Hold  5 7.5 7.5 7.5` 7.5      2/16/21 3.6 Above goal, decrease by 2.5 mg 7.5 7.5 5 7.5 7.5 7.5 7.5 50   1/14/21 2.2 At goal 7.5 7.5 7.5 7.5 7.5 7.5 7.5 52.5   12/22/20 3.4 Above goal 7.5 10 7.5 7.5 7.5 7.5 7.5 55mg   12/7/20 3.2 Above goal 7.5 7.5 7.5 7.5 7.5 7.5 7.5 52.5   9/2/20 2.05 At goal, no change 7.5 7.5 7.5 7.5 7.5 7.5 7.5 52.5   7/11/19 1.1 Below goal 7.5mg 7.5mg 7.5mg 7.5mg 7.5mg 7.5mg 7.5mg 52.5   6/10/19 2.6 At goal 5mg 7.5mg 7.5mg 7.5mg 7.5mg 7.5mg 7.5mg 50 mg   5/23/19 1.3 Below goal 7.5mg 7.5mg 7.5mg 7.5mg 7.5mg 7.5mg 7.5mg 52.5   5/14/19 1.1 Below goal 5mg 7.5mg 7.5mg 7.5mg 7.5mg 7.5mg 7.5mg 50mg   4/29/19 1.9 Below goal 7.5mg 7.5mg 7.5mg 7.5mg 7.5mg 5mg 7.5mg 50mg   4/15/19 3.9 Above goal 7.5mg 7.5mg 5mg 7.5mg 7.5mg 5mg 7.5mg 47.5   4/3/19 4.3 Above goals 7.5mg 7.5mg 7.5mg 7.5mg 7.5mg hold hold 37.5   3/25/19 6.6 Above goals           3/7/19 1.0 Below goal 10mg 10mg 10mg 10mg 10mg 10mg 10mg 70mg         Recheck INR: 3/17/21    Spoke with [facility] and patient will continue to  take 5mg on tu and fri ,7.5 all other days and recheck on 3/17/21.     Reviewed INR     Cherl Cabot APRN, CVNP FNP

## 2021-03-12 ENCOUNTER — APPOINTMENT (OUTPATIENT)
Dept: GENERAL RADIOLOGY | Age: 71
End: 2021-03-12
Payer: MEDICARE

## 2021-03-12 ENCOUNTER — HOSPITAL ENCOUNTER (EMERGENCY)
Age: 71
Discharge: HOME | End: 2021-03-12
Attending: EMERGENCY MEDICINE
Payer: MEDICARE

## 2021-03-12 VITALS
HEART RATE: 82 BPM | WEIGHT: 295.9 LBS | HEIGHT: 74 IN | SYSTOLIC BLOOD PRESSURE: 148 MMHG | RESPIRATION RATE: 16 BRPM | DIASTOLIC BLOOD PRESSURE: 118 MMHG | BODY MASS INDEX: 37.98 KG/M2 | TEMPERATURE: 97.8 F | OXYGEN SATURATION: 100 %

## 2021-03-12 DIAGNOSIS — I50.9 CONGESTIVE HEART FAILURE, UNSPECIFIED HF CHRONICITY, UNSPECIFIED HEART FAILURE TYPE (HCC): ICD-10-CM

## 2021-03-12 DIAGNOSIS — R07.89 CHEST WALL PAIN: Primary | ICD-10-CM

## 2021-03-12 LAB
ANION GAP SERPL CALCULATED.3IONS-SCNC: 8 MMOL/L (ref 3–16)
BASOPHILS ABSOLUTE: 0 K/UL (ref 0–0.2)
BASOPHILS RELATIVE PERCENT: 0.7 %
BUN BLDV-MCNC: 34 MG/DL (ref 7–20)
CALCIUM SERPL-MCNC: 9.2 MG/DL (ref 8.3–10.6)
CHLORIDE BLD-SCNC: 105 MMOL/L (ref 99–110)
CO2: 22 MMOL/L (ref 21–32)
CREAT SERPL-MCNC: 3.3 MG/DL (ref 0.8–1.3)
EKG ATRIAL RATE: 84 BPM
EKG DIAGNOSIS: NORMAL
EKG Q-T INTERVAL: 432 MS
EKG QRS DURATION: 162 MS
EKG QTC CALCULATION (BAZETT): 498 MS
EKG R AXIS: -70 DEGREES
EKG T AXIS: -78 DEGREES
EKG VENTRICULAR RATE: 80 BPM
EOSINOPHILS ABSOLUTE: 0.2 K/UL (ref 0–0.6)
EOSINOPHILS RELATIVE PERCENT: 2.3 %
GFR AFRICAN AMERICAN: 23
GFR NON-AFRICAN AMERICAN: 19
GLUCOSE BLD-MCNC: 112 MG/DL (ref 70–99)
HCT VFR BLD CALC: 36.6 % (ref 40.5–52.5)
HEMOGLOBIN: 12 G/DL (ref 13.5–17.5)
INR BLD: 2.59 (ref 0.86–1.14)
LYMPHOCYTES ABSOLUTE: 1.7 K/UL (ref 1–5.1)
LYMPHOCYTES RELATIVE PERCENT: 25.3 %
MCH RBC QN AUTO: 31.4 PG (ref 26–34)
MCHC RBC AUTO-ENTMCNC: 32.8 G/DL (ref 31–36)
MCV RBC AUTO: 95.7 FL (ref 80–100)
MONOCYTES ABSOLUTE: 0.7 K/UL (ref 0–1.3)
MONOCYTES RELATIVE PERCENT: 9.7 %
NEUTROPHILS ABSOLUTE: 4.2 K/UL (ref 1.7–7.7)
NEUTROPHILS RELATIVE PERCENT: 62 %
PDW BLD-RTO: 15.8 % (ref 12.4–15.4)
PLATELET # BLD: 192 K/UL (ref 135–450)
PMV BLD AUTO: 8.3 FL (ref 5–10.5)
POTASSIUM REFLEX MAGNESIUM: 4.7 MMOL/L (ref 3.5–5.1)
PRO-BNP: 7803 PG/ML (ref 0–124)
PROTHROMBIN TIME: 30.3 SEC (ref 10–13.2)
RBC # BLD: 3.82 M/UL (ref 4.2–5.9)
SODIUM BLD-SCNC: 135 MMOL/L (ref 136–145)
TROPONIN: 0.04 NG/ML
TROPONIN: 0.05 NG/ML
WBC # BLD: 6.8 K/UL (ref 4–11)

## 2021-03-12 PROCEDURE — 83880 ASSAY OF NATRIURETIC PEPTIDE: CPT

## 2021-03-12 PROCEDURE — 71045 X-RAY EXAM CHEST 1 VIEW: CPT

## 2021-03-12 PROCEDURE — 93005 ELECTROCARDIOGRAM TRACING: CPT | Performed by: EMERGENCY MEDICINE

## 2021-03-12 PROCEDURE — 99284 EMERGENCY DEPT VISIT MOD MDM: CPT

## 2021-03-12 PROCEDURE — 6370000000 HC RX 637 (ALT 250 FOR IP): Performed by: EMERGENCY MEDICINE

## 2021-03-12 PROCEDURE — 85025 COMPLETE CBC W/AUTO DIFF WBC: CPT

## 2021-03-12 PROCEDURE — 84484 ASSAY OF TROPONIN QUANT: CPT

## 2021-03-12 PROCEDURE — 80048 BASIC METABOLIC PNL TOTAL CA: CPT

## 2021-03-12 PROCEDURE — 85610 PROTHROMBIN TIME: CPT

## 2021-03-12 RX ORDER — METHOCARBAMOL 500 MG/1
750 TABLET, FILM COATED ORAL ONCE
Status: COMPLETED | OUTPATIENT
Start: 2021-03-12 | End: 2021-03-12

## 2021-03-12 RX ORDER — ACETAMINOPHEN 325 MG/1
650 TABLET ORAL ONCE
Status: COMPLETED | OUTPATIENT
Start: 2021-03-12 | End: 2021-03-12

## 2021-03-12 RX ADMIN — ACETAMINOPHEN 650 MG: 325 TABLET ORAL at 07:15

## 2021-03-12 RX ADMIN — METHOCARBAMOL 750 MG: 500 TABLET ORAL at 07:15

## 2021-03-12 ASSESSMENT — PAIN DESCRIPTION - ORIENTATION: ORIENTATION: LEFT;UPPER;OUTER

## 2021-03-12 ASSESSMENT — PAIN SCALES - GENERAL
PAINLEVEL_OUTOF10: 7
PAINLEVEL_OUTOF10: 7

## 2021-03-12 ASSESSMENT — PAIN DESCRIPTION - LOCATION: LOCATION: CHEST

## 2021-03-12 NOTE — ED NOTES
Pt ambulated with pulse ox with out any difficulty. Pt O2 Sats never dropped below 96%. Pt denies any SOB while ambulating.       Pipe Bower RN  03/12/21 [id number]

## 2021-03-12 NOTE — ED PROVIDER NOTES
[address] [facility]          ATTENDING PHYSICIAN NOTE       Date of evaluation: 3/12/2021    Chief Complaint     Chest Pain      History of Present Illness     Mirlande Phoenix is a 73 y.o. male with history of CAD, A. Fib on warfarin, hypertension, pacemaker, multiple other comorbidities presenting for left-sided chest pain. Patient states 2 days ago he was lifting some heavy groceries and later in the day began to feel some soreness in his left lateral chest and arm. He states it is fairly constant but does wax and wane in severity. He describes it as an ache and soreness. Does not radiate into the chest itself or into the back. No pain at the site of his pacemaker, but is concerned about the pacemaker and its function. His wife was concerned there could be possible infection at the pacemaker site. He denies any fevers or cough, no nausea/vomiting, no abdominal pain. Review of Systems     Pertinent positive and negative findings as documented in the HPI. Otherwise all other systems were reviewed and were negative. Physical Exam     INITIAL VITALS: BP: (!) 164/124, Temp: 97.8 °F (36.6 °C), Pulse: 80, Resp: 17, SpO2: 100 %     Nursing note and vitals reviewed. General:  Adult male, alert and appropriately interactive. In no distress. HENT: Normocephalic and atraumatic. External ears normal. Nose appears normal externally. Eyes: Conjunctivae normal. No scleral icterus. Neck: Neck supple. No tracheal deviation present. CV: Normal rate. Regular rhythm. S1/S2 auscultated. No murmurs, gallops or rubs. Chest: Effort normal. Breath sounds clear to auscultation bilaterally. No wheezes. No rales or rhonchi. Tenderness over the superior aspect of the anterolateral chest wall overlying the pectoralis insertion near the coracoid. No tenderness overlying the pacemaker site, no edema, erythema, or warmth. Abdominal: Soft. No distension. No tenderness.  No rebound or guarding. No masses. No peritoneal signs. Musculoskeletal: No edema. No gross deformities. Neurological: Alert and appropriately interactive. Face symmetric, speech without dysarthria or obvious aphasia. Moving all extremities spontaneously. Skin: Warm, dry. No rash. No diaphoresis or erythema. Psychiatric: Calm and cooperative with appropriate mood and affect. Procedures   Procedures    MEDICAL DECISION MAKING     MDM: Sasha Pandey is a 73 y.o. male with history of CAD/cardiomyopathy, pacemaker, A. fib on warfarin presenting for left upper chest pain after lifting heavy groceries. On arrival, he is in no distress, vital signs reassuring. He has some tenderness near the coracoid process in the left anterior chest wall and pain with passive range of motion of the left shoulder, most suggestive of musculoskeletal etiology. However, given his comorbidities will obtain labs and chest x-ray. These are pending at signout. Patient signed out to the oncoming physician, Dr. Zan Tobias, will follow up on the results of the studies and determine the patient's ultimate disposition. Clinical Impression     No diagnosis found. Disposition     DISPOSITION          Misty Durand MD  7:15 AM                     Past Medical, Surgical, Family, and Social History     He has a past medical history of Atrial fibrillation ([address] CHF (congestive heart failure) ([address] CKD (chronic kidney disease) stage 3, GFR 30-59 ml/min, Fracture, Gout, Hypertension, and Pacemaker. He has a past surgical history that includes Cardiac catheterization (5-2004). His family history includes Heart Disease in his father and mother; High Blood Pressure in his brother, father, and mother. He reports that he is a non-smoker but has been exposed to tobacco smoke. He has never used smokeless tobacco. He reports current alcohol use. He reports current drug use.     Medications     Previous Medications    ALLOPURINOL (ZYLOPRIM) 300 MG TABLET Take 1 tablet by mouth daily    AMIODARONE (CORDARONE) 200 MG TABLET    Take one tablet by mouth daily    AMLODIPINE (NORVASC) 5 MG TABLET    TAKE ONE TABLET BY MOUTH DAILY    ASPIRIN 81 MG CHEWABLE TABLET    Take 1 tablet by mouth daily    CARVEDILOL (COREG) 25 MG TABLET    TAKE ONE TABLET BY MOUTH TWICE A DAY WITH MEALS    CHLORTHALIDONE (HYGROTON) 25 MG TABLET    Take 25 mg by mouth daily    HYDRALAZINE (APRESOLINE) 10 MG TABLET    Take 1 tablet by mouth 3 times daily    ISOSORBIDE MONONITRATE (IMDUR) 30 MG EXTENDED RELEASE TABLET    TAKE ONE TABLET BY MOUTH DAILY    LEVOTHYROXINE (SYNTHROID) 50 MCG TABLET    Take 50 mcg by mouth daily    MAGNESIUM OXIDE (MAG-OX) 400 (241.3 MG) MG TABS TABLET    TAKE ONE TABLET BY MOUTH TWICE A DAY    SILDENAFIL (VIAGRA) 100 MG TABLET    TAKE ONE TABLET BY MOUTH DAILY AS NEEDED FOR ERECTILE DYSFUNCTION    SODIUM BICARBONATE 650 MG TABLET    Take 1 tablet by mouth 2 times daily    WARFARIN (COUMADIN) 5 MG TABLET    TAKE TWO TABLETS BY MOUTH DAILY    WARFARIN (COUMADIN) 7.5 MG TABLET    Take 7.5 mg by mouth daily Saturday take 5 MG. Take 7.5mg on all other days. Allergies     He is allergic to peanut-containing drug products and penicillins. ED Course     Nursing Notes, Past Medical Hx, Past Surgical Hx, Social Hx,Allergies, and Family Hx were reviewed. Patient was given the following medications:  Orders Placed This Encounter   Medications    acetaminophen (TYLENOL) tablet 650 mg    methocarbamol (ROBAXIN) tablet 750 mg       Diagnostic Results     EKG   Ventricularly paced rhythm with rate of 80. Measurable intervals normal, axis left deviated due to pacing. Negative Sgarbossa criteria. Compared to previous, no significant change.       RECENT VITALS:  BP: (!) 145/111,Temp: 97.8 °F (36.6 °C), Pulse: 83, Resp: 18, SpO2: 100 %     RADIOLOGY:  XR CHEST PORTABLE    (Results Pending)       LABS:   Results for orders placed or performed during the hospital encounter of 03/12/21   CBC Auto Differential   Result Value Ref Range    WBC 6.8 4.0 - 11.0 K/uL    RBC 3.82 (L) 4.20 - 5.90 M/uL    Hemoglobin 12.0 (L) 13.5 - 17.5 g/dL    Hematocrit 36.6 (L) 40.5 - 52.5 %    MCV 95.7 80.0 - 100.0 fL    MCH 31.4 26.0 - 34.0 pg    MCHC 32.8 31.0 - 36.0 g/dL    RDW 15.8 (H) 12.4 - 15.4 %    Platelets 176 769 - 824 K/uL    MPV 8.3 5.0 - 10.5 fL    Neutrophils % 62.0 %    Lymphocytes % 25.3 %    Monocytes % 9.7 %    Eosinophils % 2.3 %    Basophils % 0.7 %    Neutrophils Absolute 4.2 1.7 - 7.7 K/uL    Lymphocytes Absolute 1.7 1.0 - 5.1 K/uL    Monocytes Absolute 0.7 0.0 - 1.3 K/uL    Eosinophils Absolute 0.2 0.0 - 0.6 K/uL    Basophils Absolute 0.0 0.0 - 0.2 K/uL       CONSULTS:  None    PATIENT REFERRED TO:  No follow-up provider specified.     DISCHARGE MEDICATIONS:  New Prescriptions    No medications on file          Torie Hall MD  03/12/21 6834

## 2021-03-12 NOTE — ED PROVIDER NOTES
[address] [facility] ENCOUNTER          ATTENDING PHYSICIAN NOTE       Date of evaluation: 3/12/2021    ADDENDUM:      Care of this patient was assumed from Dr. Manjit Vega. The patient was seen for Chest Pain  . The patient's initial evaluation and plan have been discussed with the prior provider who initially evaluated the patient. Nursing Notes, Past Medical Hx, Past Surgical Hx, Social Hx, Allergies, and Family Hx were all reviewed. Diagnostic Results       RADIOLOGY:  XR CHEST PORTABLE   Final Result      Mild thyromegaly and mild vascular congestion.           LABS:   Results for orders placed or performed during the hospital encounter of 03/12/21   CBC Auto Differential   Result Value Ref Range    WBC 6.8 4.0 - 11.0 K/uL    RBC 3.82 (L) 4.20 - 5.90 M/uL    Hemoglobin 12.0 (L) 13.5 - 17.5 g/dL    Hematocrit 36.6 (L) 40.5 - 52.5 %    MCV 95.7 80.0 - 100.0 fL    MCH 31.4 26.0 - 34.0 pg    MCHC 32.8 31.0 - 36.0 g/dL    RDW 15.8 (H) 12.4 - 15.4 %    Platelets 022 720 - 929 K/uL    MPV 8.3 5.0 - 10.5 fL    Neutrophils % 62.0 %    Lymphocytes % 25.3 %    Monocytes % 9.7 %    Eosinophils % 2.3 %    Basophils % 0.7 %    Neutrophils Absolute 4.2 1.7 - 7.7 K/uL    Lymphocytes Absolute 1.7 1.0 - 5.1 K/uL    Monocytes Absolute 0.7 0.0 - 1.3 K/uL    Eosinophils Absolute 0.2 0.0 - 0.6 K/uL    Basophils Absolute 0.0 0.0 - 0.2 K/uL   Basic Metabolic Panel w/ Reflex to MG   Result Value Ref Range    Sodium 135 (L) 136 - 145 mmol/L    Potassium reflex Magnesium 4.7 3.5 - 5.1 mmol/L    Chloride 105 99 - 110 mmol/L    CO2 22 21 - 32 mmol/L    Anion Gap 8 3 - 16    Glucose 112 (H) 70 - 99 mg/dL    BUN 34 (H) 7 - 20 mg/dL    CREATININE 3.3 (H) 0.8 - 1.3 mg/dL    GFR Non- 19 (A) >60    GFR  23 (A) >60    Calcium 9.2 8.3 - 10.6 mg/dL   Troponin   Result Value Ref Range    Troponin 0.05 (H) <0.01 ng/mL   Brain Natriuretic Peptide   Result Value Ref Range    Pro-BNP 7,803 (H) 0 - 124 pg/mL   Protime-INR   Result Value Ref Range    Protime 30.3 (H) 10.0 - 13.2 sec    INR 2.59 (H) 0.86 - 1.14   Troponin   Result Value Ref Range    Troponin 0.04 (H) <0.01 ng/mL   EKG 12 Lead   Result Value Ref Range    Ventricular Rate 80 BPM    Atrial Rate 84 BPM    QRS Duration 162 ms    Q-T Interval 432 ms    QTc Calculation (Bazett) 498 ms    R Axis -70 degrees    T Axis -78 degrees    Diagnosis       EKG performed in ER and to be interpreted by ER physician. Confirmed by MD, ER ([id number]),  Gregory Rock ([id number]) on 3/12/2021 7:19:50 AM       RECENT VITALS:  BP: (!) 151/113, Temp: 97.8 °F (36.6 °C), Pulse: 81, Resp: 21, SpO2: 100 %     Procedures         ED Course     The patient was given the following medications:  Orders Placed This Encounter   Medications    acetaminophen (TYLENOL) tablet 650 mg    methocarbamol (ROBAXIN) tablet 750 mg       CONSULTS:  None    MEDICAL DECISION MAKING / ASSESSMENT / Loveadele Ahmadi is a 73 y.o. male who initially presented to the emergency department for chest pain. He is seen by the previous team, please see their documentation for initial evaluation and work-up. At time of signout pending items were follow-up laboratory testing, reassessment of the patient and disposition. Laboratory testing shows an elevated. BNP as well as chest x-ray with some pulmonary edema consistent with mild heart failure exacerbation. The patient recently started Lasix and encouraged him to increase his home dosage. I did a ambulatory pulse oximetry test with the patient and he had no hypoxia or increased work of breathing during this. I believe the patient may have component of musculoskeletal strain in the setting of lifting heavy groceries as well because he does have reproducible tenderness to palpation of his anterior chest wall. I encouraged him to use conservative management for this chest wall pain. This included Tylenol and ibuprofen.   I instructed him to take a daily record of his weight and to follow-up with his primary care physician on Monday to further discuss his cardiac care. At this point the patient was discharged after receiving my usual thorough return precautions. Clinical Impression     1. Chest wall pain    2. Congestive heart failure, unspecified HF chronicity, unspecified heart failure type ([facility] [address] [facility]        Disposition     PATIENT REFERRED TO:  No follow-up provider specified.     DISCHARGE MEDICATIONS:  New Prescriptions    No medications on file       DISPOSITION    -DC with PCP follow up        Dwayne Ruiz MD  03/15/21 [id number]

## 2021-03-12 NOTE — ED TRIAGE NOTES
Pt states that 2-3 days ago he was lifting \"more groceries than I should have been,\" Since then c/o pain to left side chest. Pt called PCP who was concerned for a strained muscle, and told him to rest and ice. Pt states that the pain is worsening and is keeping him awake at night. Pain is near pacemaker site. EKG completed. Pt dressed in gown for exam, on telemetry monitor.

## 2021-03-15 ENCOUNTER — TELEPHONE (OUTPATIENT)
Dept: CARDIOLOGY CLINIC | Age: 71
End: 2021-03-15

## 2021-03-15 NOTE — TELEPHONE ENCOUNTER
Nancy espinoza. Called to let us know that pt canceled appointment to have INR checked and rescheduled for tomorrow.

## 2021-03-16 ENCOUNTER — ANTI-COAG VISIT (OUTPATIENT)
Dept: CARDIOLOGY CLINIC | Age: 71
End: 2021-03-16
Payer: MEDICARE

## 2021-03-16 DIAGNOSIS — I48.11 LONGSTANDING PERSISTENT ATRIAL FIBRILLATION (HCC): ICD-10-CM

## 2021-03-16 LAB — INR BLD: 1.9

## 2021-03-16 PROCEDURE — 93793 ANTICOAG MGMT PT WARFARIN: CPT | Performed by: NURSE PRACTITIONER

## 2021-03-22 RX ORDER — SILDENAFIL 100 MG/1
TABLET, FILM COATED ORAL
Qty: 10 TABLET | Refills: 0 | Status: SHIPPED | OUTPATIENT
Start: 2021-03-22 | End: 2021-05-13

## 2021-03-30 ENCOUNTER — TELEPHONE (OUTPATIENT)
Dept: CARDIOLOGY CLINIC | Age: 71
End: 2021-03-30

## 2021-03-30 NOTE — TELEPHONE ENCOUNTER
[facility] stating that Alix Keith Is getting discharged from skilled nursing and going to the lab to get his PT-INR.

## 2021-03-30 NOTE — TELEPHONE ENCOUNTER
Nurse Sarahi Sharma called from [facility] to inform Dr Audra Valera office  That patient has missed multiple appointments to get his  INR checked with the last Blood  draw being 3/16/21 Nurse will reschedule patient for later next week  For any questions please contact Sarahi Sharma [phone] I gave nurse a verbal order to obtain INR next week patient notifed  please

## 2021-04-26 ENCOUNTER — NURSE ONLY (OUTPATIENT)
Dept: CARDIOLOGY CLINIC | Age: 71
End: 2021-04-26
Payer: MEDICARE

## 2021-04-26 DIAGNOSIS — I48.11 LONGSTANDING PERSISTENT ATRIAL FIBRILLATION (HCC): ICD-10-CM

## 2021-04-26 DIAGNOSIS — Z95.810 ICD (IMPLANTABLE CARDIOVERTER-DEFIBRILLATOR) IN PLACE: ICD-10-CM

## 2021-04-26 DIAGNOSIS — I42.9 CARDIOMYOPATHY (HCC): ICD-10-CM

## 2021-04-26 DIAGNOSIS — I42.9 CARDIOMYOPATHY, UNSPECIFIED TYPE (HCC): ICD-10-CM

## 2021-04-26 NOTE — PROGRESS NOTES
Merlin Remote transmission of pacemaker and/or ICD, or implanted heart monitor shows normal cardiac device function. Hx pAF. AT/AF BURDEN 23%. ([address], [facility]). V rates controlled. AP 70%.  99%. [place] is within normal limits. Follow up in 3 months via [address].

## 2021-04-26 NOTE — LETTER
[address] [phone]  [address] [facility],[address] [phone]    Pacemaker/Defibrillator Clinic          04/26/21        [address] [facility] [address]  [address] [place] [address] [place]        Dear Charisma Tan    This letter is to inform you that we received the transmission from your monitor at home that checks your implanted heart device. The next date your monitor will automatically transmit will be 7/26. If your report needs attention we will notify you. Your device and monitor are wireless and most transmit cellularly, but please periodically check your monitor is still plugged in to the electrical outlet. If you still use the telephone land line to send please ensure the connection to the phone lamar is secure. This will help to ensure successful automatic transmissions in the future. Also, the monitor needs to be close to you while sleeping at night. Please be aware that the remote device transmission sites are periodically monitored only during regular business hours during which simultaneous in-office device clinics are being run. If your transmission requires attention, we will contact you as soon as possible. Thank you.             [facility]

## 2021-05-02 PROCEDURE — 93296 REM INTERROG EVL PM/IDS: CPT | Performed by: INTERNAL MEDICINE

## 2021-05-02 PROCEDURE — 93295 DEV INTERROG REMOTE 1/2/MLT: CPT | Performed by: INTERNAL MEDICINE

## 2021-05-06 DIAGNOSIS — I48.21 PERMANENT ATRIAL FIBRILLATION (HCC): ICD-10-CM

## 2021-05-06 DIAGNOSIS — Z79.899 ENCOUNTER FOR LONG-TERM (CURRENT) USE OF OTHER MEDICATIONS: ICD-10-CM

## 2021-05-06 LAB
INR BLD: 1.5 (ref 0.86–1.14)
PROTHROMBIN TIME: 17.5 SEC (ref 10–13.2)

## 2021-05-07 ENCOUNTER — TELEPHONE (OUTPATIENT)
Dept: CARDIOLOGY CLINIC | Age: 71
End: 2021-05-07

## 2021-05-07 NOTE — TELEPHONE ENCOUNTER
Patient called to find out INR results and instructions. Please call him about this at [phone]. Thanks.

## 2021-05-10 ENCOUNTER — ANTI-COAG VISIT (OUTPATIENT)
Dept: CARDIOLOGY CLINIC | Age: 71
End: 2021-05-10
Payer: MEDICARE

## 2021-05-10 DIAGNOSIS — I48.11 LONGSTANDING PERSISTENT ATRIAL FIBRILLATION (HCC): ICD-10-CM

## 2021-05-10 LAB — INR BLD: 1.5

## 2021-05-10 PROCEDURE — 93793 ANTICOAG MGMT PT WARFARIN: CPT | Performed by: NURSE PRACTITIONER

## 2021-05-10 NOTE — PROGRESS NOTES
ANTICOAGULATION MONITORING    Loulou Ruano, 1950      Anticoagulation Indication PAF non valvular  Persistent range 2-3   Referring Physician:   Litzy Aguayo  Goal INR Range:  2/3Duration of Anticoagulation Therapy:      Lab Draw:  Product patient has at home: warfarin 5  mg    Recent INR Results:  Lab Results   Component Value Date    INR 1.50 (H) 05/06/2021    INR 1.90 03/16/2021    INR 2.59 (H) 03/12/2021    INR 3.00 03/10/2021       INR Summary                          Warfarin regimen (mg)  Date INR A/P Sun Mon Tue Wed Thu Fri Sat Mg/wk   5/6/21  1.5  Below goal 5/ 7.5 5 5 5 5 5 37.5   3/16/21 1.9 Below goal 7.5 7.5 5 7.5 7.5 5 7.5 47.5   3/12/21 2.59 At goal 7.5 7.5 5 7.5 7.5 5 7.5 47.5   3/4/21 4.6  Above goal 7.5 7.5 5 7.5 7.5 5 7.5 47.5   2/25/21 3.3 Above goal 7.5 7.5 7.5 7.5 7.5 7.5 5 50mg   2/22/21  4.8  Above goal 7.5 Hold  5 7.5 7.5 7.5` 7.5      2/16/21 3.6 Above goal, decrease by 2.5 mg 7.5 7.5 5 7.5 7.5 7.5 7.5 50   1/14/21 2.2 At goal 7.5 7.5 7.5 7.5 7.5 7.5 7.5 52.5   12/22/20 3.4 Above goal 7.5 10 7.5 7.5 7.5 7.5 7.5 55mg   12/7/20 3.2 Above goal 7.5 7.5 7.5 7.5 7.5 7.5 7.5 52.5   9/2/20 2.05 At goal, no change 7.5 7.5 7.5 7.5 7.5 7.5 7.5 52.5   7/11/19 1.1 Below goal 7.5mg 7.5mg 7.5mg 7.5mg 7.5mg 7.5mg 7.5mg 52.5   6/10/19 2.6 At goal 5mg 7.5mg 7.5mg 7.5mg 7.5mg 7.5mg 7.5mg 50 mg   5/23/19 1.3 Below goal 7.5mg 7.5mg 7.5mg 7.5mg 7.5mg 7.5mg 7.5mg 52.5   5/14/19 1.1 Below goal 5mg 7.5mg 7.5mg 7.5mg 7.5mg 7.5mg 7.5mg 50mg   4/29/19 1.9 Below goal 7.5mg 7.5mg 7.5mg 7.5mg 7.5mg 5mg 7.5mg 50mg   4/15/19 3.9 Above goal 7.5mg 7.5mg 5mg 7.5mg 7.5mg 5mg 7.5mg 47.5   4/3/19 4.3 Above goals 7.5mg 7.5mg 7.5mg 7.5mg 7.5mg hold hold 37.5   3/25/19 6.6 Above goals           3/7/19 1.0 Below goal 10mg 10mg 10mg 10mg 10mg 10mg 10mg 70mg         Recheck INR: 5/10/21  Patient states that when he was discharged from the hospital in April his dose of warfarin was changed to 5mg qd.  This was never communicated to our office. May/ 6 /21 inr was 1.5.  Patient instructed to take 7.5 mg tonight and recheck on 5/13/21       Melquiades Arreguin APRN, CVNP FNP

## 2021-05-13 RX ORDER — SILDENAFIL 100 MG/1
TABLET, FILM COATED ORAL
Qty: 10 TABLET | Refills: 0 | Status: SHIPPED | OUTPATIENT
Start: 2021-05-13 | End: 2021-06-07

## 2021-05-17 DIAGNOSIS — Z79.899 ENCOUNTER FOR LONG-TERM (CURRENT) USE OF OTHER MEDICATIONS: ICD-10-CM

## 2021-05-17 DIAGNOSIS — I48.21 PERMANENT ATRIAL FIBRILLATION (HCC): ICD-10-CM

## 2021-05-17 LAB
INR BLD: 3.46 (ref 0.86–1.14)
PROTHROMBIN TIME: 40.7 SEC (ref 10–13.2)

## 2021-05-18 ENCOUNTER — ANTI-COAG VISIT (OUTPATIENT)
Dept: CARDIOLOGY CLINIC | Age: 71
End: 2021-05-18
Payer: MEDICARE

## 2021-05-18 DIAGNOSIS — I48.11 LONGSTANDING PERSISTENT ATRIAL FIBRILLATION (HCC): ICD-10-CM

## 2021-05-18 LAB — INTERNATIONAL NORMALIZATION RATIO, POC: 3.46

## 2021-05-18 PROCEDURE — 85610 PROTHROMBIN TIME: CPT | Performed by: INTERNAL MEDICINE

## 2021-05-18 PROCEDURE — 93793 ANTICOAG MGMT PT WARFARIN: CPT | Performed by: NURSE PRACTITIONER

## 2021-05-18 NOTE — PROGRESS NOTES
ANTICOAGULATION MONITORING    Jose Gottlieb, 1950      Anticoagulation Indication PAF non valvular  Persistent range 2-3   Referring Physician:   Ed Whitaker  Goal INR Range:  2/3Duration of Anticoagulation Therapy:      Lab Draw:  Product patient has at home: warfarin 5  mg    Recent INR Results:  Lab Results   Component Value Date    INR 3.46 (H) 05/17/2021    INR 3.46 05/17/2021    INR 1.50 (H) 05/06/2021    INR 1.50 05/06/2021       INR Summary                          Warfarin regimen (mg)  Date INR A/P Sun Mon Tue Wed Thu Fri Sat Mg/wk   5/17/21 3.46 Above goal 7.5 7.5 5 7.5 7.5 7.5 7.5 37.5   5/6/21  1.5  Below goal 5/ 7.5 5 5 5 5 5 37.5   3/16/21 1.9 Below goal 7.5 7.5 5 7.5 7.5 5 7.5 47.5   3/12/21 2.59 At goal 7.5 7.5 5 7.5 7.5 5 7.5 47.5   3/4/21 4.6  Above goal 7.5 7.5 5 7.5 7.5 5 7.5 47.5   2/25/21 3.3 Above goal 7.5 7.5 7.5 7.5 7.5 7.5 5 50mg   2/22/21  4.8  Above goal 7.5 Hold  5 7.5 7.5 7.5` 7.5      2/16/21 3.6 Above goal, decrease by 2.5 mg 7.5 7.5 5 7.5 7.5 7.5 7.5 50   1/14/21 2.2 At goal 7.5 7.5 7.5 7.5 7.5 7.5 7.5 52.5   12/22/20 3.4 Above goal 7.5 10 7.5 7.5 7.5 7.5 7.5 55mg   12/7/20 3.2 Above goal 7.5 7.5 7.5 7.5 7.5 7.5 7.5 52.5   9/2/20 2.05 At goal, no change 7.5 7.5 7.5 7.5 7.5 7.5 7.5 52.5   7/11/19 1.1 Below goal 7.5mg 7.5mg 7.5mg 7.5mg 7.5mg 7.5mg 7.5mg 52.5   6/10/19 2.6 At goal 5mg 7.5mg 7.5mg 7.5mg 7.5mg 7.5mg 7.5mg 50 mg   5/23/19 1.3 Below goal 7.5mg 7.5mg 7.5mg 7.5mg 7.5mg 7.5mg 7.5mg 52.5   5/14/19 1.1 Below goal 5mg 7.5mg 7.5mg 7.5mg 7.5mg 7.5mg 7.5mg 50mg   4/29/19 1.9 Below goal 7.5mg 7.5mg 7.5mg 7.5mg 7.5mg 5mg 7.5mg 50mg   4/15/19 3.9 Above goal 7.5mg 7.5mg 5mg 7.5mg 7.5mg 5mg 7.5mg 47.5   4/3/19 4.3 Above goals 7.5mg 7.5mg 7.5mg 7.5mg 7.5mg hold hold 37.5   3/25/19 6.6 Above goals           3/7/19 1.0 Below goal 10mg 10mg 10mg 10mg 10mg 10mg 10mg 70mg         Recheck INR: 5/21/21  Patient states that he has been taking 7.5 mg qd.   Instructed to take 5mg tonight then resume 7.5 mg qd repeat protime on Friday 5/21/21.        Nonnie Nickels APRN, CVNP FNP

## 2021-05-24 DIAGNOSIS — Z79.899 ENCOUNTER FOR LONG-TERM (CURRENT) USE OF OTHER MEDICATIONS: ICD-10-CM

## 2021-05-24 DIAGNOSIS — I48.21 PERMANENT ATRIAL FIBRILLATION (HCC): ICD-10-CM

## 2021-05-24 LAB
INR BLD: 4.12 (ref 0.86–1.14)
PROTHROMBIN TIME: 48.5 SEC (ref 10–13.2)

## 2021-05-26 ENCOUNTER — ANTI-COAG VISIT (OUTPATIENT)
Dept: CARDIOLOGY CLINIC | Age: 71
End: 2021-05-26

## 2021-05-26 NOTE — PROGRESS NOTES
ANTICOAGULATION MONITORING    Zurdo Baker, 1950      Anticoagulation Indication PAF non valvular  Persistent range 2-3   Referring Physician:   Shauna Davis  Goal INR Range:  2/3Duration of Anticoagulation Therapy:      Lab Draw:  Product patient has at home: warfarin 5  mg    Recent INR Results:  Lab Results   Component Value Date    INR 4.12 (H) 05/24/2021    INR 3.46 (H) 05/17/2021    INR 3.46 05/17/2021    INR 1.50 (H) 05/06/2021       INR Summary                          Warfarin regimen (mg)  Date INR A/P Sun Mon Tue Wed Thu Fri Sat Mg/wk   5/24/21 4.1 Above goal 7.5 7.5 7.5  7.5 7.5 7.5 45.0   5/17/21 3.46 Above goal 7.5 7.5 5 7.5 7.5 7.5 7.5 37.5   5/6/21  1.5  Below goal 5/ 7.5 5 5 5 5 5 37.5   3/16/21 1.9 Below goal 7.5 7.5 5 7.5 7.5 5 7.5 47.5   3/12/21 2.59 At goal 7.5 7.5 5 7.5 7.5 5 7.5 47.5   3/4/21 4.6  Above goal 7.5 7.5 5 7.5 7.5 5 7.5 47.5   2/25/21 3.3 Above goal 7.5 7.5 7.5 7.5 7.5 7.5 5 50mg   2/22/21  4.8  Above goal 7.5 Hold  5 7.5 7.5 7.5` 7.5      2/16/21 3.6 Above goal, decrease by 2.5 mg 7.5 7.5 5 7.5 7.5 7.5 7.5 50   1/14/21 2.2 At goal 7.5 7.5 7.5 7.5 7.5 7.5 7.5 52.5   12/22/20 3.4 Above goal 7.5 10 7.5 7.5 7.5 7.5 7.5 55mg   12/7/20 3.2 Above goal 7.5 7.5 7.5 7.5 7.5 7.5 7.5 52.5   9/2/20 2.05 At goal, no change 7.5 7.5 7.5 7.5 7.5 7.5 7.5 52.5   7/11/19 1.1 Below goal 7.5mg 7.5mg 7.5mg 7.5mg 7.5mg 7.5mg 7.5mg 52.5   6/10/19 2.6 At goal 5mg 7.5mg 7.5mg 7.5mg 7.5mg 7.5mg 7.5mg 50 mg   5/23/19 1.3 Below goal 7.5mg 7.5mg 7.5mg 7.5mg 7.5mg 7.5mg 7.5mg 52.5   5/14/19 1.1 Below goal 5mg 7.5mg 7.5mg 7.5mg 7.5mg 7.5mg 7.5mg 50mg   4/29/19 1.9 Below goal 7.5mg 7.5mg 7.5mg 7.5mg 7.5mg 5mg 7.5mg 50mg   4/15/19 3.9 Above goal 7.5mg 7.5mg 5mg 7.5mg 7.5mg 5mg 7.5mg 47.5   4/3/19 4.3 Above goals 7.5mg 7.5mg 7.5mg 7.5mg 7.5mg hold hold 37.5   3/25/19 6.6 Above goals           3/7/19 1.0 Below goal 10mg 10mg 10mg 10mg 10mg 10mg 10mg 70mg         Recheck INR: 4.12  Patient states that he has

## 2021-05-28 ENCOUNTER — TELEPHONE (OUTPATIENT)
Dept: CARDIOLOGY CLINIC | Age: 71
End: 2021-05-28

## 2021-05-28 NOTE — TELEPHONE ENCOUNTER
Pt has an abnormal PT inr. Protime value  is 48.5. INR Value is 4.12. Jerrcia Mini is out. Pt is usually dosed by her Please advise.

## 2021-06-01 DIAGNOSIS — Z79.899 ENCOUNTER FOR LONG-TERM (CURRENT) USE OF OTHER MEDICATIONS: ICD-10-CM

## 2021-06-01 DIAGNOSIS — I48.21 PERMANENT ATRIAL FIBRILLATION (HCC): ICD-10-CM

## 2021-06-01 LAB
INR BLD: 1.71 (ref 0.86–1.14)
PROTHROMBIN TIME: 20 SEC (ref 10–13.2)

## 2021-06-02 ENCOUNTER — ANTI-COAG VISIT (OUTPATIENT)
Dept: CARDIOLOGY CLINIC | Age: 71
End: 2021-06-02

## 2021-06-02 ENCOUNTER — TELEPHONE (OUTPATIENT)
Dept: CARDIOLOGY CLINIC | Age: 71
End: 2021-06-02

## 2021-06-02 NOTE — TELEPHONE ENCOUNTER
Patient calling in regards to PT/INR (A-fib)    Had this done yesterday    Protime 20.0    INR 1.71    Please call to advise [phone]

## 2021-06-03 ENCOUNTER — ANTI-COAG VISIT (OUTPATIENT)
Dept: CARDIOLOGY CLINIC | Age: 71
End: 2021-06-03
Payer: MEDICARE

## 2021-06-03 DIAGNOSIS — I48.11 LONGSTANDING PERSISTENT ATRIAL FIBRILLATION (HCC): ICD-10-CM

## 2021-06-03 LAB — INTERNATIONAL NORMALIZATION RATIO, POC: 1.7

## 2021-06-03 PROCEDURE — 93793 ANTICOAG MGMT PT WARFARIN: CPT | Performed by: NURSE PRACTITIONER

## 2021-06-03 PROCEDURE — 85610 PROTHROMBIN TIME: CPT | Performed by: INTERNAL MEDICINE

## 2021-06-03 NOTE — PROGRESS NOTES
ANTICOAGULATION MONITORING    Irais Servin, 1950      Anticoagulation Indication PAF non valvular  Persistent range 2-3   Referring Physician:   Christine Small  Goal INR Range:  2/3Duration of Anticoagulation Therapy:      Lab Draw:  Product patient has at home: warfarin 5  mg    Recent INR Results:  Lab Results   Component Value Date    INR 1.7 06/01/2021    INR 1.71 (H) 06/01/2021    INR 4.12 (H) 05/24/2021    INR 3.46 (H) 05/17/2021       INR Summary                          Warfarin regimen (mg)  Date INR A/P Sun Mon Tue Wed Thu Fri Sat Mg/wk   6/1/21 1.7 Below goal 7.5 7.5 7.5 7.5 5 7.5 7.5 50   5/24/21 4.1 Above goal 7.5 7.5 7.5  7.5 7.5 7.5 45.0   5/17/21 3.46 Above goal 7.5 7.5 5 7.5 7.5 7.5 7.5 37.5   5/6/21  1.5  Below goal 5/ 7.5 5 5 5 5 5 37.5   3/16/21 1.9 Below goal 7.5 7.5 5 7.5 7.5 5 7.5 47.5   3/12/21 2.59 At goal 7.5 7.5 5 7.5 7.5 5 7.5 47.5   3/4/21 4.6  Above goal 7.5 7.5 5 7.5 7.5 5 7.5 47.5   2/25/21 3.3 Above goal 7.5 7.5 7.5 7.5 7.5 7.5 5 50mg   2/22/21  4.8  Above goal 7.5 Hold  5 7.5 7.5 7.5` 7.5      2/16/21 3.6 Above goal, decrease by 2.5 mg 7.5 7.5 5 7.5 7.5 7.5 7.5 50   1/14/21 2.2 At goal 7.5 7.5 7.5 7.5 7.5 7.5 7.5 52.5   12/22/20 3.4 Above goal 7.5 10 7.5 7.5 7.5 7.5 7.5 55mg   12/7/20 3.2 Above goal 7.5 7.5 7.5 7.5 7.5 7.5 7.5 52.5   9/2/20 2.05 At goal, no change 7.5 7.5 7.5 7.5 7.5 7.5 7.5 52.5   7/11/19 1.1 Below goal 7.5mg 7.5mg 7.5mg 7.5mg 7.5mg 7.5mg 7.5mg 52.5   6/10/19 2.6 At goal 5mg 7.5mg 7.5mg 7.5mg 7.5mg 7.5mg 7.5mg 50 mg   5/23/19 1.3 Below goal 7.5mg 7.5mg 7.5mg 7.5mg 7.5mg 7.5mg 7.5mg 52.5   5/14/19 1.1 Below goal 5mg 7.5mg 7.5mg 7.5mg 7.5mg 7.5mg 7.5mg 50mg   4/29/19 1.9 Below goal 7.5mg 7.5mg 7.5mg 7.5mg 7.5mg 5mg 7.5mg 50mg   4/15/19 3.9 Above goal 7.5mg 7.5mg 5mg 7.5mg 7.5mg 5mg 7.5mg 47.5   4/3/19 4.3 Above goals 7.5mg 7.5mg 7.5mg 7.5mg 7.5mg hold hold 37.5   3/25/19 6.6 Above goals           3/7/19 1.0 Below goal 10mg 10mg 10mg 10mg 10mg 10mg 10mg 70mg

## 2021-06-07 RX ORDER — SILDENAFIL 100 MG/1
TABLET, FILM COATED ORAL
Qty: 10 TABLET | Refills: 1 | Status: SHIPPED | OUTPATIENT
Start: 2021-06-07 | End: 2021-07-22

## 2021-06-09 DIAGNOSIS — Z79.899 ENCOUNTER FOR LONG-TERM (CURRENT) USE OF OTHER MEDICATIONS: ICD-10-CM

## 2021-06-09 DIAGNOSIS — I48.21 PERMANENT ATRIAL FIBRILLATION (HCC): ICD-10-CM

## 2021-06-09 LAB
INR BLD: 2.58 (ref 0.86–1.14)
PROTHROMBIN TIME: 30.2 SEC (ref 10–13.2)

## 2021-06-10 LAB
INTERNATIONAL NORMALIZATION RATIO, POC: 2.5
INTERNATIONAL NORMALIZATION RATIO, POC: 2.58

## 2021-06-10 NOTE — PROGRESS NOTES
ANTICOAGULATION MONITORING    Rudy Berry, 1950      Anticoagulation Indication PAF non valvular  Persistent range 2-3   Referring Physician:   Brigida George  Goal INR Range:  2/3Duration of Anticoagulation Therapy:      Lab Draw:  Product patient has at home: warfarin 5  mg    Recent INR Results:  Lab Results   Component Value Date    INR 2.58 06/09/2021    INR 2.5 06/09/2021    INR 2.58 (H) 06/09/2021    INR 1.7 06/01/2021       INR Summary                          Warfarin regimen (mg)  Date INR A/P Sun Mon Tue Wed Thu Fri Sat Mg/wk   6/9/21 2.5 AT GOAL 7.5 7.5 7.5 7.5 7.5 7.5 7.5 52.0   5/24/21 4.1 Above goal 7.5 7.5 7.5  7.5 7.5 7.5 45.0   5/17/21 3.46 Above goal 7.5 7.5 5 7.5 7.5 7.5 7.5 37.5   5/6/21  1.5  Below goal 5/ 7.5 5 5 5 5 5 37.5   3/16/21 1.9 Below goal 7.5 7.5 5 7.5 7.5 5 7.5 47.5   3/12/21 2.59 At goal 7.5 7.5 5 7.5 7.5 5 7.5 47.5   3/4/21 4.6  Above goal 7.5 7.5 5 7.5 7.5 5 7.5 47.5   2/25/21 3.3 Above goal 7.5 7.5 7.5 7.5 7.5 7.5 5 50mg   2/22/21  4.8  Above goal 7.5 Hold  5 7.5 7.5 7.5` 7.5      2/16/21 3.6 Above goal, decrease by 2.5 mg 7.5 7.5 5 7.5 7.5 7.5 7.5 50   1/14/21 2.2 At goal 7.5 7.5 7.5 7.5 7.5 7.5 7.5 52.5   12/22/20 3.4 Above goal 7.5 10 7.5 7.5 7.5 7.5 7.5 55mg   12/7/20 3.2 Above goal 7.5 7.5 7.5 7.5 7.5 7.5 7.5 52.5   9/2/20 2.05 At goal, no change 7.5 7.5 7.5 7.5 7.5 7.5 7.5 52.5   7/11/19 1.1 Below goal 7.5mg 7.5mg 7.5mg 7.5mg 7.5mg 7.5mg 7.5mg 52.5   6/10/19 2.6 At goal 5mg 7.5mg 7.5mg 7.5mg 7.5mg 7.5mg 7.5mg 50 mg   5/23/19 1.3 Below goal 7.5mg 7.5mg 7.5mg 7.5mg 7.5mg 7.5mg 7.5mg 52.5   5/14/19 1.1 Below goal 5mg 7.5mg 7.5mg 7.5mg 7.5mg 7.5mg 7.5mg 50mg   4/29/19 1.9 Below goal 7.5mg 7.5mg 7.5mg 7.5mg 7.5mg 5mg 7.5mg 50mg   4/15/19 3.9 Above goal 7.5mg 7.5mg 5mg 7.5mg 7.5mg 5mg 7.5mg 47.5   4/3/19 4.3 Above goals 7.5mg 7.5mg 7.5mg 7.5mg 7.5mg hold hold 37.5   3/25/19 6.6 Above goals           3/7/19 1.0 Below goal 10mg 10mg 10mg 10mg 10mg 10mg 10mg 70mg Recheck INR: 2.5  Patient states that he has been taking 7.5 mg qd. Instructed to CONTINUE AON SAME DOSE AND REPEAT INR ON  6/23/21.        Yaz GARCIA, CVNP FNP

## 2021-06-28 DIAGNOSIS — I42.9 CARDIOMYOPATHY, UNSPECIFIED TYPE (HCC): ICD-10-CM

## 2021-06-28 DIAGNOSIS — Z79.01 LONG TERM CURRENT USE OF ANTICOAGULANT: Primary | ICD-10-CM

## 2021-06-28 DIAGNOSIS — Z79.01 LONG TERM CURRENT USE OF ANTICOAGULANT: ICD-10-CM

## 2021-06-28 LAB
INR BLD: 2.37 (ref 0.86–1.14)
PROTHROMBIN TIME: 27.7 SEC (ref 10–13.2)

## 2021-06-29 ENCOUNTER — ANTI-COAG VISIT (OUTPATIENT)
Dept: CARDIOLOGY CLINIC | Age: 71
End: 2021-06-29
Payer: MEDICARE

## 2021-06-29 DIAGNOSIS — I48.19 PERSISTENT ATRIAL FIBRILLATION (HCC): ICD-10-CM

## 2021-06-29 PROCEDURE — 93793 ANTICOAG MGMT PT WARFARIN: CPT | Performed by: NURSE PRACTITIONER

## 2021-06-29 NOTE — PROGRESS NOTES
ANTICOAGULATION MONITORING    Isa Byrne, 1950      Anticoagulation Indication PAF non valvular  Persistent range 2-3   Referring Physician:   Albertina Skiff  Goal INR Range:  2/3Duration of Anticoagulation Therapy:      Lab Draw:  Product patient has at home: warfarin 5  mg    Recent INR Results:  Lab Results   Component Value Date    INR 2.37 (H) 06/28/2021    INR 2.58 06/09/2021    INR 2.5 06/09/2021    INR 2.58 (H) 06/09/2021       INR Summary                          Warfarin regimen (mg)  Date INR A/P Sun Mon Tue Wed Thu Fri Sat Mg/wk   6/29/21 2.3 At goal 7.5 7.5 7.5 7.5 7.5 7.5 7.5 52.0   6/9/21 2.5 AT GOAL 7.5 7.5 7.5 7.5 7.5 7.5 7.5 52.0   5/24/21 4.1 Above goal 7.5 7.5 7.5  7.5 7.5 7.5 45.0   5/17/21 3.46 Above goal 7.5 7.5 5 7.5 7.5 7.5 7.5 37.5   5/6/21  1.5  Below goal 5/ 7.5 5 5 5 5 5 37.5   3/16/21 1.9 Below goal 7.5 7.5 5 7.5 7.5 5 7.5 47.5   3/12/21 2.59 At goal 7.5 7.5 5 7.5 7.5 5 7.5 47.5   3/4/21 4.6  Above goal 7.5 7.5 5 7.5 7.5 5 7.5 47.5   2/25/21 3.3 Above goal 7.5 7.5 7.5 7.5 7.5 7.5 5 50mg   2/22/21  4.8  Above goal 7.5 Hold  5 7.5 7.5 7.5` 7.5      2/16/21 3.6 Above goal, decrease by 2.5 mg 7.5 7.5 5 7.5 7.5 7.5 7.5 50   1/14/21 2.2 At goal 7.5 7.5 7.5 7.5 7.5 7.5 7.5 52.5   12/22/20 3.4 Above goal 7.5 10 7.5 7.5 7.5 7.5 7.5 55mg   12/7/20 3.2 Above goal 7.5 7.5 7.5 7.5 7.5 7.5 7.5 52.5   9/2/20 2.05 At goal, no change 7.5 7.5 7.5 7.5 7.5 7.5 7.5 52.5   7/11/19 1.1 Below goal 7.5mg 7.5mg 7.5mg 7.5mg 7.5mg 7.5mg 7.5mg 52.5   6/10/19 2.6 At goal 5mg 7.5mg 7.5mg 7.5mg 7.5mg 7.5mg 7.5mg 50 mg   5/23/19 1.3 Below goal 7.5mg 7.5mg 7.5mg 7.5mg 7.5mg 7.5mg 7.5mg 52.5   5/14/19 1.1 Below goal 5mg 7.5mg 7.5mg 7.5mg 7.5mg 7.5mg 7.5mg 50mg   4/29/19 1.9 Below goal 7.5mg 7.5mg 7.5mg 7.5mg 7.5mg 5mg 7.5mg 50mg   4/15/19 3.9 Above goal 7.5mg 7.5mg 5mg 7.5mg 7.5mg 5mg 7.5mg 47.5   4/3/19 4.3 Above goals 7.5mg 7.5mg 7.5mg 7.5mg 7.5mg hold hold 37.5   3/25/19 6.6 Above goals           3/7/19 1.0 Below goal 10mg 10mg 10mg 10mg 10mg 10mg 10mg 70mg         Recheck INR: 2.3  Patient states that he has been taking 7.5 mg qd. Instructed to CONTINUE AON SAME DOSE AND REPEAT INR ON  7/12/21.        Marcia Bautista APRN, CVNP FNP

## 2021-06-30 ENCOUNTER — OFFICE VISIT (OUTPATIENT)
Dept: CARDIOLOGY CLINIC | Age: 71
End: 2021-06-30
Payer: MEDICARE

## 2021-06-30 VITALS
BODY MASS INDEX: 37.36 KG/M2 | WEIGHT: 291 LBS | HEART RATE: 68 BPM | SYSTOLIC BLOOD PRESSURE: 124 MMHG | DIASTOLIC BLOOD PRESSURE: 94 MMHG

## 2021-06-30 DIAGNOSIS — I48.0 PAROXYSMAL ATRIAL FIBRILLATION (HCC): ICD-10-CM

## 2021-06-30 DIAGNOSIS — Z95.0 PACEMAKER: ICD-10-CM

## 2021-06-30 DIAGNOSIS — I10 ESSENTIAL HYPERTENSION: ICD-10-CM

## 2021-06-30 PROCEDURE — 99214 OFFICE O/P EST MOD 30 MIN: CPT | Performed by: INTERNAL MEDICINE

## 2021-06-30 RX ORDER — WARFARIN SODIUM 5 MG/1
TABLET ORAL
Qty: 180 TABLET | Refills: 3 | Status: SHIPPED | OUTPATIENT
Start: 2021-06-30 | End: 2022-01-18

## 2021-06-30 NOTE — PROGRESS NOTES
[facility]  Office Visit           Sherren Mutters MD,  [facility]                     Cardiology           Rick Del Rosario  1950 June 30, 2021         HPI:  The patient is 73 y.o. male with CM  palpitations/Pt has [employer] Bi- V ICD w corvue implanted by Dr. Zion Connolly. Placed in 2015 by Dr. Larissa Guzman the patient is stable today without complaints of problems. Recently replaced by Dr. Zion Connolly on 7/7/2020. He is clinically doing well at this time. He did have the coronavirus infection back in September. Did subsequently have the coronavirus vaccine Kalyan lin. No current issues at this time. Did have a cardiac cath May 2019 and no need for acute intervention. Atrial fibrillation at his baseline but he does have a pacemaker device defibrillator that is in position and seems to be working continuously. CM av paced /   hx PAF AV paced   CRD follows Dr. Julius Terry   Obesity /dicussed activity nutrition / has lost over 200#   NICM / no c/o PND or SULMA   SJM CRT-D: interrogated on 3/2019  / 10% afib / placed in 2015 by Dr. Larissa Guzman   us renals 5/9/19   Medical renal disease. No hydronephrosis   Probable incidental splenic cavernous hemangioma     Echo 2016 EF was 50-55% Mild biatrial enlargement.   Sinus of valsalva appears to be mildly dilated.   Mild-moderate mitral regurgitation is present.   Mild pulmonic regurgitation present. [facility] 5/10/19 Left Main: Normal   Left Anterior Descending: Tortuosity with mild proximal calcification but no significant stenosis. There is a large septal  that has some ostial narrowing of 60-70%. Circumflex: Large vessel probably codominant. Mild plaquing but no significant stenoses. Right Coronary: The vessel is probably codominant. Mild plaque but no significant stenosis. Left Ventriculogram: Ejection fraction 30-35%.   Anterior wall apex is akinetic suggesting regional wall motion abnormality but we do not find a coronary lesion that would cause this problem. Assessment: Mild coronary artery disease but does not require intervention. Cardiomyopathy ischemic appearing with regional wall motion in the anterior wall and apex akinesia and reduced ejection fraction  Recommendations: Optimize medical care continue dual antiplatelet therapy. viewed most recent test with pt. with hx palpitations/ denies having any recent  palpitations      Review of Systems:  Constitutional: Denies  fatigue, weakness, night sweats or fever. HEENT: Denies new visual changes, ringing in ears, nosebleeds,nasal congestion  Respiratory: Denies new or change in SOB, PND, orthopnea or cough. Cardiovascular: see HPI  GI: Denies N/V, diarrhea, constipation, abdominal pain, change in bowel habits, melena or hematochezia  : Denies urinary frequency, urgency, incontinence, hematuria or dysuria. Skin: Denies rash, hives, or cyanosis  Musculoskeletal: Denies joint or muscle aches/pain  Neurological: Denies syncope or TIA-like symptoms. Psychiatric: Denies anxiety, insomnia or depression     Past Medical History:   Diagnosis Date    Atrial fibrillation ([facility] [address]     CHF (congestive heart failure) ([facility])     Dr. Rausch(cardiologist)    CKD (chronic kidney disease) stage 3, GFR 30-59 ml/min ([facility])     Fracture     R index finger and L ankle    Gout     Hnands/feet/elbows    Hypertension     Pacemaker      Past Surgical History:   Procedure Laterality Date    CARDIAC CATHETERIZATION       Family History   Problem Relation Age of Onset    Heart Disease Father         CHF- of    High Blood Pressure Father     Heart Disease Mother     High Blood Pressure Mother     High Blood Pressure Brother         Stent placement x2     Social History     Tobacco Use    Smoking status: Passive Smoke Exposure - Never Smoker    Smokeless tobacco: Never Used   Substance Use Topics    Alcohol use:  Yes     Alcohol/week: 0.0 standard drinks     Comment: occasionally    Drug use: Yes     Comment: Quit using in 2007       Allergies   Allergen Reactions    Peanut-Containing Drug Products Anaphylaxis    Penicillins      Current Outpatient Medications   Medication Sig Dispense Refill    warfarin (COUMADIN) 5 MG tablet Take 1 1/2 tab daily or as directed by physician 180 tablet 3    sildenafil (VIAGRA) 100 MG tablet TAKE ONE TABLET BY MOUTH DAILY AS NEEDED FOR ERECTILE DYSFUNCTION 10 tablet 1    amiodarone (CORDARONE) 200 MG tablet Take one tablet by mouth daily 90 tablet 3    hydrALAZINE (APRESOLINE) 10 MG tablet Take 1 tablet by mouth 3 times daily 90 tablet 5    chlorthalidone (HYGROTON) 25 MG tablet Take 25 mg by mouth daily      magnesium oxide (MAG-OX) 400 (241.3 Mg) MG TABS tablet TAKE ONE TABLET BY MOUTH TWICE A DAY 60 tablet 5    isosorbide mononitrate (IMDUR) 30 MG extended release tablet TAKE ONE TABLET BY MOUTH DAILY 90 tablet 3    carvedilol (COREG) 25 MG tablet TAKE ONE TABLET BY MOUTH TWICE A DAY WITH MEALS 180 tablet 3    amLODIPine (NORVASC) 5 MG tablet TAKE ONE TABLET BY MOUTH DAILY 90 tablet 3    levothyroxine (SYNTHROID) 50 MCG tablet Take 50 mcg by mouth daily      aspirin 81 MG chewable tablet Take 1 tablet by mouth daily 90 tablet 5    sodium bicarbonate 650 MG tablet Take 1 tablet by mouth 2 times daily 60 tablet 0    warfarin (COUMADIN) 7.5 MG tablet Take 7.5 mg by mouth daily Saturday take 5 MG. Take 7.5mg on all other days.  allopurinol (ZYLOPRIM) 300 MG tablet Take 1 tablet by mouth daily 90 tablet 1     No current facility-administered medications for this visit.        Physical Exam:   BP (!) 124/94   Pulse 68   Wt 291 lb (132 kg)   BMI 37.36 kg/m²   BP Readings from Last 3 Encounters:   06/30/21 (!) 124/94   03/12/21 (!) 148/118   10/23/20 120/80     Pulse Readings from Last 3 Encounters:   06/30/21 68   03/12/21 82   10/23/20 64     Wt Readings from Last 3 Encounters:   06/30/21 291 lb (132 kg)   03/12/21 295 lb 14.4 oz (134.2 kg) 10/23/20 293 lb 4.8 oz (133 kg)     Constitutional: He is oriented to person, place, and time. He appears well-developed and well-nourished. In no acute distress. HEENT: Normocephalic and atraumatic. Sclerae anicteric. No xanthelasmas. Conjunctiva white, no subconjunctival hemorrhage   External inspection of ears nose teeth & gums   Eyes:PERRLA EOM's intact. Neck: Neck supple. No JVD present. Carotids without bruits. No mass and no thyromegaly present. No lymphadenopathy present. Cardiovascular: RRR, normal S1 and S2; no murmur/gallop or rub, PMI nondisplaced  Pulmonary/Chest: Effort normal.  Lungs clear to auscultation. Chest wall nontender  Abdominal: soft, nontender, nondistended. + bowel sounds; no organomegaly or bruits. Aorta normal  Extremities: No edema, cyanosis, or clubbing. Pulses are 2+ radial/carotid/dorsalis pedis bilaterally. Cap refill brisk. Neurological: No cranial nerve deficit. Psychiatric: He has a normal mood and affect. His speech is normal and behavior is normal.        Lab Results   Component Value Date    TRIG 70 05/09/2019    HDL 55 05/09/2019    LDLCALC 81 05/09/2019    LABVLDL 14 05/09/2019     Lab Results   Component Value Date     03/12/2021    K 4.7 03/12/2021     03/12/2021    CO2 22 03/12/2021    BUN 34 03/12/2021    CREATININE 3.3 03/12/2021    GLUCOSE 112 03/12/2021    CALCIUM 9.2 03/12/2021      Lab Results   Component Value Date    WBC 6.8 03/12/2021    HGB 12.0 (L) 03/12/2021    HCT 36.6 (L) 03/12/2021    MCV 95.7 03/12/2021     03/12/2021     EKG on 10/3/2019 AV paced at 60/min. Stable pattern. Assessment:    Was in hosp  / abd stress test and [facility] 5/10/19 Left Main: Normal   Left Anterior Descending: Tortuosity with mild proximal calcification but no significant stenosis. There is a large septal  that has some ostial narrowing of 60-70%. Circumflex: Large vessel probably codominant.   Mild plaquing but no significant stenoses. Right Coronary: The vessel is probably codominant. Mild plaque but no significant stenosis. Left Ventriculogram: Ejection fraction 30-35%. Anterior wall apex is akinetic suggesting regional wall motion abnormality but we do not find a coronary lesion that would cause this problem. Assessment: Mild coronary artery disease but does not require intervention. Cardiomyopathy ischemic appearing with regional wall motion in the anterior wall and apex akinesia and reduced ejection fraction  Recommendations: Optimize medical care continue dual antiplatelet therapy. viewed most recent test with pt. with hx palpitations/ denies having any recent  palpitations      Hx : ICD: CM AV paced   EKG V paced rate 60  SJM CRT-D: interrogated on 3/2019  / 10% afib / placed in 2015 by Dr. Nathen Izquierdo      Mild moderate mitral regurg    Echo 2016 EF was 50-55% Mild biatrial enlargement.   Sinus of valsalva appears to be mildly dilated.   Mild-moderate mitral regurgitation is present.   Mild pulmonic regurgitation present. Echo PTV       SEN/CRI   sCr 3.4 >2.5>2.8  (usually 2-3 range)   Dr. Messi Shipley following      hx afib  [id number] Score for Atrial Fibrillation Stroke Risk    Risk   Factors   Component Value   C CHF Yes 1   H HTN Yes 1   A2 Age >= 75 No,  (80 y.o.) 0   D DM No 0   S2 Prior Stroke/TIA No 0   V Vascular Disease No 0   A Age 74-69 Yes,  (80 y.o.) 1   Sc Sex male 0     [id number]  Score   3   Anticoagulated INR 1.27 / on coumadin   / discussed Thomas Caballero does want to change  denies bleeding /  falls      HTN   Optimal     HLD   LDL      He is still on Coumadin for his atrial fibrillation. Tolerating it well. Plan:  No coronary lesions in need intervention. He is still on Coumadin therapy and tolerating it very well. Underlying atrial fibrillation. We will continue his current therapy as listed includes Coumadin. Return to see us in 6 months.   ..  Nino Hartmann MD, [address] [facility]

## 2021-07-02 RX ORDER — HYDRALAZINE HYDROCHLORIDE 10 MG/1
10 TABLET, FILM COATED ORAL 3 TIMES DAILY
Qty: 90 TABLET | Refills: 3 | Status: SHIPPED | OUTPATIENT
Start: 2021-07-02

## 2021-07-02 NOTE — TELEPHONE ENCOUNTER
Requested Prescriptions      No prescriptions requested or ordered in this encounter            Last Office Visit: 10/23/2020     Next Office Visit: 7/26/2021

## 2021-07-23 RX ORDER — SILDENAFIL 100 MG/1
TABLET, FILM COATED ORAL
Qty: 10 TABLET | Refills: 3 | Status: SHIPPED | OUTPATIENT
Start: 2021-07-23 | End: 2022-01-05 | Stop reason: SDUPTHER

## 2021-07-26 ENCOUNTER — NURSE ONLY (OUTPATIENT)
Dept: CARDIOLOGY CLINIC | Age: 71
End: 2021-07-26

## 2021-07-26 DIAGNOSIS — Z95.810 PRESENCE OF CARDIAC RESYNCHRONIZATION THERAPY DEFIBRILLATOR (CRT-D): ICD-10-CM

## 2021-07-26 DIAGNOSIS — I42.9 CARDIOMYOPATHY, UNSPECIFIED TYPE (HCC): ICD-10-CM

## 2021-07-26 PROCEDURE — 93295 DEV INTERROG REMOTE 1/2/MLT: CPT | Performed by: INTERNAL MEDICINE

## 2021-07-26 PROCEDURE — 93296 REM INTERROG EVL PM/IDS: CPT | Performed by: INTERNAL MEDICINE

## 2021-07-26 NOTE — LETTER
[address] [facility] [phone]  [address] [facility],[address] [phone]    Pacemaker/Defibrillator Clinic    07/27/21      [address] [facility] [address]  [address] [place] [address]      Dear Mounika Paul    This letter is to inform you that we received the transmission from your monitor at home that checks your implanted heart device. The next date your monitor will automatically transmit will be 10/25. If your report needs attention we will notify you. Your device and monitor are wireless and most transmit cellularly, but please periodically check your monitor is still plugged in to the electrical outlet. If you still use the telephone land line to send please ensure the connection to the phone lamar is secure. This will help to ensure successful automatic transmissions in the future. Also, the monitor needs to be close to you while sleeping at night. Please be aware that the remote device transmission sites are periodically monitored only during regular business hours during which simultaneous in-office device clinics are being run. If your transmission requires attention, we will contact you as soon as possible. **PLEASE NOTE** that our [facility] policy and processes are changing to ensure a more seamless approach for all parties involved, allowing more time for our nurses to address patient issues and concerns. We will no longer be sending letters for NORMAL remote transmissions. You will be contacted by phone if your transmission requires attention (as previously done), and letters will only be sent regarding monitor disconnections or missed transmissions if you are unable to be reached by phone. Please do not be alarmed by this new process, as we will continue to contact you if your transmission report requires attention. This will be your final \"remote received\" letter.   From this point forward, the [facility] will be utilizing the no news is good news approach. As always, please feel free to contact your nurse with any questions or concerns. Thank you.     [facility]

## 2021-08-07 DIAGNOSIS — I48.91 ATRIAL FIBRILLATION, UNSPECIFIED TYPE (HCC): ICD-10-CM

## 2021-08-07 DIAGNOSIS — I10 ESSENTIAL HYPERTENSION: ICD-10-CM

## 2021-08-07 DIAGNOSIS — I42.9 CARDIOMYOPATHY, UNSPECIFIED TYPE (HCC): ICD-10-CM

## 2021-08-09 RX ORDER — ISOSORBIDE MONONITRATE 30 MG/1
TABLET, EXTENDED RELEASE ORAL
Qty: 90 TABLET | Refills: 3 | Status: SHIPPED | OUTPATIENT
Start: 2021-08-09 | End: 2022-09-09 | Stop reason: SDUPTHER

## 2021-08-09 RX ORDER — AMLODIPINE BESYLATE 5 MG/1
TABLET ORAL
Qty: 90 TABLET | Refills: 3 | Status: SHIPPED | OUTPATIENT
Start: 2021-08-09

## 2021-08-30 ENCOUNTER — TELEPHONE (OUTPATIENT)
Dept: CARDIOLOGY CLINIC | Age: 71
End: 2021-08-30

## 2021-08-30 ENCOUNTER — ANTI-COAG VISIT (OUTPATIENT)
Dept: CARDIOLOGY CLINIC | Age: 71
End: 2021-08-30

## 2021-08-30 NOTE — PROGRESS NOTES
ANTICOAGULATION MONITORING    Dunia Gottlieb, 1950      Anticoagulation Indication PAF non valvular  Persistent range 2-3   Referring Physician:   Eduardo Wells  Goal INR Range:  2/3Duration of Anticoagulation Therapy:      Lab Draw:  Product patient has at home: warfarin 5  mg    Recent INR Results:  Lab Results   Component Value Date    INR 3.38 (H) 08/20/2021    INR 2.37 (H) 06/28/2021    INR 2.58 06/09/2021    INR 2.5 06/09/2021       INR Summary                          Warfarin regimen (mg)  Date INR A/P Sun Mon Tue Wed Thu Fri Sat Mg/wk   8/20/21 3.3 Above goa; 7.5 7.5  . 7.5 7.5 7.5 7.5 7.5 52.0   6/29/21 2.3 At goal 7.5 7.5 7.5 7.5 7.5 7.5 7.5 52.0   6/9/21 2.5 AT GOAL 7.5 7.5 7.5 7.5 7.5 7.5 7.5 52.0   5/24/21 4.1 Above goal 7.5 7.5 7.5  7.5 7.5 7.5 45.0   5/17/21 3.46 Above goal 7.5 7.5 5 7.5 7.5 7.5 7.5 37.5   5/6/21  1.5  Below goal 5/ 7.5 5 5 5 5 5 37.5   3/16/21 1.9 Below goal 7.5 7.5 5 7.5 7.5 5 7.5 47.5   3/12/21 2.59 At goal 7.5 7.5 5 7.5 7.5 5 7.5 47.5   3/4/21 4.6  Above goal 7.5 7.5 5 7.5 7.5 5 7.5 47.5   2/25/21 3.3 Above goal 7.5 7.5 7.5 7.5 7.5 7.5 5 50mg   2/22/21  4.8  Above goal 7.5 Hold  5 7.5 7.5 7.5` 7.5      2/16/21 3.6 Above goal, decrease by 2.5 mg 7.5 7.5 5 7.5 7.5 7.5 7.5 50   1/14/21 2.2 At goal 7.5 7.5 7.5 7.5 7.5 7.5 7.5 52.5   12/22/20 3.4 Above goal 7.5 10 7.5 7.5 7.5 7.5 7.5 55mg   12/7/20 3.2 Above goal 7.5 7.5 7.5 7.5 7.5 7.5 7.5 52.5   9/2/20 2.05 At goal, no change 7.5 7.5 7.5 7.5 7.5 7.5 7.5 52.5   7/11/19 1.1 Below goal 7.5mg 7.5mg 7.5mg 7.5mg 7.5mg 7.5mg 7.5mg 52.5   6/10/19 2.6 At goal 5mg 7.5mg 7.5mg 7.5mg 7.5mg 7.5mg 7.5mg 50 mg   5/23/19 1.3 Below goal 7.5mg 7.5mg 7.5mg 7.5mg 7.5mg 7.5mg 7.5mg 52.5   5/14/19 1.1 Below goal 5mg 7.5mg 7.5mg 7.5mg 7.5mg 7.5mg 7.5mg 50mg   4/29/19 1.9 Below goal 7.5mg 7.5mg 7.5mg 7.5mg 7.5mg 5mg 7.5mg 50mg   4/15/19 3.9 Above goal 7.5mg 7.5mg 5mg 7.5mg 7.5mg 5mg 7.5mg 47.5   4/3/19 4.3 Above goals 7.5mg 7.5mg 7.5mg 7.5mg 7.5mg hold hold 37.5   3/25/19 6.6 Above goals           3/7/19 1.0 Below goal 10mg 10mg 10mg 10mg 10mg 10mg 10mg 70mg         Recheck INR: 3.3  Patient states that he has been taking 7.5 mg qd. Instructed to CONTINUE ON SAME DOSE AND REPEAT INR ON 09/06/21. Patient denies etoh,asa products and antibiotics which would make above goal today.        Hubert Henriquez APRN, CVNP FNP

## 2021-09-22 ENCOUNTER — ANTI-COAG VISIT (OUTPATIENT)
Dept: CARDIOLOGY CLINIC | Age: 71
End: 2021-09-22
Payer: MEDICARE

## 2021-09-22 DIAGNOSIS — I48.0 PAROXYSMAL ATRIAL FIBRILLATION (HCC): ICD-10-CM

## 2021-09-22 PROCEDURE — 93793 ANTICOAG MGMT PT WARFARIN: CPT | Performed by: NURSE PRACTITIONER

## 2021-09-22 NOTE — PROGRESS NOTES
ANTICOAGULATION MONITORING    Nicolette Thomas, 1950      Anticoagulation Indication PAF non valvular  Persistent range 2-3   Referring Physician:   Carrie Stroud  Goal INR Range:  2/3Duration of Anticoagulation Therapy:      Lab Draw:  Product patient has at home: warfarin 5  mg    Recent INR Results:  Lab Results   Component Value Date    INR 1.70 (H) 09/22/2021    INR 3.38 (H) 08/20/2021    INR 2.37 (H) 06/28/2021    INR 2.58 06/09/2021       INR Summary                          Warfarin regimen (mg)  Date INR A/P Sun Mon Tue Wed Thu Fri Sat Mg/wk   9/22/21 1.7 Below goal 7.5 7.5 7.5 7.5 7.5 7.5 7.5 49.0   8/20/21 3.3 Above goa; 7.5 7.5  . 7.5 7.5 7.5 7.5 7.5 49.0   6/29/21 2.3 At goal 7.5 7.5 7.5 7.5 7.5 7.5 7.5 49.0   6/9/21 2.5 AT GOAL 7.5 7.5 7.5 7.5 7.5 7.5 7.5 49.0   5/24/21 4.1 Above goal 7.5 7.5 7.5  7.5 7.5 7.5 45.0   5/17/21 3.46 Above goal 7.5 7.5 5 7.5 7.5 7.5 7.5 37.5   5/6/21  1.5  Below goal 5/ 7.5 5 5 5 5 5 37.5   3/16/21 1.9 Below goal 7.5 7.5 5 7.5 7.5 5 7.5 47.5   3/12/21 2.59 At goal 7.5 7.5 5 7.5 7.5 5 7.5 47.5   3/4/21 4.6  Above goal 7.5 7.5 5 7.5 7.5 5 7.5 47.5   2/25/21 3.3 Above goal 7.5 7.5 7.5 7.5 7.5 7.5 5 50mg   2/22/21  4.8  Above goal 7.5 Hold  5 7.5 7.5 7.5` 7.5      2/16/21 3.6 Above goal, decrease by 2.5 mg 7.5 7.5 5 7.5 7.5 7.5 7.5 50   1/14/21 2.2 At goal 7.5 7.5 7.5 7.5 7.5 7.5 7.5 52.5   12/22/20 3.4 Above goal 7.5 10 7.5 7.5 7.5 7.5 7.5 55mg   12/7/20 3.2 Above goal 7.5 7.5 7.5 7.5 7.5 7.5 7.5 52.5   9/2/20 2.05 At goal, no change 7.5 7.5 7.5 7.5 7.5 7.5 7.5 52.5   7/11/19 1.1 Below goal 7.5mg 7.5mg 7.5mg 7.5mg 7.5mg 7.5mg 7.5mg 52.5   6/10/19 2.6 At goal 5mg 7.5mg 7.5mg 7.5mg 7.5mg 7.5mg 7.5mg 50 mg   5/23/19 1.3 Below goal 7.5mg 7.5mg 7.5mg 7.5mg 7.5mg 7.5mg 7.5mg 52.5   5/14/19 1.1 Below goal 5mg 7.5mg 7.5mg 7.5mg 7.5mg 7.5mg 7.5mg 50mg   4/29/19 1.9 Below goal 7.5mg 7.5mg 7.5mg 7.5mg 7.5mg 5mg 7.5mg 50mg   4/15/19 3.9 Above goal 7.5mg 7.5mg 5mg 7.5mg 7.5mg 5mg 7.5mg 47.5   4/3/19 4.3 Above goals 7.5mg 7.5mg 7.5mg 7.5mg 7.5mg hold hold 37.5   3/25/19 6.6 Above goals           3/7/19 1.0 Below goal 10mg 10mg 10mg 10mg 10mg 10mg 10mg 70mg         Recheck INR: 1.7  Patient states that he has been taking 7.5 mg qd. He is below goal today and states that he has been eating more salads. Instructed to CONTINUE ON SAME DOSE AND REPEAT INR ON 10/6/21 Patient was instructed to eat the same amount of leafy green vegetables everyday to avoid the fluctuation with protime results.        Antonio Smith APRN, CVNP FNP

## 2021-10-25 ENCOUNTER — NURSE ONLY (OUTPATIENT)
Dept: CARDIOLOGY CLINIC | Age: 71
End: 2021-10-25
Payer: MEDICARE

## 2021-10-25 DIAGNOSIS — I42.9 CARDIOMYOPATHY, UNSPECIFIED TYPE (HCC): ICD-10-CM

## 2021-10-25 DIAGNOSIS — Z95.810 PRESENCE OF CARDIAC RESYNCHRONIZATION THERAPY DEFIBRILLATOR (CRT-D): ICD-10-CM

## 2021-10-25 DIAGNOSIS — I10 ESSENTIAL HYPERTENSION: ICD-10-CM

## 2021-10-25 DIAGNOSIS — I48.91 ATRIAL FIBRILLATION, UNSPECIFIED TYPE (HCC): ICD-10-CM

## 2021-10-25 PROCEDURE — 93295 DEV INTERROG REMOTE 1/2/MLT: CPT | Performed by: INTERNAL MEDICINE

## 2021-10-25 PROCEDURE — 93296 REM INTERROG EVL PM/IDS: CPT | Performed by: INTERNAL MEDICINE

## 2021-10-26 RX ORDER — CARVEDILOL 25 MG/1
TABLET ORAL
Qty: 180 TABLET | Refills: 3 | Status: SHIPPED | OUTPATIENT
Start: 2021-10-26 | End: 2022-10-03

## 2021-10-26 NOTE — PROGRESS NOTES
We received remote transmission from patient's CRT-D monitor at home. Transmission shows normal sensing and pacing function. AT/AFL noted on AMS recordings, <1% burden, longest 1min (Coumadin, Coreg, amiodarone). Ap 92%  BiVp >99%    Corvue has slight decreasing trend noted. Pt has OV w [facility] 12.30.21. EP physician will review. See interrogation under cardiology tab in the [address] [place] [address] [address] field for more details. Will continue to monitor remotely.

## 2021-12-16 ENCOUNTER — TELEPHONE (OUTPATIENT)
Dept: CARDIOLOGY CLINIC | Age: 71
End: 2021-12-16

## 2021-12-16 NOTE — TELEPHONE ENCOUNTER
lmom for patient he is 3months past due on getting a protime INR and to return call to office and get lab work asap.

## 2021-12-18 NOTE — TELEPHONE ENCOUNTER
Ric Paredes with [facility] [address] called to find out patient's PT/INR and instructions. Patient has result in chart from yesterday. Please call her about this at [phone]. Thanks. 18-Dec-2021 17:55

## 2021-12-22 DIAGNOSIS — I42.9 CARDIOMYOPATHY, UNSPECIFIED TYPE (HCC): ICD-10-CM

## 2021-12-22 DIAGNOSIS — Z79.01 LONG TERM CURRENT USE OF ANTICOAGULANT: ICD-10-CM

## 2021-12-22 LAB
INR BLD: 1.94 (ref 0.88–1.12)
PROTHROMBIN TIME: 22.5 SEC (ref 9.9–12.7)

## 2021-12-27 ENCOUNTER — ANTI-COAG VISIT (OUTPATIENT)
Dept: CARDIOLOGY CLINIC | Age: 71
End: 2021-12-27
Payer: MEDICARE

## 2021-12-27 DIAGNOSIS — I48.0 PAROXYSMAL ATRIAL FIBRILLATION (HCC): ICD-10-CM

## 2021-12-27 PROCEDURE — 93793 ANTICOAG MGMT PT WARFARIN: CPT | Performed by: NURSE PRACTITIONER

## 2021-12-27 NOTE — PROGRESS NOTES
ANTICOAGULATION MONITORING    Elenita Overton, 1950      Anticoagulation Indication PAF non valvular  Persistent range 2-3   Referring Physician:   Beth Valverde  Goal INR Range:  2/3 Duration of Anticoagulation Therapy:      Lab Draw:  Product patient has at home: warfarin 5  mg    Recent INR Results:  Lab Results   Component Value Date    INR 1.94 (H) 12/22/2021    INR 1.70 (H) 09/22/2021    INR 3.38 (H) 08/20/2021    INR 2.37 (H) 06/28/2021       INR Summary                          Warfarin regimen (mg)  Date INR A/P Sun Mon Tue Wed Thu Fri Sat Mg/wk   12/22/21 1.9 Below goal 7.5 7.5 7.5 7.5 7.5 7.5 7.5 49.0   9/22/21 1.7 Below goal 7.5 7.5 7.5 7.5 7.5 7.5 7.5 49.0   8/20/21 3.3 Above goa; 7.5 7.5  . 7.5 7.5 7.5 7.5 7.5 49.0   6/29/21 2.3 At goal 7.5 7.5 7.5 7.5 7.5 7.5 7.5 49.0   6/9/21 2.5 AT GOAL 7.5 7.5 7.5 7.5 7.5 7.5 7.5 49.0   5/24/21 4.1 Above goal 7.5 7.5 7.5  7.5 7.5 7.5 45.0   5/17/21 3.46 Above goal 7.5 7.5 5 7.5 7.5 7.5 7.5 37.5   5/6/21  1.5  Below goal 5/ 7.5 5 5 5 5 5 37.5   3/16/21 1.9 Below goal 7.5 7.5 5 7.5 7.5 5 7.5 47.5   3/12/21 2.59 At goal 7.5 7.5 5 7.5 7.5 5 7.5 47.5   3/4/21 4.6  Above goal 7.5 7.5 5 7.5 7.5 5 7.5 47.5   2/25/21 3.3 Above goal 7.5 7.5 7.5 7.5 7.5 7.5 5 50mg   2/22/21  4.8  Above goal 7.5 Hold  5 7.5 7.5 7.5` 7.5      2/16/21 3.6 Above goal, decrease by 2.5 mg 7.5 7.5 5 7.5 7.5 7.5 7.5 50   1/14/21 2.2 At goal 7.5 7.5 7.5 7.5 7.5 7.5 7.5 52.5   12/22/20 3.4 Above goal 7.5 10 7.5 7.5 7.5 7.5 7.5 55mg   12/7/20 3.2 Above goal 7.5 7.5 7.5 7.5 7.5 7.5 7.5 52.5   9/2/20 2.05 At goal, no change 7.5 7.5 7.5 7.5 7.5 7.5 7.5 52.5   7/11/19 1.1 Below goal 7.5mg 7.5mg 7.5mg 7.5mg 7.5mg 7.5mg 7.5mg 52.5   6/10/19 2.6 At goal 5mg 7.5mg 7.5mg 7.5mg 7.5mg 7.5mg 7.5mg 50 mg   5/23/19 1.3 Below goal 7.5mg 7.5mg 7.5mg 7.5mg 7.5mg 7.5mg 7.5mg 52.5   5/14/19 1.1 Below goal 5mg 7.5mg 7.5mg 7.5mg 7.5mg 7.5mg 7.5mg 50mg   4/29/19 1.9 Below goal 7.5mg 7.5mg 7.5mg 7.5mg 7.5mg 5mg 7.5mg 50mg 4/15/19 3.9 Above goal 7.5mg 7.5mg 5mg 7.5mg 7.5mg 5mg 7.5mg 47.5   4/3/19 4.3 Above goals 7.5mg 7.5mg 7.5mg 7.5mg 7.5mg hold hold 37.5   3/25/19 6.6 Above goals           3/7/19 1.0 Below goal 10mg 10mg 10mg 10mg 10mg 10mg 10mg 70mg         Recheck INR: 1.9  Patient states that he has been taking 7.5 mg qd. He is below goal today and states that he has been eating more salads. Instructed to CONTINUE ON SAME DOSE AND REPEAT INR ON 1/11/22 Patient was instructed to eat the same amount of leafy green vegetables everyday to avoid the fluctuation with protime results.        AUSTIN Platt     Reviewed INR   At goal       AUSTIN Platt

## 2021-12-30 ENCOUNTER — OFFICE VISIT (OUTPATIENT)
Dept: CARDIOLOGY CLINIC | Age: 71
End: 2021-12-30
Payer: MEDICARE

## 2021-12-30 VITALS
DIASTOLIC BLOOD PRESSURE: 78 MMHG | HEART RATE: 80 BPM | SYSTOLIC BLOOD PRESSURE: 104 MMHG | WEIGHT: 288 LBS | BODY MASS INDEX: 36.98 KG/M2

## 2021-12-30 DIAGNOSIS — Z92.89 H/O ANGIOGRAPHY: ICD-10-CM

## 2021-12-30 DIAGNOSIS — Z95.810 PRESENCE OF CARDIAC RESYNCHRONIZATION THERAPY DEFIBRILLATOR (CRT-D): Primary | ICD-10-CM

## 2021-12-30 PROCEDURE — 93000 ELECTROCARDIOGRAM COMPLETE: CPT | Performed by: INTERNAL MEDICINE

## 2021-12-30 PROCEDURE — 99214 OFFICE O/P EST MOD 30 MIN: CPT | Performed by: INTERNAL MEDICINE

## 2021-12-30 NOTE — PROGRESS NOTES
[address]  Office Visit           Guilherme Molina MD,  [facility]                     Cardiology           Ana Claros  1950 December 30, 2021         HPI:  The patient is 73 y.o. male with CM  palpitations/Pt has [employer] Bi- V ICD w corvue implanted by Dr. Melina Artis. Placed in 2015 by Dr. Ranjana Alexandra the patient is stable today without complaints of problems. Recently replaced by Dr. Melina Artis on 7/7/2020. He is clinically doing well at this time. He did have the coronavirus infection back in September. Did subsequently have the coronavirus vaccine [employer] x2. No current issues at this time. Did have a cardiac cath May 2019 and no need for acute intervention. Atrial fibrillation at his baseline but he does have a pacemaker device defibrillator that is in position and seems to be working continuously. Continue current therapy. He otherwise feels fine has gained about 16 pounds and will try to get his weight down further. Did have coronavirus infection 3 months ago. Subsequently had vaccine with [employer]. CM av paced /   hx PAF AV paced   CRD follows Dr. Hawkins Board   Obesity /dicussed activity nutrition / has lost over 200#   NICM / no c/o PND or SULMA   SJM CRT-D: interrogated on 3/2019  / 10% afib / placed in 2015 by Dr. Ranjana Alexandra   us renals 5/9/19   Medical renal disease. No hydronephrosis   Probable incidental splenic cavernous hemangioma     Echo 2016 EF was 50-55% Mild biatrial enlargement.   Sinus of valsalva appears to be mildly dilated.   Mild-moderate mitral regurgitation is present.   Mild pulmonic regurgitation present. [facility] 5/10/19 Left Main: Normal   Left Anterior Descending: Tortuosity with mild proximal calcification but no significant stenosis. There is a large septal  that has some ostial narrowing of 60-70%. Circumflex: Large vessel probably codominant. Mild plaquing but no significant stenoses.   Right Coronary: The vessel is probably codominant. Mild plaque but no significant stenosis. Left Ventriculogram: Ejection fraction 30-35%. Anterior wall apex is akinetic suggesting regional wall motion abnormality but we do not find a coronary lesion that would cause this problem. Assessment: Mild coronary artery disease but does not require intervention. Cardiomyopathy ischemic appearing with regional wall motion in the anterior wall and apex akinesia and reduced ejection fraction  Recommendations: Optimize medical care continue dual antiplatelet therapy. viewed most recent test with pt. with hx palpitations/ denies having any recent  palpitations      Review of Systems:  Constitutional: Denies  fatigue, weakness, night sweats or fever. HEENT: Denies new visual changes, ringing in ears, nosebleeds,nasal congestion  Respiratory: Denies new or change in SOB, PND, orthopnea or cough. Cardiovascular: see HPI  GI: Denies N/V, diarrhea, constipation, abdominal pain, change in bowel habits, melena or hematochezia  : Denies urinary frequency, urgency, incontinence, hematuria or dysuria. Skin: Denies rash, hives, or cyanosis  Musculoskeletal: Denies joint or muscle aches/pain  Neurological: Denies syncope or TIA-like symptoms.   Psychiatric: Denies anxiety, insomnia or depression     Past Medical History:   Diagnosis Date    Atrial fibrillation ([facility]     CHF (congestive heart failure) ([facility])     Dr. Rausch(cardiologist)    CKD (chronic kidney disease) stage 3, GFR 30-59 ml/min ([facility])     Fracture     R index finger and L ankle    Gout     Hnands/feet/elbows    Hypertension     Pacemaker      Past Surgical History:   Procedure Laterality Date    CARDIAC CATHETERIZATION       Family History   Problem Relation Age of Onset    Heart Disease Father         CHF- of    High Blood Pressure Father     Heart Disease Mother     High Blood Pressure Mother     High Blood Pressure Brother         Stent placement x2     Social History Tobacco Use    Smoking status: Passive Smoke Exposure - Never Smoker    Smokeless tobacco: Never Used   Substance Use Topics    Alcohol use: Yes     Alcohol/week: 0.0 standard drinks     Comment: occasionally    Drug use: Yes     Comment: Quit using in 2007       Allergies   Allergen Reactions    Peanut-Containing Drug Products Anaphylaxis    Penicillins      Current Outpatient Medications   Medication Sig Dispense Refill    carvedilol (COREG) 25 MG tablet TAKE ONE TABLET BY MOUTH TWICE A DAY WITH MEALS 180 tablet 3    amLODIPine (NORVASC) 5 MG tablet TAKE ONE TABLET BY MOUTH DAILY 90 tablet 3    isosorbide mononitrate (IMDUR) 30 MG extended release tablet TAKE ONE TABLET BY MOUTH DAILY 90 tablet 3    sildenafil (VIAGRA) 100 MG tablet TAKE ONE TABLET BY MOUTH DAILY AS NEEDED FOR ERECTILE DYSFUNCTION 10 tablet 3    hydrALAZINE (APRESOLINE) 10 MG tablet Take 1 tablet by mouth 3 times daily 90 tablet 3    warfarin (COUMADIN) 5 MG tablet Take 1 1/2 tab daily or as directed by physician 180 tablet 3    amiodarone (CORDARONE) 200 MG tablet Take one tablet by mouth daily 90 tablet 3    chlorthalidone (HYGROTON) 25 MG tablet Take 25 mg by mouth daily      magnesium oxide (MAG-OX) 400 (241.3 Mg) MG TABS tablet TAKE ONE TABLET BY MOUTH TWICE A DAY 60 tablet 5    levothyroxine (SYNTHROID) 50 MCG tablet Take 50 mcg by mouth daily      aspirin 81 MG chewable tablet Take 1 tablet by mouth daily 90 tablet 5    sodium bicarbonate 650 MG tablet Take 1 tablet by mouth 2 times daily 60 tablet 0    warfarin (COUMADIN) 7.5 MG tablet Take 7.5 mg by mouth daily Saturday take 5 MG. Take 7.5mg on all other days.  allopurinol (ZYLOPRIM) 300 MG tablet Take 1 tablet by mouth daily 90 tablet 1     No current facility-administered medications for this visit.        Physical Exam:   /78   Pulse 80   Wt 288 lb (130.6 kg)   BMI 36.98 kg/m²   BP Readings from Last 3 Encounters:   12/30/21 104/78   06/30/21 (!) 124/94   03/12/21 (!) 148/118     Pulse Readings from Last 3 Encounters:   12/30/21 80   06/30/21 68   03/12/21 82     Wt Readings from Last 3 Encounters:   12/30/21 288 lb (130.6 kg)   06/30/21 291 lb (132 kg)   03/12/21 295 lb 14.4 oz (134.2 kg)     Constitutional: He is oriented to person, place, and time. He appears well-developed and well-nourished. In no acute distress. HEENT: Normocephalic and atraumatic. Sclerae anicteric. No xanthelasmas. Conjunctiva white, no subconjunctival hemorrhage   External inspection of ears nose teeth & gums   Eyes:PERRLA EOM's intact. Neck: Neck supple. No JVD present. Carotids without bruits. No mass and no thyromegaly present. No lymphadenopathy present. Cardiovascular: RRR, normal S1 and S2; no murmur/gallop or rub, PMI nondisplaced  Pulmonary/Chest: Effort normal.  Lungs clear to auscultation. Chest wall nontender  Abdominal: soft, nontender, nondistended. + bowel sounds; no organomegaly or bruits. Aorta normal  Extremities: No edema, cyanosis, or clubbing. Pulses are 2+ radial/carotid/dorsalis pedis bilaterally. Cap refill brisk. Neurological: No cranial nerve deficit. Psychiatric: He has a normal mood and affect. His speech is normal and behavior is normal.        Lab Results   Component Value Date    TRIG 70 05/09/2019    HDL 55 05/09/2019    LDLCALC 81 05/09/2019    LABVLDL 14 05/09/2019     Lab Results   Component Value Date     03/12/2021    K 4.7 03/12/2021     03/12/2021    CO2 22 03/12/2021    BUN 34 03/12/2021    CREATININE 3.3 03/12/2021    GLUCOSE 112 03/12/2021    CALCIUM 9.2 03/12/2021      Lab Results   Component Value Date    WBC 6.8 03/12/2021    HGB 12.0 (L) 03/12/2021    HCT 36.6 (L) 03/12/2021    MCV 95.7 03/12/2021     03/12/2021     EKG on 10/3/2019 AV paced at 60/min. Stable pattern. Assessment:    Was in hosp  / abd stress test and [facility] 5/10/19 Left Main: Normal   Left Anterior Descending:  Tortuosity with mild proximal calcification but no significant stenosis. There is a large septal  that has some ostial narrowing of 60-70%. Circumflex: Large vessel probably codominant. Mild plaquing but no significant stenoses. Right Coronary: The vessel is probably codominant. Mild plaque but no significant stenosis. Left Ventriculogram: Ejection fraction 30-35%. Anterior wall apex is akinetic suggesting regional wall motion abnormality but we do not find a coronary lesion that would cause this problem. Assessment: Mild coronary artery disease but does not require intervention. Cardiomyopathy ischemic appearing with regional wall motion in the anterior wall and apex akinesia and reduced ejection fraction  Recommendations: Optimize medical care continue dual antiplatelet therapy. viewed most recent test with pt. with hx palpitations/ denies having any recent  palpitations      Hx : ICD: CM AV paced   EKG V paced rate 60  SJM CRT-D: interrogated on 3/2019  / 10% afib / placed in 2015 by Dr. Marlo Candelario  EKG on 12/30/2021 AV paced at 60/min. Mild moderate mitral regurg    Echo 2016 EF was 50-55% Mild biatrial enlargement.   Sinus of valsalva appears to be mildly dilated.   Mild-moderate mitral regurgitation is present.   Mild pulmonic regurgitation present.     Echo PTV                                                                                                                                                                   SEN/CRI   sCr 3.4 >2.5>2.8  (usually 2-3 range)   Dr. Wise Fueligor following      hx afib  [id number] Score for Atrial Fibrillation Stroke Risk    Risk   Factors   Component Value   C CHF Yes 1   H HTN Yes 1   A2 Age >= 75 No,  (80 y.o.) 0   D DM No 0   S2 Prior Stroke/TIA No 0   V Vascular Disease No 0   A Age 74-69 Yes,  (80 y.o.) 1   Sc Sex male 0     [id number]  Score   3   Anticoagulated INR 1.27 / on coumadin   / discussed Acie [place] does want to change  denies bleeding /  falls      HTN Optimal     HLD   LDL      He is still on Coumadin for his atrial fibrillation. Tolerating it well. Plan:  He wishes to transition from Coumadin to Eliquis and we will try to make that happen for him. Eliquis 5 mg twice daily. Stop doing the prothrombin times. Return to see us in 3 months. ..   Hamlet Delgado MD, [facility]

## 2022-01-04 NOTE — TELEPHONE ENCOUNTER
I Medication Refills:      sildenafil (VIAGRA) 100 MG tablet  TAKE ONE TABLET BY MOUTH DAILY AS NEEDED FOR ERECTILE DYSFUNCTION, Disp-10 tablet,   Pharmacy:[facility] [address] [place] [address] [address] [phone] [place] Berger [phone]      Last Office Visit: 12.30.2021    Next Office Visit:  5.2.22    Last Refill:   07.23.21    Last Labs: 0312.21

## 2022-01-05 RX ORDER — SILDENAFIL 100 MG/1
TABLET, FILM COATED ORAL
Qty: 10 TABLET | Refills: 3 | Status: SHIPPED | OUTPATIENT
Start: 2022-01-05 | End: 2022-05-12

## 2022-01-17 ENCOUNTER — TELEPHONE (OUTPATIENT)
Dept: CARDIOLOGY CLINIC | Age: 72
End: 2022-01-17

## 2022-01-17 NOTE — TELEPHONE ENCOUNTER
Patient is having nose and mouth bleeds. Patient switched from warfarin to eliquis  over a week ago.    Please call pt at [phone]

## 2022-01-18 DIAGNOSIS — I10 ESSENTIAL HYPERTENSION: ICD-10-CM

## 2022-01-18 DIAGNOSIS — Z95.0 PACEMAKER: ICD-10-CM

## 2022-01-18 DIAGNOSIS — I48.0 PAROXYSMAL ATRIAL FIBRILLATION (HCC): ICD-10-CM

## 2022-01-18 NOTE — TELEPHONE ENCOUNTER
I called Gareth Stoddard state he had a nose  bleed x 2 / dr due to winter? ? y  He was holding his eliquis   Restart Eliquis and hold any fish oil / asa/ motrin/advil/ aleve   Tylenol only  Call if any more bleeding may need to get CBC and  consider ENT consult

## 2022-02-07 RX ORDER — APIXABAN 5 MG/1
TABLET, FILM COATED ORAL
Qty: 180 TABLET | Refills: 3 | Status: SHIPPED | OUTPATIENT
Start: 2022-02-07

## 2022-03-24 ENCOUNTER — NURSE ONLY (OUTPATIENT)
Dept: CARDIOLOGY CLINIC | Age: 72
End: 2022-03-24
Payer: MEDICARE

## 2022-03-24 DIAGNOSIS — I48.0 PAROXYSMAL ATRIAL FIBRILLATION (HCC): ICD-10-CM

## 2022-03-24 DIAGNOSIS — I42.9 CARDIOMYOPATHY, UNSPECIFIED TYPE (HCC): ICD-10-CM

## 2022-03-24 DIAGNOSIS — Z95.810 PRESENCE OF CARDIAC RESYNCHRONIZATION THERAPY DEFIBRILLATOR (CRT-D): ICD-10-CM

## 2022-03-24 PROCEDURE — 93295 DEV INTERROG REMOTE 1/2/MLT: CPT | Performed by: INTERNAL MEDICINE

## 2022-03-24 PROCEDURE — 93296 REM INTERROG EVL PM/IDS: CPT | Performed by: INTERNAL MEDICINE

## 2022-03-24 NOTE — PROGRESS NOTES
We received remote transmission from patient's CRT-D monitor at home. Transmission shows normal sensing and pacing function. AT/AFL noted, <1% burden, longest 5.25hrs (Eliquis, Coreg, amiodarone). Ap 88%  BiVp >99%    Corvue slightly below reference line w upward trend noted. EP physician will review. See interrogation under cardiology tab in the [address] [place] [address] [address] field for more details. Will continue to monitor remotely.

## 2022-03-29 ENCOUNTER — TELEPHONE (OUTPATIENT)
Dept: CARDIOLOGY CLINIC | Age: 72
End: 2022-03-29

## 2022-03-29 NOTE — TELEPHONE ENCOUNTER
Eddi Sees,   Can we contact patient for a NP OV/device check in [facility]? Maybe when [place] Ely is here? Thanks.     ----- Message from [employer] sent at 2/23/2022  4:18 PM EST -----  Last EP/device OV 10.2020. Pt typically sees [facility], but maybe an OV at [facility] w NP/device can help get him back on track w routine checks.

## 2022-03-29 NOTE — TELEPHONE ENCOUNTER
I called the patient's home and mobile number and I was unable to reach the patient. I left a vm on the home phone, no option to leave a vm on the mobile. I also called the patient's spouse Purvi's number and it just rings no vm picked up. I will try to reach out again.

## 2022-05-02 DIAGNOSIS — I10 ESSENTIAL HYPERTENSION: ICD-10-CM

## 2022-05-02 DIAGNOSIS — I48.91 ATRIAL FIBRILLATION, UNSPECIFIED TYPE (HCC): ICD-10-CM

## 2022-05-02 DIAGNOSIS — I42.9 CARDIOMYOPATHY, UNSPECIFIED TYPE (HCC): ICD-10-CM

## 2022-05-06 DIAGNOSIS — I48.91 ATRIAL FIBRILLATION, UNSPECIFIED TYPE (HCC): ICD-10-CM

## 2022-05-06 DIAGNOSIS — I42.9 CARDIOMYOPATHY, UNSPECIFIED TYPE (HCC): ICD-10-CM

## 2022-05-06 DIAGNOSIS — I10 ESSENTIAL HYPERTENSION: ICD-10-CM

## 2022-05-06 RX ORDER — AMIODARONE HYDROCHLORIDE 200 MG/1
TABLET ORAL
Qty: 90 TABLET | Refills: 3 | Status: CANCELLED | OUTPATIENT
Start: 2022-05-06

## 2022-05-06 NOTE — TELEPHONE ENCOUNTER
[facility] Medication Refills:    Medication: Amiodarone 200 MG    Dosage:  Take 1 tablet by mouth daily    Number: 90    Refills: 3    Last Office Visit: 12/30/2021    Next Office Visit: 06/03/2022    Last Labs: 12/22/2021 PT INR

## 2022-05-11 DIAGNOSIS — I42.9 CARDIOMYOPATHY, UNSPECIFIED TYPE (HCC): ICD-10-CM

## 2022-05-11 DIAGNOSIS — I10 ESSENTIAL HYPERTENSION: ICD-10-CM

## 2022-05-11 DIAGNOSIS — I48.91 ATRIAL FIBRILLATION, UNSPECIFIED TYPE (HCC): ICD-10-CM

## 2022-05-11 RX ORDER — AMIODARONE HYDROCHLORIDE 200 MG/1
TABLET ORAL
Qty: 90 TABLET | Refills: 3 | Status: CANCELLED | OUTPATIENT
Start: 2022-05-11

## 2022-05-11 NOTE — TELEPHONE ENCOUNTER
Requested Prescriptions     Pending Prescriptions Disp Refills    amiodarone (CORDARONE) 200 MG tablet 90 tablet 3     Sig: Take one tablet by mouth daily              Last Office Visit: 12/30/2021    Next Office Visit: 6/30/2022         Last Labs:TSH 3/9/2021

## 2022-05-12 RX ORDER — SILDENAFIL 100 MG/1
TABLET, FILM COATED ORAL
Qty: 10 TABLET | Refills: 3 | Status: SHIPPED | OUTPATIENT
Start: 2022-05-12

## 2022-05-25 DIAGNOSIS — I48.91 ATRIAL FIBRILLATION, UNSPECIFIED TYPE (HCC): ICD-10-CM

## 2022-05-25 DIAGNOSIS — I10 ESSENTIAL HYPERTENSION: ICD-10-CM

## 2022-05-25 DIAGNOSIS — I42.9 CARDIOMYOPATHY, UNSPECIFIED TYPE (HCC): ICD-10-CM

## 2022-05-25 RX ORDER — AMIODARONE HYDROCHLORIDE 200 MG/1
TABLET ORAL
Qty: 90 TABLET | Refills: 3 | Status: SHIPPED | OUTPATIENT
Start: 2022-05-25

## 2022-05-25 NOTE — TELEPHONE ENCOUNTER
Requested Prescriptions      No prescriptions requested or ordered in this encounter            Last Office Visit: 12/30/21    Next Office Visit: 6/30/2022

## 2022-05-31 ENCOUNTER — ANESTHESIA (OUTPATIENT)
Dept: ENDOSCOPY | Age: 72
End: 2022-05-31
Payer: MEDICARE

## 2022-05-31 ENCOUNTER — HOSPITAL ENCOUNTER (OUTPATIENT)
Age: 72
Setting detail: OUTPATIENT SURGERY
Discharge: HOME | End: 2022-05-31
Attending: INTERNAL MEDICINE | Admitting: INTERNAL MEDICINE
Payer: MEDICARE

## 2022-05-31 ENCOUNTER — HOSPITAL ENCOUNTER (OUTPATIENT)
Dept: ENDOSCOPY | Age: 72
Setting detail: OUTPATIENT SURGERY
Discharge: HOME | End: 2022-05-31

## 2022-05-31 ENCOUNTER — ANESTHESIA EVENT (OUTPATIENT)
Dept: ENDOSCOPY | Age: 72
End: 2022-05-31
Payer: MEDICARE

## 2022-05-31 VITALS
RESPIRATION RATE: 18 BRPM | OXYGEN SATURATION: 100 % | DIASTOLIC BLOOD PRESSURE: 120 MMHG | HEART RATE: 60 BPM | BODY MASS INDEX: 35.78 KG/M2 | HEIGHT: 73 IN | TEMPERATURE: 96.6 F | SYSTOLIC BLOOD PRESSURE: 157 MMHG | WEIGHT: 270 LBS

## 2022-05-31 DIAGNOSIS — Z12.11 SCREEN FOR COLON CANCER: ICD-10-CM

## 2022-05-31 PROCEDURE — 3700000001 HC ADD 15 MINUTES (ANESTHESIA): Performed by: INTERNAL MEDICINE

## 2022-05-31 PROCEDURE — 3700000000 HC ANESTHESIA ATTENDED CARE: Performed by: INTERNAL MEDICINE

## 2022-05-31 PROCEDURE — 2709999900 HC NON-CHARGEABLE SUPPLY: Performed by: INTERNAL MEDICINE

## 2022-05-31 PROCEDURE — 7100000010 HC PHASE II RECOVERY - FIRST 15 MIN: Performed by: INTERNAL MEDICINE

## 2022-05-31 PROCEDURE — 3609010600 HC COLONOSCOPY POLYPECTOMY SNARE/COLD BIOPSY: Performed by: INTERNAL MEDICINE

## 2022-05-31 PROCEDURE — 88305 TISSUE EXAM BY PATHOLOGIST: CPT

## 2022-05-31 PROCEDURE — 2720000010 HC SURG SUPPLY STERILE: Performed by: INTERNAL MEDICINE

## 2022-05-31 PROCEDURE — 2580000003 HC RX 258: Performed by: ANESTHESIOLOGY

## 2022-05-31 PROCEDURE — 6360000002 HC RX W HCPCS: Performed by: NURSE ANESTHETIST, CERTIFIED REGISTERED

## 2022-05-31 PROCEDURE — 7100000011 HC PHASE II RECOVERY - ADDTL 15 MIN: Performed by: INTERNAL MEDICINE

## 2022-05-31 RX ORDER — SODIUM CHLORIDE, SODIUM LACTATE, POTASSIUM CHLORIDE, CALCIUM CHLORIDE 600; 310; 30; 20 MG/100ML; MG/100ML; MG/100ML; MG/100ML
INJECTION, SOLUTION INTRAVENOUS CONTINUOUS
Status: DISCONTINUED | OUTPATIENT
Start: 2022-05-31 | End: 2022-05-31 | Stop reason: HOSPADM

## 2022-05-31 RX ORDER — SODIUM CHLORIDE 0.9 % (FLUSH) 0.9 %
5-40 SYRINGE (ML) INJECTION EVERY 12 HOURS SCHEDULED
Status: DISCONTINUED | OUTPATIENT
Start: 2022-05-31 | End: 2022-05-31 | Stop reason: HOSPADM

## 2022-05-31 RX ORDER — PROPOFOL 10 MG/ML
INJECTION, EMULSION INTRAVENOUS CONTINUOUS PRN
Status: DISCONTINUED | OUTPATIENT
Start: 2022-05-31 | End: 2022-05-31 | Stop reason: SDUPTHER

## 2022-05-31 RX ORDER — SODIUM CHLORIDE 9 MG/ML
INJECTION, SOLUTION INTRAVENOUS PRN
Status: DISCONTINUED | OUTPATIENT
Start: 2022-05-31 | End: 2022-05-31 | Stop reason: HOSPADM

## 2022-05-31 RX ORDER — LIDOCAINE HYDROCHLORIDE 10 MG/ML
1 INJECTION, SOLUTION EPIDURAL; INFILTRATION; INTRACAUDAL; PERINEURAL
Status: DISCONTINUED | OUTPATIENT
Start: 2022-05-31 | End: 2022-05-31 | Stop reason: HOSPADM

## 2022-05-31 RX ORDER — SODIUM CHLORIDE 0.9 % (FLUSH) 0.9 %
5-40 SYRINGE (ML) INJECTION PRN
Status: DISCONTINUED | OUTPATIENT
Start: 2022-05-31 | End: 2022-05-31 | Stop reason: HOSPADM

## 2022-05-31 RX ORDER — LIDOCAINE HYDROCHLORIDE 20 MG/ML
INJECTION, SOLUTION INTRAVENOUS PRN
Status: DISCONTINUED | OUTPATIENT
Start: 2022-05-31 | End: 2022-05-31 | Stop reason: SDUPTHER

## 2022-05-31 RX ORDER — PROPOFOL 10 MG/ML
INJECTION, EMULSION INTRAVENOUS PRN
Status: DISCONTINUED | OUTPATIENT
Start: 2022-05-31 | End: 2022-05-31 | Stop reason: SDUPTHER

## 2022-05-31 RX ADMIN — SODIUM CHLORIDE, POTASSIUM CHLORIDE, SODIUM LACTATE AND CALCIUM CHLORIDE: 600; 310; 30; 20 INJECTION, SOLUTION INTRAVENOUS at 11:42

## 2022-05-31 RX ADMIN — LIDOCAINE HYDROCHLORIDE 100 MG: 20 INJECTION, SOLUTION INTRAVENOUS at 11:56

## 2022-05-31 RX ADMIN — PROPOFOL 150 MG: 10 INJECTION, EMULSION INTRAVENOUS at 11:56

## 2022-05-31 RX ADMIN — PROPOFOL 125 MCG/KG/MIN: 10 INJECTION, EMULSION INTRAVENOUS at 11:56

## 2022-05-31 ASSESSMENT — PAIN - FUNCTIONAL ASSESSMENT: PAIN_FUNCTIONAL_ASSESSMENT: 0-10

## 2022-05-31 ASSESSMENT — PAIN SCALES - GENERAL
PAINLEVEL_OUTOF10: 0

## 2022-05-31 NOTE — PROCEDURES
Colonoscopy REPORT    Patient:  Eduardo Quintero                  1950    Referring Physician:  Ghulam Urbina    Endoscopist: Jayme Byrne     Indication:  Screening      Medications:  MAC      Pre-Anesthesia Assessment:  I have reviewed and am in agreement with patient history and medication, including previous response to sedation. Prior to the procedure, a History and Physical was performed, and patient medications and allergies were reviewed. The risks and benefits of the procedure and the sedation options and risks were discussed with the patient. Complications included but were not limited to infection, bleeding, perforation, death, and missed lesions. All questions were answered and informed consent was obtained by the patient. Patient identification and proposed procedure were verified by the physician and the nurse in the pre-procedure area and in the procedure room. Mallampatti: II  ASA Grade Assessment: 3    After reviewing the risks and benefits, the patient was deemed in satisfactory condition to undergo the procedure. The anesthesia plan was to use MAC sedation. Immediately prior to administration of medications, the patient was re-assessed for adequacy to receive sedatives and a time out was performed. Patient and healthcare providers were in agreement it was the correct patient and procedure. The heart rate, respiratory rate, oxygen saturations, blood pressure, adequacy of pulmonary ventilation, and response to care were monitored throughout the procedure. The physical status of the patient was re-assessed after the procedure. After adequate sedation was achieved in stepwise fashion a rectal examination was performed and showed hemorrhoids. The pediatric colonoscope was then advanced atraumatically into the rectum and advanced without difficulty to the cecum. The cecum was identified by the appendiceal orifice the ileocecal valve and other normal anatomy and a picture was obtained.       The scope was then withdrawn (>6minutes) with close inspection of the mucosa in a circumferential manor. Retroflexed views under the ICV and of the right colon obtained. Mild right and moderate left sided diverticulosis. Multiple small to large angioectasias in the right colon. 4mm polyp in the ascending colon removed with cold snare. This oozed. A clip was placed and hemostasis was achieved. Retroflexed views of the rectum show hemorrhoids. No immediate complications. The preparation was good.    Estimated blood loss none    Impression:  1 polyp  Multiple angioectasias  Diverticulosis (L>R)  hemorrhoids  Plan:  High fiber diet  Would not repeat colonoscopy given age and comorbidities

## 2022-05-31 NOTE — ANESTHESIA POSTPROCEDURE EVALUATION
Department of Anesthesiology  Postprocedure Note    Patient: Maverick Frazier  MRN: [id number]  YOB: 1950  Date of evaluation: 5/31/2022  Time:  1:14 PM     Procedure Summary     Date: 05/31/22 Room / Location: [facility]    Anesthesia Start: 1069 Anesthesia Stop: 1217    Procedure: COLONOSCOPY POLYPECTOMY SNARE/COLD BIOPSY Diagnosis:       Screen for colon cancer      (Screen for colon cancer [Z12.11])    Surgeons: Salud Mendez MD Responsible Provider: Juana Myers MD    Anesthesia Type: MAC ASA Status: 3          Anesthesia Type: No value filed. Janeth Phase I: Janeth Score: 10    Janeth Phase II: Janeth Score: 10    Last vitals: Reviewed and per EMR flowsheets.        Anesthesia Post Evaluation    Patient location during evaluation: PACU  Level of consciousness: awake  Complications: no  Multimodal analgesia pain management approach

## 2022-05-31 NOTE — H&P
Pre-operative History and Physical    Patient: Cheng Arteaga  : 1950     History Obtained From:  patient, electronic medical record    HISTORY OF PRESENT ILLNESS:    The patient is a 81 y.o. male who presents for a colonoscopy for screening. Past Medical History:        Diagnosis Date    Atrial fibrillation ([address]     CHF (congestive heart failure) ([facility]     Dr. Rausch(cardiologist)    CKD (chronic kidney disease) stage 3, GFR 30-59 ml/min ([facility])     Fracture     R index finger and L ankle    Gout     Hnands/feet/elbows    Hypertension     Pacemaker      Past Surgical History:        Procedure Laterality Date    CARDIAC CATHETERIZATION       Medications Prior to Admission:   No current facility-administered medications on file prior to encounter. Current Outpatient Medications on File Prior to Encounter   Medication Sig Dispense Refill    ELIQUIS 5 MG TABS tablet TAKE ONE TABLET BY MOUTH TWICE A  tablet 3    carvedilol (COREG) 25 MG tablet TAKE ONE TABLET BY MOUTH TWICE A DAY WITH MEALS 180 tablet 3    amLODIPine (NORVASC) 5 MG tablet TAKE ONE TABLET BY MOUTH DAILY 90 tablet 3    isosorbide mononitrate (IMDUR) 30 MG extended release tablet TAKE ONE TABLET BY MOUTH DAILY 90 tablet 3    hydrALAZINE (APRESOLINE) 10 MG tablet Take 1 tablet by mouth 3 times daily 90 tablet 3    levothyroxine (SYNTHROID) 50 MCG tablet Take 50 mcg by mouth daily      sodium bicarbonate 650 MG tablet Take 1 tablet by mouth 2 times daily 60 tablet 0    allopurinol (ZYLOPRIM) 300 MG tablet Take 1 tablet by mouth daily (Patient taking differently: Take 100 mg by mouth daily ) 90 tablet 1     Allergies:  Peanut-containing drug products and Penicillins    History of allergic reaction to anesthesia:  No    Social History:   TOBACCO:   reports that he is a non-smoker but has been exposed to tobacco smoke. He has never used smokeless tobacco.  ETOH:   reports current alcohol use.   DRUGS: reports current drug use. Family History:       Problem Relation Age of Onset    Heart Disease Father         CHF- of    High Blood Pressure Father     Heart Disease Mother     High Blood Pressure Mother     High Blood Pressure Brother         Stent placement x2       PHYSICAL EXAM:      BP (!) 163/106   Pulse 59   Temp 96.9 °F (36.1 °C) (Temporal)   Resp 18   Ht 6' 1\" (1.854 m)   Wt 270 lb (122.5 kg)   SpO2 100%   BMI 35.62 kg/m²  I        Heart:  No m/r/g +s1/s2 RRR    Lungs:  CTA bilaterally    Abdomen:  Soft, nontender, non distended; +bs    ASA Grade:  ASA 3 - Patient with moderate systemic disease with functional limitations    Mallampati Class:  Class I: Soft palate, uvula, fauces, pillars visible  __________  Class II: Soft palate, uvula, fauces visible  ____x______   Class III: Soft palate, base of uvula visible  __________  Class IV: Hard palate only visible   __________      ASSESSMENT AND PLAN:    1. Patient is a 81 y.o. male here for colonoscopy with deep sedation  2. Procedure options, risks and benefits reviewed with patient. We specifically discussed that risks include, but are not limited to infection, bleeding, perforation, death, and missed lesions. Patient expresses understanding.

## 2022-05-31 NOTE — PROGRESS NOTES
Ambulatory Surgery/Procedure Discharge Note    Vitals:    05/31/22 1250   BP: (!) 157/120   Pulse: 60   Resp: 18   Temp:    SpO2: 100%   b/p meets lacey standards  Patient is due for b/p meds and states he'll take once he arrives home    In: 620 [P.O.:120; I.V.:500]  Out: -     Restroom use offered before discharge. Yes    Pain assessment:  level of pain (1-10, 10 severe)  Pain Level: 0        Patient discharged to home/self care. Patient discharged via wheel chair by transporter to waiting family.        5/31/2022 1:01 PM

## 2022-05-31 NOTE — ANESTHESIA PRE PROCEDURE
Department of Anesthesiology  Preprocedure Note       Name:  Tiffany Wallace   Age:  81 y.o.  :  1950                                          MRN:  [id number]         Date:  2022      Surgeon: Heri Martinez):  Jean Pierre Tovar MD    Procedure: Procedure(s):  COLONOSCOPY    Medications prior to admission:   Prior to Admission medications    Medication Sig Start Date End Date Taking?  Authorizing Provider   amiodarone (CORDARONE) 200 MG tablet Take one tablet by mouth daily 22   JOSE Little CNP   sildenafil (VIAGRA) 100 MG tablet TAKE ONE TABLET BY MOUTH DAILY AS NEEDED FOR ERECTILE DYSFUNCTION 22   Tamika Zamora MD   ELIQUIS 5 MG TABS tablet TAKE ONE TABLET BY MOUTH TWICE A DAY 22   JOSE Little CNP   carvedilol (COREG) 25 MG tablet TAKE ONE TABLET BY MOUTH TWICE A DAY WITH MEALS 10/26/21   JOSE Little CNP   amLODIPine (NORVASC) 5 MG tablet TAKE ONE TABLET BY MOUTH DAILY 21   JOSE Little CNP   isosorbide mononitrate (IMDUR) 30 MG extended release tablet TAKE ONE TABLET BY MOUTH DAILY 21   JOSE Little CNP   hydrALAZINE (APRESOLINE) 10 MG tablet Take 1 tablet by mouth 3 times daily 21   JOSE Little CNP   levothyroxine (SYNTHROID) 50 MCG tablet Take 50 mcg by mouth daily 20   Historical Provider, MD   sodium bicarbonate 650 MG tablet Take 1 tablet by mouth 2 times daily 19   Janine Kitchne MD   allopurinol (ZYLOPRIM) 300 MG tablet Take 1 tablet by mouth daily  Patient taking differently: Take 100 mg by mouth daily  10/15/18   Edvin Luke MD       Current medications:    Current Facility-Administered Medications   Medication Dose Route Frequency Provider Last Rate Last Admin    lidocaine PF 1 % injection 1 mL  1 mL IntraDERmal Once PRN Jean Del Angel MD        lactated ringers infusion   IntraVENous Continuous Jean Del Angel  mL/hr at 22 1142 New Bag at 22 1142    sodium chloride flush 0.9 % injection 5-40 mL  5-40 mL IntraVENous 2 times per day Stacey Devine MD        sodium chloride flush 0.9 % injection 5-40 mL  5-40 mL IntraVENous PRN Stacey Devine MD        0.9 % sodium chloride infusion   IntraVENous PRN Stacey Devine MD           Allergies: Allergies   Allergen Reactions    Peanut-Containing Drug Products Anaphylaxis    Penicillins        Problem List:    Patient Active Problem List   Diagnosis Code    Gout M10.9    [facility] I48.91    Hypertension I10    Presence of cardiac resynchronization therapy defibrillator (CRT-D) Z95.810    Cardiomyopathy ([facility] I42.9    CKD (chronic kidney disease) N18.9    Long term current use of anticoagulant Z79.01    Monoclonal gammopathy D47.2    Exertional chest pain R07.9    BELL (acute kidney injury) ([facility] N17.9    Hyperkalemia E87.5    H/O angiography Z92.89       Past Medical History:        Diagnosis Date    Atrial fibrillation ([facility])     CHF (congestive heart failure) ([facility])     Dr. Rausch(cardiologist)    CKD (chronic kidney disease) stage 3, GFR 30-59 ml/min ([facility])     Fracture     R index finger and L ankle    Gout 2000    Hnands/feet/elbows    Hypertension     Pacemaker        Past Surgical History:        Procedure Laterality Date    CARDIAC CATHETERIZATION  5-2004       Social History:    Social History     Tobacco Use    Smoking status: Passive Smoke Exposure - Never Smoker    Smokeless tobacco: Never Used   Substance Use Topics    Alcohol use:  Yes     Alcohol/week: 0.0 standard drinks     Comment: occasionally                                Counseling given: Not Answered      Vital Signs (Current):   Vitals:    05/31/22 1128   BP: (!) 163/106   Pulse: 59   Resp: 18   Temp: 96.9 °F (36.1 °C)   TempSrc: Temporal   SpO2: 100%   Weight: 270 lb (122.5 kg)   Height: 6' 1\" (1.854 m)                                              BP Readings from Last 3 Encounters:   05/31/22 (!) 163/106   12/30/21 104/78 06/30/21 (!) 124/94       NPO Status: Time of last liquid consumption: 2100                        Time of last solid consumption: 2100                        Date of last liquid consumption: 05/30/22                        Date of last solid food consumption: 05/29/22    BMI:   Wt Readings from Last 3 Encounters:   05/31/22 270 lb (122.5 kg)   12/30/21 288 lb (130.6 kg)   06/30/21 291 lb (132 kg)     Body mass index is 35.62 kg/m². CBC:   Lab Results   Component Value Date    WBC 6.8 03/12/2021    RBC 3.82 03/12/2021    HGB 12.0 03/12/2021    HCT 36.6 03/12/2021    MCV 95.7 03/12/2021    RDW 15.8 03/12/2021     03/12/2021       CMP:   Lab Results   Component Value Date     03/12/2021    K 4.7 03/12/2021     03/12/2021    CO2 22 03/12/2021    BUN 34 03/12/2021    CREATININE 3.3 03/12/2021    GFRAA 23 03/12/2021    AGRATIO 1.3 09/02/2020    LABGLOM 19 03/12/2021    GLUCOSE 112 03/12/2021    PROT 7.2 09/02/2020    CALCIUM 9.2 03/12/2021    BILITOT 0.6 09/02/2020    ALKPHOS 109 09/02/2020    AST 51 09/02/2020    ALT 36 09/02/2020       POC Tests: No results for input(s): POCGLU, POCNA, POCK, POCCL, POCBUN, POCHEMO, POCHCT in the last 72 hours.     Coags:   Lab Results   Component Value Date    PROTIME 22.5 12/22/2021    INR 1.94 12/22/2021    APTT >248.0 05/08/2019       HCG (If Applicable): No results found for: PREGTESTUR, PREGSERUM, HCG, HCGQUANT     ABGs: No results found for: PHART, PO2ART, SZP3PWR, XFY5GXE, BEART, K3YCQCAZ     Type & Screen (If Applicable):  No results found for: LABABO, LABRH    Drug/Infectious Status (If Applicable):  No results found for: HIV, HEPCAB    COVID-19 Screening (If Applicable):   Lab Results   Component Value Date    COVID19 Not Detected 08/13/2020           Anesthesia Evaluation    Airway: Mallampati: II  TM distance: >3 FB   Neck ROM: full  Mouth opening: > = 3 FB   Dental:          Pulmonary:                              Cardiovascular:    (+) hypertension:, pacemaker: pacemaker and AICD, CHF:,         Rhythm: regular  Rate: normal                    Neuro/Psych:               GI/Hepatic/Renal:             Endo/Other:                     Abdominal:             Vascular: Other Findings:           Anesthesia Plan      MAC     ASA 3       Induction: intravenous. Anesthetic plan and risks discussed with patient. Plan discussed with CRNA.                     Dami Agrawal MD   5/31/2022

## 2022-06-03 ENCOUNTER — OFFICE VISIT (OUTPATIENT)
Dept: CARDIOLOGY CLINIC | Age: 72
End: 2022-06-03
Payer: MEDICARE

## 2022-06-03 VITALS
DIASTOLIC BLOOD PRESSURE: 82 MMHG | HEART RATE: 64 BPM | WEIGHT: 279 LBS | HEIGHT: 73 IN | SYSTOLIC BLOOD PRESSURE: 140 MMHG | BODY MASS INDEX: 36.98 KG/M2

## 2022-06-03 DIAGNOSIS — I10 ESSENTIAL HYPERTENSION: ICD-10-CM

## 2022-06-03 DIAGNOSIS — I48.91 ATRIAL FIBRILLATION, UNSPECIFIED TYPE (HCC): Primary | ICD-10-CM

## 2022-06-03 DIAGNOSIS — Z95.810 PRESENCE OF CARDIAC RESYNCHRONIZATION THERAPY DEFIBRILLATOR (CRT-D): ICD-10-CM

## 2022-06-03 PROCEDURE — 99214 OFFICE O/P EST MOD 30 MIN: CPT | Performed by: INTERNAL MEDICINE

## 2022-06-03 PROCEDURE — 1123F ACP DISCUSS/DSCN MKR DOCD: CPT | Performed by: INTERNAL MEDICINE

## 2022-06-03 NOTE — PROGRESS NOTES
[address]  Office Visit           Yumiko Anguiano MD,  [facility]                     Cardiology           [facility]  1950    Mara 3, 2022         HPI:  The patient is 81 y.o. male with CM  palpitations/Pt has [employer] Bi- V ICD w corvue implanted by Dr. Maciej Shields. Placed in 2015 by Dr. Godwin Perera the patient is stable today without complaints of problems. Recently replaced by Dr. Maciej Shields on 7/7/2020. Doing well today and no issues. Pacemaker defibrillators are in position. No chest complaints of shortness of breath or dyspnea. Busy helping manage his two 11year-old grand children. No problems. Did have coronavirus infection 3 months ago. Subsequently had vaccine with [employer]. CM av paced /   hx PAF AV paced   CRD follows Dr. Kandy Orozco   Obesity /dicussed activity nutrition / has lost over 200#   NICM / no c/o PND or SULMA   SJM CRT-D: interrogated on 3/2019  / 10% afib / placed in 2015 by Dr. Godwin Perera   us renals 5/9/19   Medical renal disease. No hydronephrosis   Probable incidental splenic cavernous hemangioma     Echo 2016 EF was 50-55% Mild biatrial enlargement.   Sinus of valsalva appears to be mildly dilated.   Mild-moderate mitral regurgitation is present.   Mild pulmonic regurgitation present. [facility] 5/10/19 Left Main: Normal   Left Anterior Descending: Tortuosity with mild proximal calcification but no significant stenosis. There is a large septal  that has some ostial narrowing of 60-70%. Circumflex: Large vessel probably codominant. Mild plaquing but no significant stenoses. Right Coronary: The vessel is probably codominant. Mild plaque but no significant stenosis. Left Ventriculogram: Ejection fraction 30-35%. Anterior wall apex is akinetic suggesting regional wall motion abnormality but we do not find a coronary lesion that would cause this problem.   Assessment: Mild coronary artery disease but does not require intervention. Cardiomyopathy ischemic appearing with regional wall motion in the anterior wall and apex akinesia and reduced ejection fraction  Recommendations: Optimize medical care continue dual antiplatelet therapy. viewed most recent test with pt. with hx palpitations/ denies having any recent  palpitations      Review of Systems:  Constitutional: Denies  fatigue, weakness, night sweats or fever. HEENT: Denies new visual changes, ringing in ears, nosebleeds,nasal congestion  Respiratory: Denies new or change in SOB, PND, orthopnea or cough. Cardiovascular: see HPI  GI: Denies N/V, diarrhea, constipation, abdominal pain, change in bowel habits, melena or hematochezia  : Denies urinary frequency, urgency, incontinence, hematuria or dysuria. Skin: Denies rash, hives, or cyanosis  Musculoskeletal: Denies joint or muscle aches/pain  Neurological: Denies syncope or TIA-like symptoms. Psychiatric: Denies anxiety, insomnia or depression     Past Medical History:   Diagnosis Date    [facility]     CH[facility]congestive [facility] failure) [facility])     Dr. Rausch(cardiologist)    CKD (chronic kidney disease) stage 3, GFR 30-59 ml/min ([facility])     Fracture     R index finger and L ankle    Gout     Hnands/feet/elbows    Hypertension     Pacemaker      Past Surgical History:   Procedure Laterality Date    CARDIAC CATHETERIZATION      COLONOSCOPY  2022    COLONOSCOPY POLYPECTOMY SNARE/COLD BIOPSY performed by Jean Pierre Tovar MD at [address] ENDOSCOPY     Family History   Problem Relation Age of Onset    Heart Disease Father         CHF- of    High Blood Pressure Father     Heart Disease Mother     High Blood Pressure Mother     High Blood Pressure Brother         Stent placement x2     Social History     Tobacco Use    Smoking status: Passive Smoke Exposure - Never Smoker    Smokeless tobacco: Never Used   Substance Use Topics    Alcohol use:  Yes     Alcohol/week: 0.0 standard drinks     Comment: occasionally    Drug use: Yes     Comment: Quit using in 2007       Allergies   Allergen Reactions    Peanut-Containing Drug Products Anaphylaxis    Penicillins      Current Outpatient Medications   Medication Sig Dispense Refill    amiodarone (CORDARONE) 200 MG tablet Take one tablet by mouth daily 90 tablet 3    sildenafil (VIAGRA) 100 MG tablet TAKE ONE TABLET BY MOUTH DAILY AS NEEDED FOR ERECTILE DYSFUNCTION 10 tablet 3    ELIQUIS 5 MG TABS tablet TAKE ONE TABLET BY MOUTH TWICE A  tablet 3    carvedilol (COREG) 25 MG tablet TAKE ONE TABLET BY MOUTH TWICE A DAY WITH MEALS 180 tablet 3    amLODIPine (NORVASC) 5 MG tablet TAKE ONE TABLET BY MOUTH DAILY 90 tablet 3    isosorbide mononitrate (IMDUR) 30 MG extended release tablet TAKE ONE TABLET BY MOUTH DAILY 90 tablet 3    hydrALAZINE (APRESOLINE) 10 MG tablet Take 1 tablet by mouth 3 times daily 90 tablet 3    levothyroxine (SYNTHROID) 50 MCG tablet Take 50 mcg by mouth daily      sodium bicarbonate 650 MG tablet Take 1 tablet by mouth 2 times daily 60 tablet 0    allopurinol (ZYLOPRIM) 300 MG tablet Take 1 tablet by mouth daily (Patient taking differently: Take 100 mg by mouth daily ) 90 tablet 1     No current facility-administered medications for this visit. Physical Exam:   BP (!) 140/82   Pulse 64   Ht 6' 1\" (1.854 m)   Wt 279 lb (126.6 kg)   BMI 36.81 kg/m²   BP Readings from Last 3 Encounters:   06/03/22 (!) 140/82   05/31/22 (!) 157/120   12/30/21 104/78     Pulse Readings from Last 3 Encounters:   06/03/22 64   05/31/22 60   12/30/21 80     Wt Readings from Last 3 Encounters:   06/03/22 279 lb (126.6 kg)   05/31/22 270 lb (122.5 kg)   12/30/21 288 lb (130.6 kg)     Constitutional: He is oriented to person, place, and time. He appears well-developed and well-nourished. In no acute distress. HEENT: Normocephalic and atraumatic. Sclerae anicteric. No xanthelasmas.  Conjunctiva white, no subconjunctival hemorrhage   External inspection of ears nose teeth & gums   Eyes:PERRLA EOM's intact. Neck: Neck supple. No JVD present. Carotids without bruits. No mass and no thyromegaly present. No lymphadenopathy present. Cardiovascular: RRR, normal S1 and S2; no murmur/gallop or rub, PMI nondisplaced  Pulmonary/Chest: Effort normal.  Lungs clear to auscultation. Chest wall nontender  Abdominal: soft, nontender, nondistended. + bowel sounds; no organomegaly or bruits. Aorta normal  Extremities: No edema, cyanosis, or clubbing. Pulses are 2+ radial/carotid/dorsalis pedis bilaterally. Cap refill brisk. Neurological: No cranial nerve deficit. Psychiatric: He has a normal mood and affect. His speech is normal and behavior is normal.        Lab Results   Component Value Date    TRIG 70 05/09/2019    HDL 55 05/09/2019    LDLCALC 81 05/09/2019    LABVLDL 14 05/09/2019     Lab Results   Component Value Date     03/12/2021    K 4.7 03/12/2021     03/12/2021    CO2 22 03/12/2021    BUN 34 03/12/2021    CREATININE 3.3 03/12/2021    GLUCOSE 112 03/12/2021    CALCIUM 9.2 03/12/2021      Lab Results   Component Value Date    WBC 6.8 03/12/2021    HGB 12.0 (L) 03/12/2021    HCT 36.6 (L) 03/12/2021    MCV 95.7 03/12/2021     03/12/2021     EKG on 10/3/2019 AV paced at 60/min. Stable pattern. Assessment:    Was in hosp  / abd stress test and [facility] 5/10/19 Left Main: Normal   Left Anterior Descending: Tortuosity with mild proximal calcification but no significant stenosis. There is a large septal  that has some ostial narrowing of 60-70%. Circumflex: Large vessel probably codominant. Mild plaquing but no significant stenoses. Right Coronary: The vessel is probably codominant. Mild plaque but no significant stenosis. Left Ventriculogram: Ejection fraction 30-35%.   Anterior wall apex is akinetic suggesting regional wall motion abnormality but we do not find a coronary lesion that would cause this

## 2022-06-30 ENCOUNTER — NURSE ONLY (OUTPATIENT)
Dept: CARDIOLOGY CLINIC | Age: 72
End: 2022-06-30
Payer: MEDICARE

## 2022-06-30 DIAGNOSIS — Z95.810 PRESENCE OF CARDIAC RESYNCHRONIZATION THERAPY DEFIBRILLATOR (CRT-D): ICD-10-CM

## 2022-06-30 DIAGNOSIS — I42.9 CARDIOMYOPATHY, UNSPECIFIED TYPE (HCC): ICD-10-CM

## 2022-06-30 PROCEDURE — 93295 DEV INTERROG REMOTE 1/2/MLT: CPT | Performed by: INTERNAL MEDICINE

## 2022-06-30 PROCEDURE — 93296 REM INTERROG EVL PM/IDS: CPT | Performed by: INTERNAL MEDICINE

## 2022-07-01 NOTE — PROGRESS NOTES
We received remote transmission from patient's CRT-D monitor at home. Transmission shows normal sensing and pacing function. AT/AFL noted, 9.1% burden, longest 3 days (Eliquis, Coreg, amiodarone). Ap 78%  BiVp >99%    Corvue is WNL. EP physician will review. See interrogation under cardiology tab in the [address] [place] [address] [address] field for more details. Will continue to monitor remotely.      (End of 91-day monitoring period 6/30/22)

## 2022-08-05 RX ORDER — SILDENAFIL 100 MG/1
TABLET, FILM COATED ORAL
Qty: 10 TABLET | Refills: 3 | OUTPATIENT
Start: 2022-08-05

## 2022-08-11 ENCOUNTER — TELEPHONE (OUTPATIENT)
Dept: CARDIOLOGY CLINIC | Age: 72
End: 2022-08-11

## 2022-08-12 RX ORDER — SILDENAFIL 100 MG/1
TABLET, FILM COATED ORAL
Qty: 10 TABLET | Refills: 3 | Status: CANCELLED | OUTPATIENT
Start: 2022-08-12

## 2022-08-15 NOTE — TELEPHONE ENCOUNTER
-- Done

## 2022-08-25 ENCOUNTER — TELEPHONE (OUTPATIENT)
Dept: CARDIOLOGY CLINIC | Age: 72
End: 2022-08-25

## 2022-08-25 NOTE — TELEPHONE ENCOUNTER
Pt called for refill. He states pharmacy won't refill because he takes Imdur.  He said he does not usually take these two medications together.    sildenafil (VIAGRA) 100 MG tablet [[id number]]     Order Details  Dose, Route, Frequency: As Directed   Dispense Quantity: 10 tablet Refills: 3          Sig: TAKE ONE TABLET BY MOUTH DAILY AS NEEDED FOR ERECTILE DYSFUNCTION         Start Date: 05/12/22 End Date: --   Written Date: 05/12/22 Expiration Date: 05/12/23   Original Order:  sildenafil (VIAGRA) 100 MG tablet [[id number]]     Last visit: 6/3/22  Next visit: 6/5/23  Last labs: 3/12/21  Last filled: 5/12/22

## 2022-08-27 NOTE — TELEPHONE ENCOUNTER
Imdur is long acting nitrate and I'm not comfortable with ordering Viagra with Imdur  JM     I called him twice no answer and no message machine  JM

## 2022-09-09 DIAGNOSIS — I42.9 CARDIOMYOPATHY, UNSPECIFIED TYPE (HCC): ICD-10-CM

## 2022-09-09 DIAGNOSIS — I48.91 ATRIAL FIBRILLATION, UNSPECIFIED TYPE (HCC): ICD-10-CM

## 2022-09-09 DIAGNOSIS — I10 ESSENTIAL HYPERTENSION: ICD-10-CM

## 2022-09-09 RX ORDER — ISOSORBIDE MONONITRATE 30 MG/1
TABLET, EXTENDED RELEASE ORAL
Qty: 90 TABLET | Refills: 2 | Status: SHIPPED | OUTPATIENT
Start: 2022-09-09

## 2022-10-03 DIAGNOSIS — I10 ESSENTIAL HYPERTENSION: ICD-10-CM

## 2022-10-03 DIAGNOSIS — I42.9 CARDIOMYOPATHY, UNSPECIFIED TYPE (HCC): ICD-10-CM

## 2022-10-03 DIAGNOSIS — I48.91 ATRIAL FIBRILLATION, UNSPECIFIED TYPE (HCC): ICD-10-CM

## 2022-10-03 RX ORDER — CARVEDILOL 25 MG/1
TABLET ORAL
Qty: 180 TABLET | Refills: 3 | Status: SHIPPED | OUTPATIENT
Start: 2022-10-03

## 2022-10-03 NOTE — TELEPHONE ENCOUNTER
Requested Prescriptions     Pending Prescriptions Disp Refills    carvedilol (COREG) 25 MG tablet [Pharmacy Med Name: CARVEDILOL 25 MG TABLET] 180 tablet 3     Sig: TAKE ONE TABLET BY MOUTH TWICE A DAY WITH MEALS              Last Office Visit: 10/23/2020     Next Office Visit: [phone]

## 2022-10-06 ENCOUNTER — NURSE ONLY (OUTPATIENT)
Dept: CARDIOLOGY CLINIC | Age: 72
End: 2022-10-06
Payer: MEDICARE

## 2022-10-06 DIAGNOSIS — Z95.810 PRESENCE OF CARDIAC RESYNCHRONIZATION THERAPY DEFIBRILLATOR (CRT-D): Primary | ICD-10-CM

## 2022-10-06 DIAGNOSIS — I42.9 CARDIOMYOPATHY, UNSPECIFIED TYPE (HCC): ICD-10-CM

## 2022-10-06 PROCEDURE — 93296 REM INTERROG EVL PM/IDS: CPT | Performed by: INTERNAL MEDICINE

## 2022-10-06 PROCEDURE — 93295 DEV INTERROG REMOTE 1/2/MLT: CPT | Performed by: INTERNAL MEDICINE

## 2022-10-07 NOTE — PROGRESS NOTES
We received remote transmission from patient's CRT-D monitor at home. Transmission shows normal sensing and pacing function. AT/AFL noted, 6.5% burden (Eliquis, Coreg, amiodarone). Ap 88%  BiVp >99%    Corvue is WNL. EP physician will review. See interrogation under cardiology tab in the [address] [place] [address] [address] field for more details. Will continue to monitor remotely.      (End of 91-day monitoring period 10/6/22)

## 2023-01-19 ENCOUNTER — NURSE ONLY (OUTPATIENT)
Dept: CARDIOLOGY CLINIC | Age: 73
End: 2023-01-19
Payer: MEDICARE

## 2023-01-19 DIAGNOSIS — Z95.810 PRESENCE OF CARDIAC RESYNCHRONIZATION THERAPY DEFIBRILLATOR (CRT-D): Primary | ICD-10-CM

## 2023-01-19 DIAGNOSIS — I48.0 PAROXYSMAL ATRIAL FIBRILLATION (HCC): ICD-10-CM

## 2023-01-19 DIAGNOSIS — I42.9 CARDIOMYOPATHY, UNSPECIFIED TYPE (HCC): ICD-10-CM

## 2023-01-19 PROCEDURE — 93295 DEV INTERROG REMOTE 1/2/MLT: CPT | Performed by: INTERNAL MEDICINE

## 2023-01-19 PROCEDURE — 93296 REM INTERROG EVL PM/IDS: CPT | Performed by: INTERNAL MEDICINE

## 2023-01-19 NOTE — Clinical Note
Last device OV 10.23.2020. Per [facility] on last remote: Appears to be intermittent BP capture during electrograms noted on Bipolar LV signal; check out next office visit. No upcoming device appts scheduled.

## 2023-01-23 NOTE — PROGRESS NOTES
Remote transmission received from patient's CRT-D monitor at home. Transmission shows normal sensing and pacing function; possible intermittent LV capture noted on stored EGMs, previously noted on last remote. Last device OV 10.2020-office staff notified. Noted AT/AF/L, 2.1% burden, longest 8hrs (Eliquis, Coreg, amiodarone). Ap 97%  BiVp >99%    Corvue is within normal limits. End of 91-day monitoring period 1/19/23. EP physician will review. See interrogation under cardiology tab in the [address] [place] [address] [address] field for more details. Will continue to monitor remotely.

## 2023-01-24 ENCOUNTER — TELEPHONE (OUTPATIENT)
Dept: CARDIOLOGY CLINIC | Age: 73
End: 2023-01-24

## 2023-01-24 NOTE — TELEPHONE ENCOUNTER
----- Message from Ele Galindo sent at 1/23/2023  1:55 PM EST -----  Last device OV 10.23.2020. Per [facility] on last remote: Appears to be intermittent BP capture during electrograms noted on Bipolar LV signal; check out next office visit. No upcoming device appts scheduled. Please call patient to arrange Device and EP OV.   Thanks

## 2023-02-20 ENCOUNTER — TELEPHONE (OUTPATIENT)
Dept: CARDIOLOGY CLINIC | Age: 73
End: 2023-02-20

## 2023-02-20 NOTE — TELEPHONE ENCOUNTER
Per [facility], \"Intermittent loss of capture of BiV and Afib, inspite of being on Amiodarone. Pls make a f/u with EP in few weeks\"    Pt is already scheduled to see CV on 4/19. Called and LVM asking pt to call back to set up sooner OV (with device check). When he calls back, please offer 4:30 on either 3/8 or 3/22.

## 2023-02-20 NOTE — TELEPHONE ENCOUNTER
Patient called back and said that he cannot do 4:30, he needs a morning appt or he is going to have to keep his original appt. [phone]

## 2023-02-20 NOTE — TELEPHONE ENCOUNTER
Pt has not seen an EP MD yet, so we can offer a new pt OV with [facility] instead. Please call and offer [phone] on 3/2, 3/7, or 3/9 (with device check prior).

## 2023-03-08 NOTE — PROGRESS NOTES
[address]   Cardiac Electrophysiology Consultation   Date: 3/16/2023  Reason for Consultation:   Consult Requesting Physician: No att. providers found     Chief Complaint:   Chief Complaint   Patient presents with    Atrial Fibrillation      HPI: Vivienne Gordillo is a 72 y.o. male, pt of Dr. Hilario Naylor, with PMH significant for HTN, HLD, CKD, PAF on amiodarone, NICMP, EF 30%, s/p SJM CRT-D with surgical implant of epicardial LV lead (12/2010), EF improved to 50-55% (2016), s/p gen change (7/2020, Dr. Lee Salazar), s/p LHC (5/2019, Dr. Hilario Naylor) showed mild CAD with EF 30-35%, echo (8/2020) showed EF 40-45%. Interval History: Today, he was asked to come in tor the office due to intermittent loss of capture of BiV along with AF seen on remote device. He feels well. Denies complaints of palpitations, dizziness, CP, SOB, orthopnea, or PND. Denies adding salt to this diet. He is active, denies decline in functional capacity, enjoys playing with his grandchildren. FFOS during LV threshold testing. 1.7 years estimated until ALMA/RRT. Underlying AsVs @ 38 bpm   AP >99%  BiVP >99%   PVC <1%  2 AMS episodes noted with available EGMs illustrating OS of myopotentials. Able to reproduce noise on atrial lead with isometrics. Similarly, there is no loss of capture of the BiV; we noted predominantly over sensing of R waves from RV lead; Add'l [place] alerts turned ON. NID in VT/VF changed to 30. Corvue is below reference indicating fluid accumulation -clinically, no symptoms and signs of volume overload.     Assessment:  ICMP / HFrEF, EF 40-45%, on Coreg, hydralazine, Imdur, no ACE/ARB/Arni/SGLT2 due to CKD  S/p CRT-D  PAF, low burden, on amiodarone, Coreg, Eliquis  HTN, stable on current meds  CAD, mild, follows Dr. Hilario Naylor  CKD    Plan:  No changes to current medications or with device settings  Obtain echo to assess LVEF (last in 2020)  Obtain PFT, TSH/T4, LFTs for amiodarone screening  Continue with remote home transmission every 3 months  Follow up with EP NP in 6 months  Continue to f/u with Dr. Isa Delgado as already scheduled    Past Medical History:   Diagnosis Date    Atrial fibrillation [facility])     CHF (congestive heart failure) ([facility])     Dr. Rausch(cardiologist)    CKD (chronic kidney disease) stage 3, GFR 30-59 ml/min ([facility])     Fracture     R index finger and L ankle    Gout 2000    Hnands/feet/elbows    Hypertension     Pacemaker         Past Surgical History:   Procedure Laterality Date    CARDIAC CATHETERIZATION  5-2004    COLONOSCOPY  5/31/2022    COLONOSCOPY POLYPECTOMY SNARE/COLD BIOPSY performed by Miki Murillo MD at [address]: Allergies   Allergen Reactions    Peanut-Containing Drug Products Anaphylaxis    Penicillins        Medication:   Prior to Admission medications    Medication Sig Start Date End Date Taking? Authorizing Provider   carvedilol (COREG) 25 MG tablet TAKE ONE TABLET BY MOUTH TWICE A DAY WITH MEALS 10/3/22  Yes JOSE Bird CNP   isosorbide mononitrate (IMDUR) 30 MG extended release tablet TAKE ONE TABLET BY MOUTH DAILY 9/9/22  Yes JOSE Bird CNP   amiodarone (CORDARONE) 200 MG tablet Take one tablet by mouth daily 5/25/22  Yes JOSE Bird CNP   sildenafil (VIAGRA) 100 MG tablet TAKE ONE TABLET BY MOUTH DAILY AS NEEDED FOR ERECTILE DYSFUNCTION 5/12/22  Yes Gilbert Arias MD   ELIQUIS 5 MG TABS tablet TAKE ONE TABLET BY MOUTH TWICE A DAY 2/7/22  Yes JOSE Bird CNP   amLODIPine (NORVASC) 5 MG tablet TAKE ONE TABLET BY MOUTH DAILY 8/9/21  Yes JOSE Bird CNP   levothyroxine (SYNTHROID) 50 MCG tablet Take 50 mcg by mouth daily 6/8/20  Yes Historical Provider, MD   allopurinol (ZYLOPRIM) 300 MG tablet Take 1 tablet by mouth daily  Patient taking differently: Take 100 mg by mouth daily 10/15/18  Yes Jase Ivy MD       Social History:   reports that he has never smoked.  He has been exposed to tobacco smoke. He has never used smokeless tobacco. He reports current alcohol use. He reports current drug use. Family History:  family history includes Heart Disease in his father and mother; High Blood Pressure in his brother, father, and mother. Reviewed. Denies family history of sudden cardiac death, arrhythmia, premature CAD    Review of System:    General ROS: negative for - chills, fever   Psychological ROS: negative for - anxiety or depression  Ophthalmic ROS: negative for - eye pain or loss of vision  ENT ROS: negative for - epistaxis, headaches, nasal discharge, sore throat   Allergy and Immunology ROS: negative for - hives, nasal congestion   Hematological and Lymphatic ROS: negative for - bleeding problems, blood clots, bruising or jaundice  Endocrine ROS: negative for - skin changes, temperature intolerance or unexpected weight changes  Respiratory ROS: negative for - cough, hemoptysis, pleuritic pain, SOB, sputum changes or wheezing  Cardiovascular ROS: Per HPI. Gastrointestinal ROS: negative for - abdominal pain, blood in stools, diarrhea, hematemesis, melena, nausea/vomiting or swallowing difficulty/pain  Genito-Urinary ROS: negative for - dysuria or incontinence  Musculoskeletal ROS: negative for - joint swelling or muscle pain  Neurological ROS: negative for - confusion, dizziness, gait disturbance, headaches, numbness/tingling, seizures, speech problems, tremors, visual changes or weakness  Dermatological ROS: negative for - rash    Physical Examination:  Vitals:    03/16/23 0940   BP: (!) 160/100   Pulse: 64       Constitutional: Oriented. No distress. Head: Normocephalic and atraumatic. Mouth/Throat: Oropharynx is clear and moist.   Eyes: Conjunctivae normal. EOM are normal.   Neck: Normal range of motion. Neck supple. No rigidity. No JVD present. Cardiovascular: Normal rate, regular rhythm, S1&S2 and intact distal pulses. Pulmonary/Chest: Bilateral respiratory sounds. No wheezes. No rhonchi. Abdominal: Soft. Bowel sounds present. No distension, No tenderness. Musculoskeletal: No tenderness. No edema    Lymphadenopathy: Has no cervical adenopathy. Neurological: Alert and oriented. Cranial nerve appears intact, No Gross deficit   Skin: Skin is warm and dry. No rash noted. Psychiatric: Has a normal mood, affect and behavior     Labs:  Reviewed. ECG: reviewed, BiVP 60,  ms    Studies:   1. Echo 8/19/20:   Summary   Normal left ventricular size. Severe left ventricular hypertrophy. Mildly decreased left ventricular systolic function with an estimated   ejection fraction of 40-45%. Mild global hypokinesis. Indeterminate diastolic function. Abnormal Global strain: -7.9%   The left atrium is dilated. The right atrium is dilated. The aortic root is dilated measuring 4.4 cm. The ascending aorta is dilated measuring 4.6 cm. Mitral annular calcification is present. Trivial mitral regurgitation. The aortic valve appears sclerotic but opens well. Pacer / ICD wire is visualized in the right ventricle. Mild tricuspid regurgitation. Trivial pulmonic regurgitation present. Estimated pulmonary artery systolic pressure is at mmHg assuming a right   atrial pressure of 3 mmHg    2. Stress Test 5/9/19:     Summary    There is anterior and lateral reversible ischemia    The LVEF is 56% with normal LV wall motion. This is an intermediate risk scan. 3. Cath 5/8/19 (Dr. Hao Kitchen): Angiographic Findings:  Left Main: Normal   Left Anterior Descending: Tortuosity with mild proximal calcification but no significant stenosis. There is a large septal  that has some ostial narrowing of 60-70%. Circumflex: Large vessel probably codominant. Mild plaquing but no significant stenoses. Right Coronary: The vessel is probably codominant. Mild plaque but no significant stenosis. Left Ventriculogram: Ejection fraction 30-35%.   Anterior wall apex is akinetic suggesting regional wall motion abnormality but we do not find a coronary lesion that would cause this problem. Right ileofemoral: Normal.   Hemodynamics:   Left Ventricular Pressure: 8  Central Aortic Pressure: 156   Assessment: Mild coronary artery disease but does not require intervention. Cardiomyopathy ischemic appearing with regional wall motion in the anterior wall and apex akinesia and reduced ejection fraction    The MCOT, echocardiogram, stress test, and coronary angiography/PCI were reviewed by myself and used for my plan of care. The CIED was interrogated and programmed and I supervised and reviewed all the data. All findings and changes are in device interrogation sheat and reflect my personal interpretation and changes and is scanned to Epic. - The patient is counseled to follow a low salt diet to assure blood pressure remains controlled for cardiovascular risk factor modification.   - The patient is counseled to avoid excess caffeine, and energy drinks as this may exacerbated ectopy and arrhythmia. - The patient is counseled to get regular exercise 3-5 times per week to control cardiovascular risk factors. - The patient is counseled to lose weigt to control cardiovascular risk factors. -The patient is counseled about the health hazards of smoking including cardiovascular side effects and its impact on morbidity and mortality. Thank you for allowing me to participate in the care of Kristy Sarabia. All questions and concerns were addressed to the patient/family. Alternatives to my treatment were discussed. Desiree Bryant RN, am scribing for and in the presence of Dr. Pavan Medeiros. 03/16/23 9:48 AM  Mayela Almodovar RN    I, Briana Camacho MD, personally performed the services prescribed in this documentation as scribed by Ms. Mayela Almodovar RN in my presence and it is both accurate and complete.      Briana Camacho MD  Cardiac Electrophysiology  [address]

## 2023-03-16 ENCOUNTER — NURSE ONLY (OUTPATIENT)
Dept: CARDIOLOGY CLINIC | Age: 73
End: 2023-03-16
Payer: MEDICARE

## 2023-03-16 ENCOUNTER — OFFICE VISIT (OUTPATIENT)
Dept: CARDIOLOGY CLINIC | Age: 73
End: 2023-03-16
Payer: MEDICARE

## 2023-03-16 VITALS
WEIGHT: 265.2 LBS | DIASTOLIC BLOOD PRESSURE: 100 MMHG | HEIGHT: 74 IN | BODY MASS INDEX: 34.03 KG/M2 | SYSTOLIC BLOOD PRESSURE: 160 MMHG | HEART RATE: 64 BPM

## 2023-03-16 DIAGNOSIS — I48.0 PAROXYSMAL ATRIAL FIBRILLATION (HCC): ICD-10-CM

## 2023-03-16 DIAGNOSIS — I10 ESSENTIAL HYPERTENSION: ICD-10-CM

## 2023-03-16 DIAGNOSIS — Z95.810 PRESENCE OF CARDIAC RESYNCHRONIZATION THERAPY DEFIBRILLATOR (CRT-D): Primary | ICD-10-CM

## 2023-03-16 DIAGNOSIS — I42.9 CARDIOMYOPATHY, UNSPECIFIED TYPE (HCC): ICD-10-CM

## 2023-03-16 DIAGNOSIS — Z79.899 ON AMIODARONE THERAPY: ICD-10-CM

## 2023-03-16 DIAGNOSIS — I42.0 DILATED CARDIOMYOPATHY (HCC): ICD-10-CM

## 2023-03-16 PROCEDURE — 3077F SYST BP >= 140 MM HG: CPT | Performed by: INTERNAL MEDICINE

## 2023-03-16 PROCEDURE — 3080F DIAST BP >= 90 MM HG: CPT | Performed by: INTERNAL MEDICINE

## 2023-03-16 PROCEDURE — 93284 PRGRMG EVAL IMPLANTABLE DFB: CPT | Performed by: INTERNAL MEDICINE

## 2023-03-16 PROCEDURE — 99204 OFFICE O/P NEW MOD 45 MIN: CPT | Performed by: INTERNAL MEDICINE

## 2023-03-16 PROCEDURE — 1123F ACP DISCUSS/DSCN MKR DOCD: CPT | Performed by: INTERNAL MEDICINE

## 2023-03-16 NOTE — RESULT ENCOUNTER NOTE
Unremarkable device check.    Continue to monitor    Dulce Galindo MD   Cardiac Electrophysiology  [facility]  Office [phone]

## 2023-03-16 NOTE — PROGRESS NOTES
Patient comes in for programming evaluation on his [facility] CRTD defibrillator. Gen change on 7/7/20 by CASPER    Last remote 01.19.2023    All sensing and pacing parameters appear unchanged since last in office check on 10.23.2020. FFOS during LV threshold testing. 1.7 years estimated until ALMA/RRT. Underlying AsVs @ 38 bpm   AP >99%  BiVP >99%   PVC <1%  2 AMS episodes noted with available EGMs illustrating possible artifact/OS of myopotentials. Able to reproduce noise on atrial lead with isometrics. Pt currently on amiodarone, eliquis, carvedilol. Add'l [place] alerts turned ON. NID in VT/VF changed to 30. Please see interrogation for more detail. Corvue is below reference indicating fluid accumulation. Pt does not appear to be on a diuretic. Patient will see Dr. Candida Nevarez today. We will continue to monitor remotely.

## 2023-03-17 ENCOUNTER — NURSE ONLY (OUTPATIENT)
Dept: CARDIOLOGY CLINIC | Age: 73
End: 2023-03-17

## 2023-03-17 NOTE — PROGRESS NOTES
[facility] remote transmission received from patient's CRT-D monitor at home d/t Exceeded AT/AF [place] w Patient notifier triggered 03.16.23 @ 11:47pm and Long AT/AF Episode(s). Transmission shows normal RA/RV sensing and pacing function; possible intermittent LV capture noted on stored EGMs, previously noted and difficult to discern. Clinic OV 06.16.2020 LV threshold testing loss of capture at 2.5V compares w recent OV 03.16.2023 LV EGM threshold morphology. Noted AT/AF/L, 6.7% burden w 23% Mode Switch burden (Eliquis, Coreg, amiodarone). Ap 76%  BiVp >99%  PVC <1%    Corvue shows decreasing trend below reference line, ongoing for 8 days. EP physician will review. See interrogation under cardiology tab in the [address] [place] [address] [address] field for more details. Will continue to monitor remotely.

## 2023-04-03 ENCOUNTER — TELEPHONE (OUTPATIENT)
Dept: CARDIOLOGY CLINIC | Age: 73
End: 2023-04-03

## 2023-04-03 NOTE — TELEPHONE ENCOUNTER
Patient called and stated that he wanted to speak to someone pertaining to his pacemaker going off. He said the battery is low when it beeps 2x, which is what it does when it's low. He was informed that he had a 1 1/2 left on his battery life. He wants someone to check and make sure there's nothing else going on with pacemaker. Call back [phone] or [phone].

## 2023-04-03 NOTE — PROGRESS NOTES
Patient comes in for programming evaluation on his [facility] CRTD defibrillator. Last remote 3.17.2023 [facility] ALERT remote transmission received from patient's CRT-D monitor at home d/t Exceeded AT/AF [place] w Patient notifier triggered 03.16.23 @ 11:47pm and Long AT/AF Episode(s). All sensing and pacing parameters appear unchanged since last in office check on 03.16.2023. 1.5 years estimated until ALMA/RRT. Underlying AsVs, sinus aamir/first degree @ 35-45 bpm  AP 90%  BiVP >99%   AT/AF episodes noted with a burden of 9%. Most episodes appear to be nocturnal. Longest x >/= 13 hrs. V rates appear controlled. Pt denies any s/s. Instructed to call office if becomes symptomatic. Pt currently on amiodarone, eliquis, carvedilol. LV Output adjusted to 3.75V. Adjusted AT/AF [place]lin alerts. Please see interrogation for more detail. Corvue is below reference indicating fluid accumulation. Pt does not appear to be on a diuretic. EP Physician will review. We will continue to monitor remotely.

## 2023-04-03 NOTE — TELEPHONE ENCOUNTER
Please either see if patient can send a manual download and/or come in to the office for device evaluation. It is possible the patient notifier is triggering d/t AT/AF episode(s). Recent remote on 03.17.2023 revealed estimated 1.5-1.8 years still remaining on his battery.      If he needs assistance, have him contact [facility], [place]          [phone]              Thanks

## 2023-04-04 ENCOUNTER — NURSE ONLY (OUTPATIENT)
Dept: CARDIOLOGY CLINIC | Age: 73
End: 2023-04-04
Payer: MEDICARE

## 2023-04-04 DIAGNOSIS — I42.9 CARDIOMYOPATHY, UNSPECIFIED TYPE (HCC): ICD-10-CM

## 2023-04-04 DIAGNOSIS — I48.0 PAROXYSMAL ATRIAL FIBRILLATION (HCC): ICD-10-CM

## 2023-04-04 DIAGNOSIS — Z95.810 PRESENCE OF CARDIAC RESYNCHRONIZATION THERAPY DEFIBRILLATOR (CRT-D): Primary | ICD-10-CM

## 2023-04-04 PROCEDURE — 93284 PRGRMG EVAL IMPLANTABLE DFB: CPT | Performed by: INTERNAL MEDICINE

## 2023-05-04 ENCOUNTER — NURSE ONLY (OUTPATIENT)
Dept: CARDIOLOGY CLINIC | Age: 73
End: 2023-05-04

## 2023-05-04 DIAGNOSIS — Z95.810 PRESENCE OF CARDIAC RESYNCHRONIZATION THERAPY DEFIBRILLATOR (CRT-D): Primary | ICD-10-CM

## 2023-05-04 DIAGNOSIS — I42.0 DILATED CARDIOMYOPATHY (HCC): ICD-10-CM

## 2023-05-04 DIAGNOSIS — I48.0 PAROXYSMAL ATRIAL FIBRILLATION (HCC): ICD-10-CM

## 2023-05-10 NOTE — PROGRESS NOTES
Remote transmission received from patient's CRT-D monitor at home. Transmission shows normal RA/RV sensing and pacing function; possible intermittent LV capture noted on stored EGMs, previously noted and difficult to discern. Noted AT/AF/L, 6.0% burden (Eliquis, Coreg, amiodarone). Ap 93%  BiVp >99%  PVC <1%    Corvue is within normal limits. End of 91-day monitoring period 5/4/23. EP physician will review. See interrogation under cardiology tab in the [address] [place] [address] [address] field for more details. Will continue to monitor remotely.

## 2023-05-15 ENCOUNTER — OFFICE VISIT (OUTPATIENT)
Dept: BARIATRICS/WEIGHT MGMT | Age: 73
End: 2023-05-15
Payer: MEDICARE

## 2023-05-15 VITALS
DIASTOLIC BLOOD PRESSURE: 89 MMHG | HEIGHT: 74 IN | SYSTOLIC BLOOD PRESSURE: 134 MMHG | BODY MASS INDEX: 32.85 KG/M2 | HEART RATE: 78 BPM | WEIGHT: 256 LBS

## 2023-05-15 DIAGNOSIS — K43.6 INCARCERATED VENTRAL HERNIA: ICD-10-CM

## 2023-05-15 DIAGNOSIS — N18.6 ESRD (END STAGE RENAL DISEASE) (HCC): Primary | ICD-10-CM

## 2023-05-15 PROCEDURE — 3079F DIAST BP 80-89 MM HG: CPT | Performed by: SURGERY

## 2023-05-15 PROCEDURE — 99205 OFFICE O/P NEW HI 60 MIN: CPT | Performed by: SURGERY

## 2023-05-15 PROCEDURE — 3075F SYST BP GE 130 - 139MM HG: CPT | Performed by: SURGERY

## 2023-05-15 PROCEDURE — 1123F ACP DISCUSS/DSCN MKR DOCD: CPT | Performed by: SURGERY

## 2023-05-15 NOTE — PROGRESS NOTES
[facility]) Physicians   General & Laparoscopic Surgery  [employer]       HPI:  Alexia Bentley is a very pleasant 72 y.o. male who is referred by Dr. Palumbo [facility] nephrologist for consultation with regards PD catheter placement    Patient has CKD stage 5 rapidly progressing to ESRD requiring dialysis and prefers PD over HD    Was referred for urgent dialysis catheter placement and hernia repair    On Eliquis for Afib        Past Medical History:   Diagnosis Date    Abdominal hernia     Atrial fibrillation ([facility] [address]     CHF (congestive heart failure) ([facility] [address]     Dr. Rausch(cardiologist)    CKD (chronic kidney disease) stage 3, GFR 30-59 ml/min ([facility])     Fracture     R index finger and L ankle    Gout     Hnands/feet/elbows    Hypertension     Pacemaker      Past Surgical History:   Procedure Laterality Date    CARDIAC CATHETERIZATION      COLONOSCOPY  2022    COLONOSCOPY POLYPECTOMY SNARE/COLD BIOPSY performed by Julius Goel MD at [address] History   Problem Relation Age of Onset    Heart Disease Father         CHF- of    High Blood Pressure Father     Heart Disease Mother     High Blood Pressure Mother     High Blood Pressure Brother         Stent placement x2     Social History     Tobacco Use    Smoking status: Never     Passive exposure: Yes    Smokeless tobacco: Never   Substance Use Topics    Alcohol use: Yes     Alcohol/week: 0.0 standard drinks     Comment: occasionally     I counseled the patient on the importance of not smoking and risks of ETOH. Allergies   Allergen Reactions    Peanut-Containing Drug Products Anaphylaxis    Penicillins      Vitals:    05/15/23 0841   BP: 134/89   Pulse: 78   Weight: 256 lb (116.1 kg)   Height: 6' 2\" (1.88 m)       Body mass index is 32.87 kg/m².       Current Outpatient Medications:     apixaban (ELIQUIS) 5 MG TABS tablet, Take 1 tablet by mouth 2 times daily, Disp: 180 tablet, Rfl: 0    carvedilol (COREG) 25 MG tablet,

## 2023-05-15 NOTE — PROGRESS NOTES
Place patient label inside box (if no patient label, complete below)  Name:  :  MR#:   Birdie Aguayo / PROCEDURE  I (we), Cornell Drake (Patient Name) authorize DR Ally Barbosa (Provider / Summer Bruno) and/or such assistants as may be selected by him/her, to perform the following operation/procedure(s): [address], ROBOTIC INCARCERATED VENTRAL HERNIA REPAIR       Note: If unable to obtain consent prior to an emergent procedure, document the emergent reason in the medical record. This procedure has been explained to my (our) satisfaction and included in the explanation was: The intended benefit, nature, and extent of the procedure to be performed; The significant risks involved and the probability of success; Alternative procedures and methods of treatment; The dangers and probable consequences of such alternatives (including no procedure or treatment); The expected consequences of the procedure on my future health; Whether other qualified individuals would be performing important surgical tasks and/or whether  would be present to advise or support the procedure. I (we) understand that there are other risks of infection and other serious complications in the pre-operative/procedural and postoperative/procedural stages of my (our) care. I (we) have asked all of the questions which I (we) thought were important in deciding whether or not to undergo treatment or diagnosis. These questions have been answered to my (our) satisfaction. I (we) understand that no assurance can be given that the procedure will be a success, and no guarantee or warranty of success has been given to me (us).     It has been explained to me (us) that during the course of the operation/procedure, unforeseen conditions may be revealed that necessitate extension of the original procedure(s) or different procedure(s) than those set

## 2023-05-16 ENCOUNTER — OFFICE VISIT (OUTPATIENT)
Dept: CARDIOLOGY CLINIC | Age: 73
End: 2023-05-16
Payer: MEDICARE

## 2023-05-16 VITALS
SYSTOLIC BLOOD PRESSURE: 120 MMHG | WEIGHT: 236 LBS | BODY MASS INDEX: 30.29 KG/M2 | HEIGHT: 74 IN | DIASTOLIC BLOOD PRESSURE: 88 MMHG | HEART RATE: 60 BPM

## 2023-05-16 DIAGNOSIS — Z01.818 PRE-OP EXAM: Primary | ICD-10-CM

## 2023-05-16 PROCEDURE — 3079F DIAST BP 80-89 MM HG: CPT | Performed by: INTERNAL MEDICINE

## 2023-05-16 PROCEDURE — 99214 OFFICE O/P EST MOD 30 MIN: CPT | Performed by: INTERNAL MEDICINE

## 2023-05-16 PROCEDURE — 1124F ACP DISCUSS-NO DSCNMKR DOCD: CPT | Performed by: INTERNAL MEDICINE

## 2023-05-16 PROCEDURE — 93000 ELECTROCARDIOGRAM COMPLETE: CPT | Performed by: INTERNAL MEDICINE

## 2023-05-16 PROCEDURE — 3074F SYST BP LT 130 MM HG: CPT | Performed by: INTERNAL MEDICINE

## 2023-05-16 RX ORDER — OMEPRAZOLE 20 MG/1
40 CAPSULE, DELAYED RELEASE ORAL DAILY
COMMUNITY

## 2023-05-16 NOTE — PROGRESS NOTES
visit.        REVIEW OF SYSTEMS:    CONSTITUTIONAL: No major weight gain or loss, fatigue, weakness, night sweats or fever. HEENT: No new vision difficulties or ringing in the ears. RESPIRATORY: No new SOB, PND, orthopnea or cough. CARDIOVASCULAR: See HPI  GI: No nausea, vomiting, diarrhea, constipation, abdominal pain or changes in bowel habits. : No urinary frequency, urgency, incontinence hematuria or dysuria. SKIN: No cyanosis or skin lesions. MUSCULOSKELETAL: No new muscle or joint pain. NEUROLOGICAL: No syncope or TIA-like symptoms. PSYCHIATRIC: No anxiety, pain, insomnia or depression    Objective:   PHYSICAL EXAM:        VITALS:    Wt Readings from Last 3 Encounters:   05/16/23 236 lb (107 kg)   05/15/23 256 lb (116.1 kg)   03/16/23 265 lb 3.2 oz (120.3 kg)     BP Readings from Last 3 Encounters:   05/16/23 120/88   05/15/23 134/89   03/16/23 (!) 160/100     Pulse Readings from Last 3 Encounters:   05/16/23 60   05/15/23 78   03/16/23 64       CONSTITUTIONAL: Cooperative, no apparent distress, and appears well nourished / developed  NEUROLOGIC:  Awake and orientated to person, place and time. PSYCH: Calm affect. SKIN: Warm and dry. HEENT: Sclera non-icteric, normocephalic, neck supple, no elevation of JVP, normal carotid pulses with no bruits and thyroid normal size. LUNGS:  No increased work of breathing and clear to auscultation, no crackles or wheezing  CARDIOVASCULAR:  Regular rate and rhythm with no murmurs, gallops, rubs, or abnormal heart sounds, normal PMI. The apical impulses not displaced  Heart tones are crisp and normal  Cervical veins are not engorged  The carotid upstroke is normal in amplitude and contour without delay or bruit  JVP is not elevated  ABDOMEN:  Normal bowel sounds, non-distended and non-tender to palpation  EXT: No edema, no calf tenderness. Pulses are present bilaterally.     DATA:    Lab Results   Component Value Date    ALT 36 09/02/2020    AST 51 (H) 09/02/2020

## 2023-05-16 NOTE — PROGRESS NOTES
[facility] PRE-SURGICAL TESTING INSTRUCTIONS                      PRIOR TO PROCEDURE DATE:    1. PLEASE FOLLOW ANY INSTRUCTIONS GIVEN TO YOU PER YOUR SURGEON. 2. Arrange for someone to drive you home and be with you for the first 24 hours after discharge for your safety after your procedure for which you received sedation. Ensure it is someone we can share information with regarding your discharge. NOTE: At this time ONLY 2 ADULTS may accompany you   One person ENCOURAGED to stay at hospital entire time if outpatient surgery      3. You must contact your surgeon for instructions IF:  You are taking any blood thinners, aspirin, anti-inflammatory or vitamins. There is a change in your physical condition such as a cold, fever, rash, cuts, sores, or any other infection, especially near your surgical site. 4. Do not drink alcohol the day before or day of your procedure. Do not use any recreational marijuana at least 24 hours or street drugs (heroin, cocaine) at minimum 5 days prior to your procedure. 5. A Pre-Surgical History and Physical MUST be completed WITHIN 30 DAYS OR LESS prior to your procedure. by your Physician or an Urgent Care        THE DAY OF YOUR PROCEDURE:  1. Follow instructions for ARRIVAL TIME as DIRECTED BY YOUR SURGEON. 2. Enter the MAIN entrance from [address] and follow the signs to the free Parking [employer] or [employer] (offered free of charge 7 am-5pm). 3. Enter the Main Entrance of the hospital (do not enter from the lower level of the parking garage). Upon entrance, check in with the  at the surgical information desk on your LEFT. Bring your insurance card and photo ID to register      4. DO NOT EAT ANYTHING 8 hours prior to arrival for surgery. You may have up to 8 ounces of water 4 hours prior to your arrival for surgery.    NOTE: ALL Gastric, Bariatric & Bowel surgery patients - you MUST follow your surgeon's instructions regarding

## 2023-05-18 ENCOUNTER — ANESTHESIA EVENT (OUTPATIENT)
Dept: OPERATING ROOM | Age: 73
End: 2023-05-18
Payer: MEDICARE

## 2023-05-18 ENCOUNTER — TELEPHONE (OUTPATIENT)
Dept: BARIATRICS/WEIGHT MGMT | Age: 73
End: 2023-05-18

## 2023-05-18 NOTE — TELEPHONE ENCOUNTER
Spoke with pt  and his wife to confirm his 5:30 am arrival for his 7:15 am  surgery on 5/19/23. Pt reminded to be NPO. Pt obtained medical clearance. Pt obtained cardiac clearance.      Pt's last dose of Eliquis was 5/16/23

## 2023-05-19 ENCOUNTER — HOSPITAL ENCOUNTER (OUTPATIENT)
Age: 73
Setting detail: OUTPATIENT SURGERY
Discharge: HOME | End: 2023-05-19
Attending: SURGERY | Admitting: SURGERY
Payer: MEDICARE

## 2023-05-19 ENCOUNTER — ANESTHESIA (OUTPATIENT)
Dept: OPERATING ROOM | Age: 73
End: 2023-05-19
Payer: MEDICARE

## 2023-05-19 VITALS
SYSTOLIC BLOOD PRESSURE: 153 MMHG | HEART RATE: 60 BPM | TEMPERATURE: 97 F | DIASTOLIC BLOOD PRESSURE: 100 MMHG | HEIGHT: 74 IN | WEIGHT: 254.6 LBS | OXYGEN SATURATION: 99 % | BODY MASS INDEX: 32.67 KG/M2 | RESPIRATION RATE: 16 BRPM

## 2023-05-19 DIAGNOSIS — N18.6 END STAGE RENAL DISEASE (HCC): Primary | ICD-10-CM

## 2023-05-19 LAB
ABO + RH BLD: NORMAL
ANION GAP SERPL CALCULATED.3IONS-SCNC: 11 MMOL/L (ref 3–16)
APTT BLD: 27 SEC (ref 22.7–35.9)
BLD GP AB SCN SERPL QL: NORMAL
BUN SERPL-MCNC: 67 MG/DL (ref 7–20)
CALCIUM SERPL-MCNC: 8.9 MG/DL (ref 8.3–10.6)
CHLORIDE SERPL-SCNC: 110 MMOL/L (ref 99–110)
CO2 SERPL-SCNC: 23 MMOL/L (ref 21–32)
CREAT SERPL-MCNC: 5.4 MG/DL (ref 0.8–1.3)
GFR SERPLBLD CREATININE-BSD FMLA CKD-EPI: 11 ML/MIN/{1.73_M2}
GLUCOSE SERPL-MCNC: 85 MG/DL (ref 70–99)
INR PPP: 1.05 (ref 0.84–1.16)
POTASSIUM SERPL-SCNC: 4.3 MMOL/L (ref 3.5–5.1)
PROTHROMBIN TIME: 13.7 SEC (ref 11.5–14.8)
SODIUM SERPL-SCNC: 144 MMOL/L (ref 136–145)

## 2023-05-19 PROCEDURE — 86850 RBC ANTIBODY SCREEN: CPT

## 2023-05-19 PROCEDURE — 2580000003 HC RX 258: Performed by: SURGERY

## 2023-05-19 PROCEDURE — 49435 INSERT SUBQ EXTEN TO IP CATH: CPT | Performed by: SURGERY

## 2023-05-19 PROCEDURE — 49324 LAP INSERT TUNNEL IP CATH: CPT | Performed by: SURGERY

## 2023-05-19 PROCEDURE — 3700000000 HC ANESTHESIA ATTENDED CARE: Performed by: SURGERY

## 2023-05-19 PROCEDURE — 6360000002 HC RX W HCPCS: Performed by: NURSE ANESTHETIST, CERTIFIED REGISTERED

## 2023-05-19 PROCEDURE — 3700000001 HC ADD 15 MINUTES (ANESTHESIA): Performed by: SURGERY

## 2023-05-19 PROCEDURE — 86901 BLOOD TYPING SEROLOGIC RH(D): CPT

## 2023-05-19 PROCEDURE — 3600000004 HC SURGERY LEVEL 4 BASE: Performed by: SURGERY

## 2023-05-19 PROCEDURE — 7100000000 HC PACU RECOVERY - FIRST 15 MIN: Performed by: SURGERY

## 2023-05-19 PROCEDURE — 7100000001 HC PACU RECOVERY - ADDTL 15 MIN: Performed by: SURGERY

## 2023-05-19 PROCEDURE — 85730 THROMBOPLASTIN TIME PARTIAL: CPT

## 2023-05-19 PROCEDURE — 2709999900 HC NON-CHARGEABLE SUPPLY: Performed by: SURGERY

## 2023-05-19 PROCEDURE — 2500000003 HC RX 250 WO HCPCS: Performed by: NURSE ANESTHETIST, CERTIFIED REGISTERED

## 2023-05-19 PROCEDURE — 7100000010 HC PHASE II RECOVERY - FIRST 15 MIN: Performed by: SURGERY

## 2023-05-19 PROCEDURE — 49594 RPR AA HRN 1ST 3-10 NCR/STRN: CPT | Performed by: SURGERY

## 2023-05-19 PROCEDURE — 6360000002 HC RX W HCPCS: Performed by: SURGERY

## 2023-05-19 PROCEDURE — 6360000002 HC RX W HCPCS: Performed by: ANESTHESIOLOGY

## 2023-05-19 PROCEDURE — 3600000014 HC SURGERY LEVEL 4 ADDTL 15MIN: Performed by: SURGERY

## 2023-05-19 PROCEDURE — 86900 BLOOD TYPING SEROLOGIC ABO: CPT

## 2023-05-19 PROCEDURE — 6370000000 HC RX 637 (ALT 250 FOR IP): Performed by: SURGERY

## 2023-05-19 PROCEDURE — 80048 BASIC METABOLIC PNL TOTAL CA: CPT

## 2023-05-19 PROCEDURE — 2500000003 HC RX 250 WO HCPCS: Performed by: SURGERY

## 2023-05-19 PROCEDURE — 7100000011 HC PHASE II RECOVERY - ADDTL 15 MIN: Performed by: SURGERY

## 2023-05-19 PROCEDURE — C1750 CATH, HEMODIALYSIS,LONG-TERM: HCPCS | Performed by: SURGERY

## 2023-05-19 PROCEDURE — 2580000003 HC RX 258: Performed by: ANESTHESIOLOGY

## 2023-05-19 PROCEDURE — 2580000003 HC RX 258: Performed by: NURSE ANESTHETIST, CERTIFIED REGISTERED

## 2023-05-19 PROCEDURE — C1781 MESH (IMPLANTABLE): HCPCS | Performed by: SURGERY

## 2023-05-19 PROCEDURE — L0450 TLSO FLEX TRUNK/THOR PRE OTS: HCPCS | Performed by: SURGERY

## 2023-05-19 PROCEDURE — A4217 STERILE WATER/SALINE, 500 ML: HCPCS | Performed by: SURGERY

## 2023-05-19 PROCEDURE — 49326 LAP W/OMENTOPEXY ADD-ON: CPT | Performed by: SURGERY

## 2023-05-19 PROCEDURE — 85610 PROTHROMBIN TIME: CPT

## 2023-05-19 DEVICE — MESH HERN REP DIA8CM P4HB MFIL RESRB RND W/ HYDRGEL BARR: Type: IMPLANTABLE DEVICE | Status: FUNCTIONAL

## 2023-05-19 RX ORDER — LABETALOL HYDROCHLORIDE 5 MG/ML
10 INJECTION, SOLUTION INTRAVENOUS
Status: COMPLETED | OUTPATIENT
Start: 2023-05-19 | End: 2023-05-19

## 2023-05-19 RX ORDER — FENTANYL CITRATE 50 UG/ML
INJECTION, SOLUTION INTRAMUSCULAR; INTRAVENOUS PRN
Status: DISCONTINUED | OUTPATIENT
Start: 2023-05-19 | End: 2023-05-19 | Stop reason: SDUPTHER

## 2023-05-19 RX ORDER — HEPARIN SODIUM (PORCINE) LOCK FLUSH IV SOLN 100 UNIT/ML 100 UNIT/ML
SOLUTION INTRAVENOUS PRN
Status: DISCONTINUED | OUTPATIENT
Start: 2023-05-19 | End: 2023-05-19 | Stop reason: HOSPADM

## 2023-05-19 RX ORDER — SODIUM CHLORIDE 0.9 % (FLUSH) 0.9 %
5-40 SYRINGE (ML) INJECTION EVERY 12 HOURS SCHEDULED
Status: DISCONTINUED | OUTPATIENT
Start: 2023-05-19 | End: 2023-05-19 | Stop reason: HOSPADM

## 2023-05-19 RX ORDER — BUPIVACAINE HYDROCHLORIDE 5 MG/ML
INJECTION, SOLUTION EPIDURAL; INTRACAUDAL PRN
Status: DISCONTINUED | OUTPATIENT
Start: 2023-05-19 | End: 2023-05-19 | Stop reason: ALTCHOICE

## 2023-05-19 RX ORDER — ONDANSETRON 2 MG/ML
INJECTION INTRAMUSCULAR; INTRAVENOUS PRN
Status: DISCONTINUED | OUTPATIENT
Start: 2023-05-19 | End: 2023-05-19 | Stop reason: SDUPTHER

## 2023-05-19 RX ORDER — PROCHLORPERAZINE EDISYLATE 5 MG/ML
5 INJECTION INTRAMUSCULAR; INTRAVENOUS
Status: DISCONTINUED | OUTPATIENT
Start: 2023-05-19 | End: 2023-05-19 | Stop reason: HOSPADM

## 2023-05-19 RX ORDER — MAGNESIUM HYDROXIDE 1200 MG/15ML
LIQUID ORAL PRN
Status: DISCONTINUED | OUTPATIENT
Start: 2023-05-19 | End: 2023-05-19 | Stop reason: HOSPADM

## 2023-05-19 RX ORDER — SODIUM CHLORIDE 9 MG/ML
INJECTION, SOLUTION INTRAVENOUS CONTINUOUS
Status: DISCONTINUED | OUTPATIENT
Start: 2023-05-19 | End: 2023-05-19 | Stop reason: HOSPADM

## 2023-05-19 RX ORDER — CHLORTHALIDONE 25 MG/1
25 TABLET ORAL DAILY
COMMUNITY
Start: 2023-03-29

## 2023-05-19 RX ORDER — HYDRALAZINE HYDROCHLORIDE 20 MG/ML
10 INJECTION INTRAMUSCULAR; INTRAVENOUS
Status: COMPLETED | OUTPATIENT
Start: 2023-05-19 | End: 2023-05-19

## 2023-05-19 RX ORDER — SODIUM CHLORIDE 0.9 % (FLUSH) 0.9 %
5-40 SYRINGE (ML) INJECTION PRN
Status: DISCONTINUED | OUTPATIENT
Start: 2023-05-19 | End: 2023-05-19 | Stop reason: HOSPADM

## 2023-05-19 RX ORDER — DOCUSATE SODIUM 100 MG/1
100 CAPSULE, LIQUID FILLED ORAL 2 TIMES DAILY
Qty: 60 CAPSULE | Refills: 0 | Status: SHIPPED | OUTPATIENT
Start: 2023-05-19 | End: 2023-06-18

## 2023-05-19 RX ORDER — SODIUM CHLORIDE 9 MG/ML
INJECTION, SOLUTION INTRAVENOUS CONTINUOUS PRN
Status: DISCONTINUED | OUTPATIENT
Start: 2023-05-19 | End: 2023-05-19 | Stop reason: SDUPTHER

## 2023-05-19 RX ORDER — DEXAMETHASONE SODIUM PHOSPHATE 4 MG/ML
INJECTION, SOLUTION INTRA-ARTICULAR; INTRALESIONAL; INTRAMUSCULAR; INTRAVENOUS; SOFT TISSUE PRN
Status: DISCONTINUED | OUTPATIENT
Start: 2023-05-19 | End: 2023-05-19 | Stop reason: SDUPTHER

## 2023-05-19 RX ORDER — MAGNESIUM HYDROXIDE 1200 MG/15ML
LIQUID ORAL CONTINUOUS PRN
Status: DISCONTINUED | OUTPATIENT
Start: 2023-05-19 | End: 2023-05-19 | Stop reason: HOSPADM

## 2023-05-19 RX ORDER — ONDANSETRON 2 MG/ML
4 INJECTION INTRAMUSCULAR; INTRAVENOUS
Status: DISCONTINUED | OUTPATIENT
Start: 2023-05-19 | End: 2023-05-19 | Stop reason: HOSPADM

## 2023-05-19 RX ORDER — ROCURONIUM BROMIDE 10 MG/ML
INJECTION, SOLUTION INTRAVENOUS PRN
Status: DISCONTINUED | OUTPATIENT
Start: 2023-05-19 | End: 2023-05-19 | Stop reason: SDUPTHER

## 2023-05-19 RX ORDER — LIDOCAINE HYDROCHLORIDE 20 MG/ML
INJECTION, SOLUTION INFILTRATION; PERINEURAL PRN
Status: DISCONTINUED | OUTPATIENT
Start: 2023-05-19 | End: 2023-05-19 | Stop reason: SDUPTHER

## 2023-05-19 RX ORDER — PROPOFOL 10 MG/ML
INJECTION, EMULSION INTRAVENOUS PRN
Status: DISCONTINUED | OUTPATIENT
Start: 2023-05-19 | End: 2023-05-19 | Stop reason: SDUPTHER

## 2023-05-19 RX ORDER — OXYCODONE HYDROCHLORIDE 5 MG/1
5 TABLET ORAL PRN
Status: DISCONTINUED | OUTPATIENT
Start: 2023-05-19 | End: 2023-05-19 | Stop reason: HOSPADM

## 2023-05-19 RX ORDER — MAGNESIUM CARB/ALUMINUM HYDROX 105-160MG
TABLET,CHEWABLE ORAL PRN
Status: DISCONTINUED | OUTPATIENT
Start: 2023-05-19 | End: 2023-05-19 | Stop reason: HOSPADM

## 2023-05-19 RX ORDER — SODIUM CHLORIDE 9 MG/ML
INJECTION, SOLUTION INTRAVENOUS PRN
Status: DISCONTINUED | OUTPATIENT
Start: 2023-05-19 | End: 2023-05-19 | Stop reason: HOSPADM

## 2023-05-19 RX ORDER — OXYCODONE HYDROCHLORIDE 5 MG/1
10 TABLET ORAL PRN
Status: DISCONTINUED | OUTPATIENT
Start: 2023-05-19 | End: 2023-05-19 | Stop reason: HOSPADM

## 2023-05-19 RX ORDER — OXYCODONE HYDROCHLORIDE 5 MG/1
5 TABLET ORAL EVERY 6 HOURS PRN
Qty: 28 TABLET | Refills: 0 | Status: SHIPPED | OUTPATIENT
Start: 2023-05-19 | End: 2023-05-26

## 2023-05-19 RX ORDER — HEPARIN SODIUM 5000 [USP'U]/ML
5000 INJECTION, SOLUTION INTRAVENOUS; SUBCUTANEOUS ONCE
Status: COMPLETED | OUTPATIENT
Start: 2023-05-19 | End: 2023-05-19

## 2023-05-19 RX ADMIN — DEXMEDETOMIDINE HYDROCHLORIDE 2 MCG: 100 INJECTION, SOLUTION INTRAVENOUS at 07:24

## 2023-05-19 RX ADMIN — HYDRALAZINE HYDROCHLORIDE 10 MG: 20 INJECTION INTRAMUSCULAR; INTRAVENOUS at 10:48

## 2023-05-19 RX ADMIN — FENTANYL CITRATE 50 MCG: 50 INJECTION, SOLUTION INTRAMUSCULAR; INTRAVENOUS at 07:24

## 2023-05-19 RX ADMIN — PROPOFOL 160 MG: 10 INJECTION, EMULSION INTRAVENOUS at 07:24

## 2023-05-19 RX ADMIN — VANCOMYCIN HYDROCHLORIDE 1000 MG: 10 INJECTION, POWDER, LYOPHILIZED, FOR SOLUTION INTRAVENOUS at 07:28

## 2023-05-19 RX ADMIN — SODIUM CHLORIDE: 9 INJECTION, SOLUTION INTRAVENOUS at 06:21

## 2023-05-19 RX ADMIN — DEXMEDETOMIDINE HYDROCHLORIDE 4 MCG: 100 INJECTION, SOLUTION INTRAVENOUS at 09:27

## 2023-05-19 RX ADMIN — LIDOCAINE HYDROCHLORIDE 100 MG: 20 INJECTION, SOLUTION INFILTRATION; PERINEURAL at 07:24

## 2023-05-19 RX ADMIN — FENTANYL CITRATE 100 MCG: 50 INJECTION, SOLUTION INTRAMUSCULAR; INTRAVENOUS at 07:51

## 2023-05-19 RX ADMIN — ONDANSETRON 4 MG: 2 INJECTION INTRAMUSCULAR; INTRAVENOUS at 07:32

## 2023-05-19 RX ADMIN — ROCURONIUM BROMIDE 30 MG: 10 INJECTION INTRAVENOUS at 08:36

## 2023-05-19 RX ADMIN — HYDROMORPHONE HYDROCHLORIDE 0.25 MG: 1 INJECTION, SOLUTION INTRAMUSCULAR; INTRAVENOUS; SUBCUTANEOUS at 10:31

## 2023-05-19 RX ADMIN — DEXMEDETOMIDINE HYDROCHLORIDE 2 MCG: 100 INJECTION, SOLUTION INTRAVENOUS at 07:15

## 2023-05-19 RX ADMIN — PROPOFOL 40 MG: 10 INJECTION, EMULSION INTRAVENOUS at 07:52

## 2023-05-19 RX ADMIN — DEXMEDETOMIDINE HYDROCHLORIDE 2 MCG: 100 INJECTION, SOLUTION INTRAVENOUS at 09:15

## 2023-05-19 RX ADMIN — HEPARIN SODIUM 5000 UNITS: 5000 INJECTION INTRAVENOUS; SUBCUTANEOUS at 06:48

## 2023-05-19 RX ADMIN — SUGAMMADEX 200 MG: 100 INJECTION, SOLUTION INTRAVENOUS at 09:51

## 2023-05-19 RX ADMIN — ROCURONIUM BROMIDE 70 MG: 10 INJECTION INTRAVENOUS at 07:24

## 2023-05-19 RX ADMIN — DEXAMETHASONE SODIUM PHOSPHATE 4 MG: 4 INJECTION, SOLUTION INTRAMUSCULAR; INTRAVENOUS at 07:32

## 2023-05-19 RX ADMIN — HYDRALAZINE HYDROCHLORIDE 10 MG: 20 INJECTION INTRAMUSCULAR; INTRAVENOUS at 11:03

## 2023-05-19 RX ADMIN — SODIUM CHLORIDE: 9 INJECTION, SOLUTION INTRAVENOUS at 07:15

## 2023-05-19 RX ADMIN — FENTANYL CITRATE 50 MCG: 50 INJECTION, SOLUTION INTRAMUSCULAR; INTRAVENOUS at 07:15

## 2023-05-19 ASSESSMENT — PAIN DESCRIPTION - LOCATION
LOCATION: ABDOMEN
LOCATION: ABDOMEN

## 2023-05-19 ASSESSMENT — PAIN - FUNCTIONAL ASSESSMENT: PAIN_FUNCTIONAL_ASSESSMENT: 0-10

## 2023-05-19 ASSESSMENT — PAIN DESCRIPTION - DESCRIPTORS
DESCRIPTORS: DISCOMFORT
DESCRIPTORS: DISCOMFORT

## 2023-05-19 ASSESSMENT — PAIN SCALES - GENERAL
PAINLEVEL_OUTOF10: 0
PAINLEVEL_OUTOF10: 4
PAINLEVEL_OUTOF10: 0
PAINLEVEL_OUTOF10: 6

## 2023-05-19 ASSESSMENT — PAIN DESCRIPTION - ORIENTATION
ORIENTATION: MID
ORIENTATION: MID

## 2023-05-19 ASSESSMENT — PAIN DESCRIPTION - ONSET: ONSET: ON-GOING

## 2023-05-19 ASSESSMENT — PAIN DESCRIPTION - FREQUENCY
FREQUENCY: CONTINUOUS
FREQUENCY: CONTINUOUS

## 2023-05-19 ASSESSMENT — LIFESTYLE VARIABLES: SMOKING_STATUS: 0

## 2023-05-19 NOTE — ANESTHESIA PRE PROCEDURE
Department of Anesthesiology  Preprocedure Note       Name:  Pb Sauceda   Age:  72 y.o.  :  1950                                          MRN:  [id number]         Date:  2023      Surgeon: Shivam Augustin): Vaibhav Lambert, DO Vaibhav Lambert DO    Procedure: Procedure(s):  LAPAROSCOPIC PERITONEAL DIALYSIS CATHETER PLACEMENT; ROBOTIC INCARCERATED VENTRAL HERNIA REPAIR  . Medications prior to admission:   Prior to Admission medications    Medication Sig Start Date End Date Taking?  Authorizing Provider   sodium zirconium cyclosilicate (LOKELMA) 10 g PACK oral suspension Take 10 g by mouth daily 23  Yes Historical Provider, MD   omeprazole (PRILOSEC) 20 MG delayed release capsule Take 2 capsules by mouth daily   Yes Historical Provider, MD   chlorthalidone (HYGROTON) 25 MG tablet Take 1 tablet by mouth daily 3/29/23   Historical Provider, MD   apixaban (ELIQUIS) 5 MG TABS tablet Take 1 tablet by mouth 2 times daily 3/24/23   Claudette Ege, APRN - CNP   carvedilol (COREG) 25 MG tablet TAKE ONE TABLET BY MOUTH TWICE A DAY WITH MEALS 10/3/22   Claudette Ege, APRN - CNP   isosorbide mononitrate (IMDUR) 30 MG extended release tablet TAKE ONE TABLET BY MOUTH DAILY 22   Claudette Ege, APRN - CNP   amiodarone (CORDARONE) 200 MG tablet Take one tablet by mouth daily 22   Claudette Ege, APRN - CNP   sildenafil (VIAGRA) 100 MG tablet TAKE ONE TABLET BY MOUTH DAILY AS NEEDED FOR ERECTILE DYSFUNCTION 22   Faby Ritchie MD   levothyroxine (SYNTHROID) 50 MCG tablet Take 1 tablet by mouth daily 20   Historical Provider, MD   allopurinol (ZYLOPRIM) 300 MG tablet Take 1 tablet by mouth daily  Patient taking differently: Take 100 mg by mouth daily 10/15/18   Jeremy Hall MD       Current medications:    Current Facility-Administered Medications   Medication Dose Route Frequency Provider Last Rate Last Admin    vancomycin (VANCOCIN) 1,000 mg in sodium chloride 0.9 % 250 mL IVPB  1,000 mg IntraVENous

## 2023-05-19 NOTE — ANESTHESIA POSTPROCEDURE EVALUATION
Department of Anesthesiology  Postprocedure Note    Patient: Scott Vyas  MRN: [id number]  YOB: 1950  Date of evaluation: 5/19/2023      Procedure Summary     Date: 05/19/23 Room / Location: [address] [place] [address]    Anesthesia Start: 0719 Anesthesia Stop: 1004    Procedure: [address]; ROBOTIC INCARCERATED VENTRAL HERNIA REPAIR WITH BIOSYNTHETIC MESH, ROBOTIC OMENTOPEXY (Abdomen) Diagnosis:       Chronic kidney disease, stage V ([address]      Ventral hernia without obstruction or gangrene      (Chronic kidney disease, stage V ([address] [N18.5])      (Ventral hernia without obstruction or gangrene [K43.9])    Surgeons: Samra Reddy DO Responsible Provider: Raquel Sánchez DO    Anesthesia Type: general ASA Status: 3          Anesthesia Type: No value filed.     Janeth Phase I: Janeth Score: 10    Janeth Phase II: Janeth Score: 9      Anesthesia Post Evaluation    Patient location during evaluation: PACU  Patient participation: complete - patient participated  Level of consciousness: awake and alert  Airway patency: patent  Nausea & Vomiting: no nausea and no vomiting  Cardiovascular status: blood pressure returned to baseline  Respiratory status: acceptable  Hydration status: euvolemic

## 2023-05-19 NOTE — PROGRESS NOTES
Patient to pacu 13 s/p LAPAROSCOPIC PERITONEAL DIALYSIS CATHETER PLACEMENT; ROBOTIC INCARCERATED VENTRAL HERNIA REPAIR WITH BIOSYNTHETIC MESH, ROBOTIC OMENTOPEXY  General, report received from CRNA, reported hemodynamically stable intra op, all vitals stable upon arrival, patient writing around stating \"I got to get up\", denies pain at this time. Attempted to reorient patient.  Dressings intact at this time

## 2023-05-19 NOTE — DISCHARGE INSTRUCTIONS
AM - 7 PM      SAME DAY SERVICES:  [phone] M-F 7AM-6PM        If you smoke STOP. We care about your health!

## 2023-05-19 NOTE — H&P
Updated History & Physical     The patient's History and Physical of 5/15/23 was reviewed with the patient, and I examined the patient. There was no change. The surgical site was confirmed by the patient and me. Plan: The risks, benefits, expected outcome, and alternative to the recommended procedure have been discussed with the patient. Patient understands and wants to proceed with the procedure.       Electronically signed by Jen Romero MD on 05/19/23 at 6:46 AM.

## 2023-05-19 NOTE — PROGRESS NOTES
Ambulatory Surgery/Procedure Discharge Note    Vitals:    05/19/23 1131   BP: (!) 153/100   Pulse: 60   Resp: 16   Temp: 97 °F (36.1 °C)   SpO2: 99%     Patient meets criteria for discharge per Janeth score. In: 950 [I.V.:700]  Out: R [address]    Restroom use offered before discharge. Yes    Pain assessment:  none  Pain Level: 0    Pt and wife states \"ready to go home\". Pt alert and oriented x4. IV removed. Denies N/V or pain. Abdominal dressing-C,D,I. Discharge instructions given to pt and wife with pt permission. Pt and wife verbalized understanding of all instructions. Left with all belongings, 2 prescriptions, and discharge instructions. Patient discharged to home/self care. Patient discharged via wheel chair by transporter to waiting family.        5/19/2023 12:00 PM

## 2023-05-19 NOTE — PROGRESS NOTES
PACU Transfer to [facility]    Vitals:    05/19/23 1115   BP: (!) 154/99   Pulse: 60   Resp: 12   Temp: 97.1 °F (36.2 °C)   SpO2: 100%         Intake/Output Summary (Last 24 hours) at 5/19/2023 1121  Last data filed at 5/19/2023 0956  Gross per 24 hour   Intake 950 ml   Output 375 ml   Net 575 ml       Pain assessment:  present - adequately treated  Pain Level: 4    Patient transferred to care of YI RN.    5/19/2023 11:21 AM

## 2023-05-19 NOTE — PROGRESS NOTES
Called and updated Dr Juan Lockwood on patients blood pressure 160/102 post hydralazine admin, order to give another 10mg, and if pressure trends down, ok for discharge

## 2023-05-19 NOTE — BRIEF OP NOTE
Brief Postoperative Note      Patient: Luciana Land  YOB: 1950  MRN: [id number]    Date of Procedure: 5/19/2023    Pre-Op Diagnosis Codes:     * Chronic kidney disease, stage V ([facility] [address] [N18.5]     * Ventral hernia without obstruction or gangrene [K43.9]    Post-Op Diagnosis: Same       Procedure(s):  LAPAROSCOPIC PERITONEAL DIALYSIS CATHETER PLACEMENT; ROBOTIC INCARCERATED VENTRAL HERNIA REPAIR WITH BIOSYNTHETIC MESH, ROBOTIC OMENTOPEXY  . Surgeon(s):   DO Gomez Brown DO    Assistant:  Surgical Assistant: Milo Devlin  Resident: Perez Johnson MD; Saint Allegra, MD    Anesthesia: General    Estimated Blood Loss (mL): less than 50     Complications: None    Specimens:   * No specimens in log *    Implants:  * No implants in log *      Drains:   [REMOVED] Urinary Catheter 05/19/23 Mata (Removed)       Findings: Robotic-assisted ventral hernia repair with intraperitoneal onlay mesh (Phas[address] ST), robotic-assisted omentopexy, laparoscopic placement of 62cm peritoneal dialysis catheter       Electronically signed by Saint Allegra, MD on 5/19/2023 at 9:58 AM

## 2023-05-22 PROBLEM — E66.9 OBESITY (BMI 30.0-34.9): Status: ACTIVE | Noted: 2023-05-22

## 2023-05-22 PROBLEM — N18.6 END STAGE RENAL DISEASE (HCC): Status: ACTIVE | Noted: 2023-05-22

## 2023-05-22 PROBLEM — E66.811 OBESITY (BMI 30.0-34.9): Status: ACTIVE | Noted: 2023-05-22

## 2023-05-22 PROBLEM — K43.6 INCARCERATED VENTRAL HERNIA: Status: ACTIVE | Noted: 2023-05-22

## 2023-05-23 NOTE — OP NOTE
[facility] [address], [address]                                OPERATIVE REPORT    PATIENT NAME: Vianey Lockhart                     :        1950  MED REC NO:   [id number]                          ROOM:  ACCOUNT NO:   [de-identified]                           ADMIT DATE: 2023  PROVIDER:     Ariana Coker DO    DATE OF PROCEDURE:  2023    PREOPERATIVE DIAGNOSES:  1. Incarcerated ventral hernia measuring 4.0 cm.  2.  End-stage renal disease. 3.  Obesity with BMI 32.69. POSTOPERATIVE DIAGNOSES:  1. Incarcerated ventral hernia measuring 4.0 cm.  2.  End-stage renal disease. 3.  Obesity with BMI 32.69. PROCEDURES PERFORMED:  1.  Robotic incarcerated ventral hernia repair with biosynthetic mesh  measuring 4.0 cm.  2.  Laparoscopic peritoneal dialysis catheter placement. 3.  Robotic omentopexy. 4.  Subcutaneous extension. SURGEON:  Ariana Coker DO    ASSISTANT:  1.  Diana Gray. 2.  Johnie Sands. ANESTHESIA:  General endotracheal.    COMPLICATIONS:  None. CONDITION:  Stable. ESTIMATED BLOOD LOSS:  15 mL. INDICATIONS FOR PROCEDURE:  The patient is a 79-year-old, end-stage  renal disease patient with an incarcerated fat-containing ventral hernia  who was seen for urgent dialysis catheter placement. He was boarded and  consented after understanding all the risks, benefits and alternatives  of the procedure. DESCRIPTION OF PROCEDURE:  The patient was brought to the operative  suite, laid in supine position with arms outstretched. After induction  of general endotracheal anesthesia, the tube was confirmed to be in  place with end-tidal CO2 and secured. Mata catheter was placed with  clear return of urine. The patient was prepped and draped in usual  sterile manner. A small incision was made at the left midclavicular line.   Using the  Veress needle, abdomen was entered and pneumoperitoneum

## 2023-05-31 ENCOUNTER — OFFICE VISIT (OUTPATIENT)
Dept: BARIATRICS/WEIGHT MGMT | Age: 73
End: 2023-05-31

## 2023-05-31 VITALS
WEIGHT: 253 LBS | HEIGHT: 74 IN | DIASTOLIC BLOOD PRESSURE: 89 MMHG | SYSTOLIC BLOOD PRESSURE: 131 MMHG | BODY MASS INDEX: 32.47 KG/M2

## 2023-05-31 DIAGNOSIS — N18.6 ESRD (END STAGE RENAL DISEASE) (HCC): Primary | ICD-10-CM

## 2023-05-31 PROCEDURE — 99024 POSTOP FOLLOW-UP VISIT: CPT | Performed by: SURGERY

## 2023-06-05 NOTE — PROGRESS NOTES
Patient doing well  No N/V/F/C  Tolerating diet  Having BM's    Incisions C/D/I    2 weeks s/p PD catheter placement/hernia repair    Ok to use catheter     F/U PRN    Pelon Jin

## 2023-06-18 DIAGNOSIS — I42.9 CARDIOMYOPATHY, UNSPECIFIED TYPE (HCC): ICD-10-CM

## 2023-06-18 DIAGNOSIS — I48.91 ATRIAL FIBRILLATION, UNSPECIFIED TYPE (HCC): ICD-10-CM

## 2023-06-18 DIAGNOSIS — I10 ESSENTIAL HYPERTENSION: ICD-10-CM

## 2023-06-19 RX ORDER — AMIODARONE HYDROCHLORIDE 200 MG/1
TABLET ORAL
Qty: 90 TABLET | Refills: 3 | Status: SHIPPED | OUTPATIENT
Start: 2023-06-19

## 2023-08-02 DIAGNOSIS — I10 ESSENTIAL HYPERTENSION: ICD-10-CM

## 2023-08-02 DIAGNOSIS — I48.91 ATRIAL FIBRILLATION, UNSPECIFIED TYPE (HCC): ICD-10-CM

## 2023-08-02 DIAGNOSIS — I42.9 CARDIOMYOPATHY, UNSPECIFIED TYPE (HCC): ICD-10-CM

## 2023-08-02 RX ORDER — ISOSORBIDE MONONITRATE 30 MG/1
TABLET, EXTENDED RELEASE ORAL
Qty: 90 TABLET | Refills: 2 | Status: SHIPPED | OUTPATIENT
Start: 2023-08-02

## 2023-09-20 PROCEDURE — 93295 DEV INTERROG REMOTE 1/2/MLT: CPT | Performed by: INTERNAL MEDICINE

## 2023-09-20 PROCEDURE — 93296 REM INTERROG EVL PM/IDS: CPT | Performed by: INTERNAL MEDICINE

## 2023-10-10 ENCOUNTER — OFFICE VISIT (OUTPATIENT)
Dept: CARDIOLOGY CLINIC | Age: 73
End: 2023-10-10
Payer: MEDICARE

## 2023-10-10 VITALS
HEART RATE: 81 BPM | HEIGHT: 74 IN | WEIGHT: 261 LBS | SYSTOLIC BLOOD PRESSURE: 170 MMHG | DIASTOLIC BLOOD PRESSURE: 96 MMHG | BODY MASS INDEX: 33.5 KG/M2

## 2023-10-10 DIAGNOSIS — Z79.01 LONG TERM CURRENT USE OF ANTICOAGULANT: ICD-10-CM

## 2023-10-10 DIAGNOSIS — R06.02 SOB (SHORTNESS OF BREATH): ICD-10-CM

## 2023-10-10 DIAGNOSIS — Z01.818 PRE-OP TESTING: Primary | ICD-10-CM

## 2023-10-10 DIAGNOSIS — I42.9 CARDIOMYOPATHY, UNSPECIFIED TYPE (HCC): ICD-10-CM

## 2023-10-10 DIAGNOSIS — I48.91 ATRIAL FIBRILLATION, UNSPECIFIED TYPE (HCC): ICD-10-CM

## 2023-10-10 PROCEDURE — 93000 ELECTROCARDIOGRAM COMPLETE: CPT | Performed by: INTERNAL MEDICINE

## 2023-10-10 PROCEDURE — 3078F DIAST BP <80 MM HG: CPT | Performed by: INTERNAL MEDICINE

## 2023-10-10 PROCEDURE — 3074F SYST BP LT 130 MM HG: CPT | Performed by: INTERNAL MEDICINE

## 2023-10-10 PROCEDURE — 1124F ACP DISCUSS-NO DSCNMKR DOCD: CPT | Performed by: INTERNAL MEDICINE

## 2023-10-10 PROCEDURE — 99214 OFFICE O/P EST MOD 30 MIN: CPT | Performed by: INTERNAL MEDICINE

## 2023-10-10 RX ORDER — SPIRONOLACTONE 25 MG/1
TABLET ORAL
COMMUNITY
Start: 2023-09-21

## 2023-10-10 RX ORDER — GLECAPREVIR AND PIBRENTASVIR 40; 100 MG/1; MG/1
TABLET, FILM COATED ORAL
COMMUNITY
Start: 2023-10-05

## 2023-10-10 RX ORDER — HYDROCHLOROTHIAZIDE 25 MG/1
25 TABLET ORAL 2 TIMES DAILY
Qty: 180 TABLET | Refills: 3 | Status: SHIPPED | OUTPATIENT
Start: 2023-10-10

## 2023-10-10 RX ORDER — FUROSEMIDE 80 MG
80 TABLET ORAL DAILY
COMMUNITY
Start: 2023-09-21

## 2023-10-10 NOTE — PROGRESS NOTES
Eliquis. For blood pressure we will initiate hydralazine 25 mg twice daily. Obtain a current echocardiogram.  Return to see me in 3 months. Please call if we can assist further [phone]. Yaz Rausch MD, [facility]      This note was likely completed using voice recognition technology and may contain unintended errors

## 2023-11-01 NOTE — PROGRESS NOTES
with an estimated  ejection fraction of 40-45%. Mild global hypokinesis. Indeterminate diastolic function. Abnormal Global strain: -7.9%  The left atrium is dilated. The right atrium is dilated. The aortic root is dilated measuring 4.4 cm. The ascending aorta is dilated measuring 4.6 cm. Mitral annular calcification is present. Trivial mitral regurgitation. The aortic valve appears sclerotic but opens well. Pacer / ICD wire is visualized in the right ventricle. Mild tricuspid regurgitation. Trivial pulmonic regurgitation present. Estimated pulmonary artery systolic pressure is at mmHg assuming a right  atrial pressure of 3 mmHg. Stress Test:   5/9/2019  There is anterior and lateral reversible ischemia  The LVEF is 56% with normal LV wall motion. This is an intermediate risk scan. Cath:  5/10/2019   mild to moderate CAD did not require intervention    Other imaging:     Assessment and Plan     -Atrial Fibrillation,Paroxysmal    CHADSvasc is at least 2 (HTN, AGE)    HASbled is at least 4, (HTN,RENAL DISEASE,AGE, ELIQUIS)       Patient may be candidate for Watchman due the need to stop [address] for Kidney transplant,at this time he is going to think about this. He will follow up with Nate Brooks. Shared Decision  DAPT post implant        CKD  Peritoneal dialysis    Cardiomyopathy   Defibrillator    CAD  Stable    HLD  LDL 81(5/9/2019)        Discussed left atrial appendage closure at length with patient and family members. I have discussed their unique stroke and bleeding risk both on and off oral-anticoagulation, and the rationale for this referral.  Based on both stroke and bleeding risk, a shared decision has been made to pursue closure of the left atrial appendage as an alternative to oral anticoagulant therapy for stroke prophylaxis and to reduce their long term risk of incidence of bleeding. I had a detail discussion with the patient and family regarding the risk and benefit of the procedures.  I

## 2023-11-02 ENCOUNTER — OFFICE VISIT (OUTPATIENT)
Dept: CARDIOLOGY CLINIC | Age: 73
End: 2023-11-02

## 2023-11-02 ENCOUNTER — TELEPHONE (OUTPATIENT)
Dept: CARDIOLOGY CLINIC | Age: 73
End: 2023-11-02

## 2023-11-02 VITALS
OXYGEN SATURATION: 100 % | HEIGHT: 74 IN | DIASTOLIC BLOOD PRESSURE: 82 MMHG | HEART RATE: 88 BPM | SYSTOLIC BLOOD PRESSURE: 118 MMHG | WEIGHT: 255 LBS | BODY MASS INDEX: 32.73 KG/M2

## 2023-11-02 DIAGNOSIS — I42.0 DILATED CARDIOMYOPATHY (HCC): ICD-10-CM

## 2023-11-02 DIAGNOSIS — Z95.810 PRESENCE OF CARDIAC RESYNCHRONIZATION THERAPY DEFIBRILLATOR (CRT-D): ICD-10-CM

## 2023-11-02 DIAGNOSIS — I25.10 CORONARY ARTERY DISEASE INVOLVING NATIVE CORONARY ARTERY OF NATIVE HEART WITHOUT ANGINA PECTORIS: ICD-10-CM

## 2023-11-02 DIAGNOSIS — I48.0 PAROXYSMAL ATRIAL FIBRILLATION (HCC): Primary | ICD-10-CM

## 2023-11-02 DIAGNOSIS — E78.2 MIXED HYPERLIPIDEMIA: ICD-10-CM

## 2023-11-02 NOTE — TELEPHONE ENCOUNTER
Appointment for [facility] consult with Dr. Dayana Sandra on 11/2/2023 at 1 PM. Patient confirmed appointment

## 2023-11-06 ENCOUNTER — TELEPHONE (OUTPATIENT)
Dept: CARDIOLOGY CLINIC | Age: 73
End: 2023-11-06

## 2023-11-07 NOTE — TELEPHONE ENCOUNTER
Patient was seen regarding the Watchman procedure. Educational material was given as well as patient watchman a video on Watchman procedure. At this time patient unsure if he wants to pursue the Watchman procedure,  Patient would like a call back in a week or 2 in the hopes he has decided to pursue the Watchman or not.

## 2023-11-07 NOTE — TELEPHONE ENCOUNTER
CARDIAC CLEARANCE     What type of procedure are you having? Tooth extraction    Which physician is performing your procedure? Andre Gomez DDS    When is your procedure scheduled for? 11/6/23    Where are you having this procedure? [address] [facility], [address] [place] [address]      Are you taking Blood Thinners? yes  apixaban (ELIQUIS) 5 MG TABS tablet   If so what? (Name/dose/frequesncy)    Does the surgeon want you to stop your blood thinner? No  If so for how long? Was informed the doctor usually prescribes Ibuprofen for pain.     Phone Number and Contact Name for Physicians office: Eric Castellano  [phone]    Fax number to send information: [phone]
Neyda Carlson np spoke with patient may hold eliquis 2 days prior to procedure if needed
No

## 2023-11-22 NOTE — TELEPHONE ENCOUNTER
I spoke with patients wife and they have not decided to pursue or not the Watchman. She informed me he was getting a Dialysis treatment and will call me back later.

## 2023-12-04 NOTE — TELEPHONE ENCOUNTER
I spoke with patient and at this time wants to hold off from the Claudia Sergiodell not sure if he wants to pursue or not. I instructed patient to talk to Dr. La Becker if he wants to pursue the device again.

## 2023-12-20 PROCEDURE — 93296 REM INTERROG EVL PM/IDS: CPT | Performed by: INTERNAL MEDICINE

## 2023-12-20 PROCEDURE — 93295 DEV INTERROG REMOTE 1/2/MLT: CPT | Performed by: INTERNAL MEDICINE

## 2024-01-03 ENCOUNTER — HOSPITAL ENCOUNTER (OUTPATIENT)
Dept: NON INVASIVE DIAGNOSTICS | Age: 74
Discharge: HOME | End: 2024-01-03
Attending: INTERNAL MEDICINE
Payer: MEDICARE

## 2024-01-03 DIAGNOSIS — I42.9 CARDIOMYOPATHY, UNSPECIFIED TYPE (HCC): ICD-10-CM

## 2024-01-03 DIAGNOSIS — I48.91 ATRIAL FIBRILLATION, UNSPECIFIED TYPE (HCC): ICD-10-CM

## 2024-01-03 DIAGNOSIS — R06.02 SOB (SHORTNESS OF BREATH): ICD-10-CM

## 2024-01-03 PROCEDURE — 93306 TTE W/DOPPLER COMPLETE: CPT

## 2024-01-25 ENCOUNTER — TELEPHONE (OUTPATIENT)
Dept: CARDIOLOGY CLINIC | Age: 74
End: 2024-01-25

## 2024-01-25 NOTE — TELEPHONE ENCOUNTER
Recommend patient's wife take the patient to the ER, patient's wife said that is what they would do.   Yanick Tim

## 2024-01-25 NOTE — TELEPHONE ENCOUNTER
Patients wife Frank called stating her  fell on 1/23/24 and fell again 1/24/24. She stated he has occasional chest pain that only lasts a few minutes and that his left arm has been bothering him. Told his wife if his symptoms get worse before she receives a phone call to go to the [facility].  Purvi [facility] [phone]

## 2024-02-19 ENCOUNTER — OFFICE VISIT (OUTPATIENT)
Dept: CARDIOLOGY CLINIC | Age: 74
End: 2024-02-19
Payer: MEDICARE

## 2024-02-19 VITALS
SYSTOLIC BLOOD PRESSURE: 122 MMHG | DIASTOLIC BLOOD PRESSURE: 86 MMHG | WEIGHT: 238 LBS | HEART RATE: 68 BPM | BODY MASS INDEX: 30.54 KG/M2

## 2024-02-19 DIAGNOSIS — I48.0 PAROXYSMAL ATRIAL FIBRILLATION (HCC): Primary | ICD-10-CM

## 2024-02-19 PROCEDURE — 3079F DIAST BP 80-89 MM HG: CPT | Performed by: INTERNAL MEDICINE

## 2024-02-19 PROCEDURE — 1124F ACP DISCUSS-NO DSCNMKR DOCD: CPT | Performed by: INTERNAL MEDICINE

## 2024-02-19 PROCEDURE — G8428 CUR MEDS NOT DOCUMENT: HCPCS | Performed by: INTERNAL MEDICINE

## 2024-02-19 PROCEDURE — G8417 CALC BMI ABV UP PARAM F/U: HCPCS | Performed by: INTERNAL MEDICINE

## 2024-02-19 PROCEDURE — 3017F COLORECTAL CA SCREEN DOC REV: CPT | Performed by: INTERNAL MEDICINE

## 2024-02-19 PROCEDURE — 3074F SYST BP LT 130 MM HG: CPT | Performed by: INTERNAL MEDICINE

## 2024-02-19 PROCEDURE — 99214 OFFICE O/P EST MOD 30 MIN: CPT | Performed by: INTERNAL MEDICINE

## 2024-02-19 PROCEDURE — G8484 FLU IMMUNIZE NO ADMIN: HCPCS | Performed by: INTERNAL MEDICINE

## 2024-02-19 PROCEDURE — 1036F TOBACCO NON-USER: CPT | Performed by: INTERNAL MEDICINE

## 2024-02-20 NOTE — PROGRESS NOTES
[facility] [place]   Dr Ruben Rausch MD, [facility]    Outpatient Follow Up Note    2/20/2024,12:23 PM  Subjective:   CHIEF COMPLAINT / HPI:  Follow Up secondary to CKD and atrial fibrillation     Brandon Ramirez is 74 y.o. male who presents today here for follow-up on 2/19/2024 for his cardiac status.  Since I last saw him he has been admitted at [facility] with a kink in his peritoneal dialysis catheter that had to be surgically treated and removed and replaced.  He is doing better his wound has healed.  He is still doing peritoneal dialysis is very content with it.  Still trying to get in position for a potential kidney transplant.  From a cardiac perspective he has seen Dr. Hernandes for evaluation for a Watchman device and that is being scheduled.  This peritoneal dialysis catheter cannot get in the way and delayed the progress to the Watchman device.  From our perspective he is stable clinically at this time.  And should proceed with the Watchman device when he can get it scheduled.           CKD now on peritoneal dialysis started spring 2023  History of pacemaker/defibrillator implant 7/7/2020  Cardiomyopathy 40 to 45% EF July 2020  History of atrial fibrillation on Eliquis  Hyperlipidemia LDL 81  CAD last cath on 5/10/2019 mild to moderate CAD did not require intervention  Past Medical History:    Past Medical History:   Diagnosis Date    Abdominal hernia     Atrial fibrillation (HCC)     CHF (congestive heart failure) ([facility])     Dr. Rausch(cardiologist)    CKD (chronic kidney disease) stage 3, GFR 30-59 ml/min (HCC)     COPD (chronic obstructive pulmonary disease) ([facility])     Fracture     R index finger and L ankle    Gout 2000    Hnands/feet/elbows    Hypertension     Pacemaker      Past Surgical History  Past Surgical History:   Procedure Laterality Date    CARDIAC CATHETERIZATION  05/01/2004    COLONOSCOPY  05/31/2022    COLONOSCOPY POLYPECTOMY SNARE/COLD BIOPSY performed

## 2024-02-28 DIAGNOSIS — I48.91 ATRIAL FIBRILLATION, UNSPECIFIED TYPE (HCC): ICD-10-CM

## 2024-02-28 DIAGNOSIS — I42.9 CARDIOMYOPATHY, UNSPECIFIED TYPE (HCC): ICD-10-CM

## 2024-02-28 DIAGNOSIS — I10 ESSENTIAL HYPERTENSION: ICD-10-CM

## 2024-02-28 RX ORDER — CARVEDILOL 25 MG/1
TABLET ORAL
Qty: 180 TABLET | Refills: 3 | Status: SHIPPED | OUTPATIENT
Start: 2024-02-28

## 2024-08-05 ENCOUNTER — OFFICE VISIT (OUTPATIENT)
Dept: CARDIOLOGY CLINIC | Age: 74
End: 2024-08-05
Payer: MEDICARE

## 2024-08-05 VITALS
SYSTOLIC BLOOD PRESSURE: 106 MMHG | WEIGHT: 227 LBS | DIASTOLIC BLOOD PRESSURE: 80 MMHG | HEART RATE: 64 BPM | HEIGHT: 74 IN | BODY MASS INDEX: 29.13 KG/M2

## 2024-08-05 DIAGNOSIS — I42.9 CARDIOMYOPATHY, UNSPECIFIED TYPE (HCC): Primary | ICD-10-CM

## 2024-08-05 DIAGNOSIS — E78.2 MIXED HYPERLIPIDEMIA: ICD-10-CM

## 2024-08-05 DIAGNOSIS — I48.91 ATRIAL FIBRILLATION, UNSPECIFIED TYPE (HCC): ICD-10-CM

## 2024-08-05 PROCEDURE — 3074F SYST BP LT 130 MM HG: CPT | Performed by: INTERNAL MEDICINE

## 2024-08-05 PROCEDURE — 1036F TOBACCO NON-USER: CPT | Performed by: INTERNAL MEDICINE

## 2024-08-05 PROCEDURE — 3079F DIAST BP 80-89 MM HG: CPT | Performed by: INTERNAL MEDICINE

## 2024-08-05 PROCEDURE — 99214 OFFICE O/P EST MOD 30 MIN: CPT | Performed by: INTERNAL MEDICINE

## 2024-08-05 PROCEDURE — G8427 DOCREV CUR MEDS BY ELIG CLIN: HCPCS | Performed by: INTERNAL MEDICINE

## 2024-08-05 PROCEDURE — 1124F ACP DISCUSS-NO DSCNMKR DOCD: CPT | Performed by: INTERNAL MEDICINE

## 2024-08-05 PROCEDURE — 3017F COLORECTAL CA SCREEN DOC REV: CPT | Performed by: INTERNAL MEDICINE

## 2024-08-05 PROCEDURE — G8417 CALC BMI ABV UP PARAM F/U: HCPCS | Performed by: INTERNAL MEDICINE

## 2024-08-05 RX ORDER — LEVOTHYROXINE SODIUM 137 UG/1
137 TABLET ORAL DAILY
COMMUNITY
Start: 2024-06-12

## 2024-08-05 RX ORDER — LANOLIN ALCOHOL/MO/W.PET/CERES
6 CREAM (GRAM) TOPICAL NIGHTLY PRN
COMMUNITY
Start: 2024-08-01

## 2024-08-05 RX ORDER — FUROSEMIDE 80 MG
80 TABLET ORAL DAILY
COMMUNITY
Start: 2024-08-01

## 2024-08-05 NOTE — PROGRESS NOTES
[facility] [place]   Dr Ruben Rausch MD, [facility]    Outpatient Follow Up Note    8/5/2024,11:03 AM  Subjective:   CHIEF COMPLAINT / HPI:  Follow Up secondary to CKD and atrial fibrillation     Brandon Ramirez is 74 y.o. male who presents today here for follow-up on 8/5/2024.  Since I last saw him he apparently had quite a bit of sepsis from his peritoneal dialysis catheter.  Was at  and they catheter sheath was removed and he was on antibiotics.  And he did have a right subclavian dialysis sheath placed and he is using it currently.  Not having any chest pains or shortness of breath or dyspnea.  His atrial fibrillation has been stable.  He did not have the Watchman device yet but is still being in process.  He looks good and feels good and has some mild chronic lower extremity edema.         CKD now hemodialysis recently started in July 2024 after his peritoneal dialysis became septic.  History of pacemaker/defibrillator implant 7/7/2020  Cardiomyopathy 40 to 45% EF July 2020  History of atrial fibrillation on Eliquis.  Being considered for a Watchman device  Hyperlipidemia LDL 81  CAD last cath on 5/10/2019 mild to moderate CAD did not require intervention  Past Medical History:    Past Medical History:   Diagnosis Date    Abdominal hernia     Atrial fibrillation (HCC)     CHF (congestive heart failure) ([facility])     Dr. Rausch(cardiologist)    CKD (chronic kidney disease) stage 3, GFR 30-59 ml/min (HCC)     COPD (chronic obstructive pulmonary disease) ([facility])     Fracture     R index finger and L ankle    Gout 2000    Hnands/feet/elbows    Hypertension     Pacemaker      Past Surgical History  Past Surgical History:   Procedure Laterality Date    CARDIAC CATHETERIZATION  05/01/2004    COLONOSCOPY  05/31/2022    COLONOSCOPY POLYPECTOMY SNARE/COLD BIOPSY performed by Curly Duong MD at [facility] ENDOSCOPY    DIALYSIS CATHETER INSERTION N/A 5/19/2023    LAPAROSCOPIC PERITONEAL

## 2024-08-08 PROCEDURE — 93296 REM INTERROG EVL PM/IDS: CPT | Performed by: INTERNAL MEDICINE

## 2024-08-08 PROCEDURE — 93295 DEV INTERROG REMOTE 1/2/MLT: CPT | Performed by: INTERNAL MEDICINE

## 2024-11-07 ENCOUNTER — TELEPHONE (OUTPATIENT)
Dept: CARDIOLOGY CLINIC | Age: 74
End: 2024-11-07

## 2024-11-07 NOTE — TELEPHONE ENCOUNTER
Per [facility]PER via [employer] message: \"Dual tachycardia on 8/8/2024.  This was not consistently sensed.  Would consider changing VT detection to 380 ms.\" No upcoming device/EP appts.

## 2024-12-18 ENCOUNTER — NURSE ONLY (OUTPATIENT)
Dept: CARDIOLOGY CLINIC | Age: 74
End: 2024-12-18

## 2024-12-18 ENCOUNTER — OFFICE VISIT (OUTPATIENT)
Dept: CARDIOLOGY CLINIC | Age: 74
End: 2024-12-18
Payer: MEDICARE

## 2024-12-18 VITALS
BODY MASS INDEX: 26.78 KG/M2 | SYSTOLIC BLOOD PRESSURE: 120 MMHG | WEIGHT: 208.6 LBS | HEART RATE: 80 BPM | DIASTOLIC BLOOD PRESSURE: 70 MMHG

## 2024-12-18 DIAGNOSIS — I50.20 HFREF (HEART FAILURE WITH REDUCED EJECTION FRACTION) (HCC): ICD-10-CM

## 2024-12-18 DIAGNOSIS — I10 PRIMARY HYPERTENSION: ICD-10-CM

## 2024-12-18 DIAGNOSIS — R53.83 OTHER FATIGUE: ICD-10-CM

## 2024-12-18 DIAGNOSIS — I48.0 PAROXYSMAL ATRIAL FIBRILLATION (HCC): Primary | ICD-10-CM

## 2024-12-18 DIAGNOSIS — I48.0 PAF (PAROXYSMAL ATRIAL FIBRILLATION) (HCC): ICD-10-CM

## 2024-12-18 DIAGNOSIS — I42.0 DILATED CARDIOMYOPATHY (HCC): ICD-10-CM

## 2024-12-18 DIAGNOSIS — Z95.810 PRESENCE OF CARDIAC RESYNCHRONIZATION THERAPY DEFIBRILLATOR (CRT-D): Primary | ICD-10-CM

## 2024-12-18 DIAGNOSIS — I25.5 ISCHEMIC CARDIOMYOPATHY: ICD-10-CM

## 2024-12-18 DIAGNOSIS — Z79.899 LONG TERM CURRENT USE OF AMIODARONE: ICD-10-CM

## 2024-12-18 PROCEDURE — 1036F TOBACCO NON-USER: CPT | Performed by: NURSE PRACTITIONER

## 2024-12-18 PROCEDURE — 3017F COLORECTAL CA SCREEN DOC REV: CPT | Performed by: NURSE PRACTITIONER

## 2024-12-18 PROCEDURE — 1124F ACP DISCUSS-NO DSCNMKR DOCD: CPT | Performed by: NURSE PRACTITIONER

## 2024-12-18 PROCEDURE — G8484 FLU IMMUNIZE NO ADMIN: HCPCS | Performed by: NURSE PRACTITIONER

## 2024-12-18 PROCEDURE — 3078F DIAST BP <80 MM HG: CPT | Performed by: NURSE PRACTITIONER

## 2024-12-18 PROCEDURE — G8417 CALC BMI ABV UP PARAM F/U: HCPCS | Performed by: NURSE PRACTITIONER

## 2024-12-18 PROCEDURE — G8427 DOCREV CUR MEDS BY ELIG CLIN: HCPCS | Performed by: NURSE PRACTITIONER

## 2024-12-18 PROCEDURE — 99214 OFFICE O/P EST MOD 30 MIN: CPT | Performed by: NURSE PRACTITIONER

## 2024-12-18 PROCEDURE — 1159F MED LIST DOCD IN RCRD: CPT | Performed by: NURSE PRACTITIONER

## 2024-12-18 PROCEDURE — 93000 ELECTROCARDIOGRAM COMPLETE: CPT | Performed by: NURSE PRACTITIONER

## 2024-12-18 PROCEDURE — 3074F SYST BP LT 130 MM HG: CPT | Performed by: NURSE PRACTITIONER

## 2024-12-18 NOTE — PROGRESS NOTES
[place] [address]  [phone]    Primary Cardiologist:  Dr Rausch     CC AF, ICD    HPI:  74 y.o. with pAF, cHFrEF (EF 30-35%), HTN, ICD, ESRD/HD and COPD. He c/o intermittent fatigue and bilateral shoulder discomfort. He c/o cramping during dialysis. He denies exertional chest pain, sob, LH/dizziness, palpitations, syncope or falls. No weight gain, orthopnea, edema, abdominal bloating or early satiety. No n/v/d, fever or GI/ bleeding.      Past Medical History:   Diagnosis Date    Abdominal hernia     Atrial fibrillation (HCC)     CHF (congestive heart failure) ([facility])     Dr. Rausch(cardiologist)    CKD (chronic kidney disease) stage 3, GFR 30-59 ml/min (HCC)     COPD (chronic obstructive pulmonary disease) (HCC)     Fracture     R index finger and L ankle    Gout     Hnands/feet/elbows    Hypertension     Pacemaker      Past Surgical History:   Procedure Laterality Date    CARDIAC CATHETERIZATION  2004    COLONOSCOPY  2022    COLONOSCOPY POLYPECTOMY SNARE/COLD BIOPSY performed by Curly Duong MD at [facility] ENDOSCOPY    DIALYSIS CATHETER INSERTION N/A 2023    LAPAROSCOPIC PERITONEAL DIALYSIS CATHETER PLACEMENT; ROBOTIC INCARCERATED VENTRAL HERNIA REPAIR WITH BIOSYNTHETIC MESH, ROBOTIC OMENTOPEXY performed by Kosta Arrington DO at [facility] OR    PACEMAKER PLACEMENT       Family History   Problem Relation Age of Onset    Heart Disease Father         CHF- of    High Blood Pressure Father     Heart Disease Mother     High Blood Pressure Mother     High Blood Pressure Brother         Stent placement x2     Social History     Tobacco Use    Smoking status: Never     Passive exposure: Yes    Smokeless tobacco: Never   Vaping Use    Vaping status: Never Used   Substance Use Topics    Alcohol use: Yes     Alcohol/week: 0.0 standard drinks of alcohol     Comment: occasionally    Drug use: Not Currently     Comment: Quit using in

## 2025-02-05 ENCOUNTER — TELEPHONE (OUTPATIENT)
Dept: CARDIOLOGY CLINIC | Age: 75
End: 2025-02-05

## 2025-02-05 DIAGNOSIS — I10 ESSENTIAL HYPERTENSION: ICD-10-CM

## 2025-02-05 DIAGNOSIS — I42.9 CARDIOMYOPATHY, UNSPECIFIED TYPE (HCC): ICD-10-CM

## 2025-02-05 DIAGNOSIS — I48.91 ATRIAL FIBRILLATION, UNSPECIFIED TYPE (HCC): ICD-10-CM

## 2025-02-05 NOTE — TELEPHONE ENCOUNTER
Pt's spouse called wanting to review medications that pt is supposed to be taking. Once reviewed he will need refills of those medications.     [phone]

## 2025-02-06 ENCOUNTER — TELEPHONE (OUTPATIENT)
Dept: CARDIOLOGY CLINIC | Age: 75
End: 2025-02-06

## 2025-02-06 RX ORDER — CARVEDILOL 25 MG/1
TABLET ORAL
Qty: 180 TABLET | Refills: 2 | Status: SHIPPED | OUTPATIENT
Start: 2025-02-06

## 2025-02-06 RX ORDER — FUROSEMIDE 80 MG/1
80 TABLET ORAL DAILY
Qty: 90 TABLET | Refills: 2 | Status: SHIPPED | OUTPATIENT
Start: 2025-02-06

## 2025-02-06 RX ORDER — AMIODARONE HYDROCHLORIDE 200 MG/1
TABLET ORAL
Qty: 90 TABLET | Refills: 2 | Status: SHIPPED | OUTPATIENT
Start: 2025-02-06

## 2025-02-06 RX ORDER — ISOSORBIDE MONONITRATE 30 MG/1
TABLET, EXTENDED RELEASE ORAL
Qty: 90 TABLET | Refills: 2 | Status: SHIPPED | OUTPATIENT
Start: 2025-02-06

## 2025-02-06 NOTE — TELEPHONE ENCOUNTER
Purvi (spouse) would like to know if Brandon is eligible for watchman as Brandon is ready to move forward.     Please advise    Purvi's callback: [phone]

## 2025-02-06 NOTE — TELEPHONE ENCOUNTER
I spoke with patient and his wife Purvi and they have agreed they would like to move forward with the Watchman device.  Patient was last seen in 11/20223.  I informed patient he would need to see Dr. Hernandes again since it has been 1.5 years since we saw patient last.  He was scheduled to see Dr. Hernandes for Watchman consult on 3/6/2025 at 1 PM.

## 2025-02-06 NOTE — TELEPHONE ENCOUNTER
Discussed Eliquis and I  dont see why he isn't taking his Eliquis ??   A Watchman was  being considered   >> recommend to see Dr Hernandes >. Make an appt to discuss again   Cont Eliquis unless told not to take it

## 2025-02-17 ENCOUNTER — TELEPHONE (OUTPATIENT)
Dept: CARDIOLOGY CLINIC | Age: 75
End: 2025-02-17

## 2025-03-06 NOTE — TELEPHONE ENCOUNTER
I spoke with patient and his wife regarding his upcoming Watchman consult with Dr. Hernandes.  I have asked patient if they prefer to go to [facility] for the consult and they welcomed this.  I have cancelled patients appt on 3/6/2025 and moved it to [facility] on 3/7/2025 scheduled at 10:15.

## 2025-03-07 ENCOUNTER — OFFICE VISIT (OUTPATIENT)
Dept: CARDIOLOGY CLINIC | Age: 75
End: 2025-03-07
Payer: MEDICARE

## 2025-03-07 VITALS
WEIGHT: 208.8 LBS | HEART RATE: 113 BPM | SYSTOLIC BLOOD PRESSURE: 112 MMHG | BODY MASS INDEX: 26.81 KG/M2 | DIASTOLIC BLOOD PRESSURE: 88 MMHG

## 2025-03-07 DIAGNOSIS — I48.0 PAROXYSMAL ATRIAL FIBRILLATION (HCC): Primary | ICD-10-CM

## 2025-03-07 PROCEDURE — 1124F ACP DISCUSS-NO DSCNMKR DOCD: CPT | Performed by: INTERNAL MEDICINE

## 2025-03-07 PROCEDURE — 3079F DIAST BP 80-89 MM HG: CPT | Performed by: INTERNAL MEDICINE

## 2025-03-07 PROCEDURE — G8428 CUR MEDS NOT DOCUMENT: HCPCS | Performed by: INTERNAL MEDICINE

## 2025-03-07 PROCEDURE — G8417 CALC BMI ABV UP PARAM F/U: HCPCS | Performed by: INTERNAL MEDICINE

## 2025-03-07 PROCEDURE — 3074F SYST BP LT 130 MM HG: CPT | Performed by: INTERNAL MEDICINE

## 2025-03-07 PROCEDURE — 3017F COLORECTAL CA SCREEN DOC REV: CPT | Performed by: INTERNAL MEDICINE

## 2025-03-07 PROCEDURE — 99215 OFFICE O/P EST HI 40 MIN: CPT | Performed by: INTERNAL MEDICINE

## 2025-03-07 PROCEDURE — 1036F TOBACCO NON-USER: CPT | Performed by: INTERNAL MEDICINE

## 2025-03-07 NOTE — TELEPHONE ENCOUNTER
Spoke with patient and his wife today regarding Watchman procedure. Patient was shown video on Watchman and given educational material. Patient states interest and would like to move forward with the Device. A Copy of the Cardio smart tool was given for shared decision. Will need a shared decision appointment to be able to move forward.  Instructed patient as well to get an appointment with Dr. Castillo to discuss end of life on his PPM battery.

## 2025-03-07 NOTE — PROGRESS NOTES
[facility]  Cardiology Consult    Brandon Ramirez  1950 March 6, 2025    Primary Cardiologist:   Referring Physician:     Reason for Referral: ***    CC: ***      Subjective:     History of Present Illness:    Brandon Ramirez is a 75 y.o. patient with a PMH significant for *** and presents today ***            Patient is being referred for Left Atrial Appendage Closure with WATCHMAN device for management of stroke risk resulting from non-valvular atrial fibrillation. Based on their past history, it has been determined that they are poor candidates for long-term oral-anticoagulation, however may be tolerant of short term treatment with AC as necessary.        Past Medical History:   has a past medical history of Abdominal hernia, Atrial fibrillation (HCC), CHF (congestive heart failure) (HCC), CKD (chronic kidney disease) stage 3, GFR 30-59 ml/min (HCC), COPD (chronic obstructive pulmonary disease) (HCC), Fracture, Gout, Hypertension, and Pacemaker.    Surgical History:   has a past surgical history that includes Cardiac catheterization (05/01/2004); Colonoscopy (05/31/2022); pacemaker placement; and Dialysis Catheter Insertion (N/A, 5/19/2023).     Social History:   reports that he has never smoked. He has been exposed to tobacco smoke. He has never used smokeless tobacco. He reports current alcohol use. He reports that he does not currently use drugs.     Family History:  family history includes Heart Disease in his father and mother; High Blood Pressure in his brother, father, and mother.    Home Medications:  Were reviewed and are listed in nursing record and/or below  Prior to Admission medications    Medication Sig Start Date End Date Taking? Authorizing Provider   carvedilol (COREG) 25 MG tablet TAKE ONE TABLET BY MOUTH TWICE A DAY WITH MEALS 2/6/25   Misti Lopez APRN - CNP   furosemide (LASIX) 80 MG tablet Take 1 tablet by mouth daily 2/6/25   Misti Lopez APRN - CNP   isosorbide 
fluoroscopic guidance. I explained any risk of bleeding, pericardial effusion and tamponade, perforation of the vessel, stroke, myocardial infarction or death. The patient and family understood the risk and benefits and the details of procedure and would like to go ahead with the procedure. The patient gave informed consent. All questions and concerns answered at this time.     Chronic kidney disease  Peritoneal dialysis    Cardiomyopathy   S/p ICD 7/7/2020. Appears compensated on exam. Continue B-blocker.     Coronary artery disease  Asymptomatic. Continue B-blocker and Imdur.     Follow up as directed by TYE Shen      Thank you for allowing us to participate in the care of Brandon Ramirez.  Please do not hesitate to contact me if you have any questions.    Immanuel Hernandes MD, MPH    [facility]  [address], [address]   [place] [address]  Ph: [phone]  Fax: [phone]      This note was scribed in the presence of Dr Hernandes, by Nguyen Tavarez RN  Physician Attestation:  The scribes documentation has been prepared under my direction and personally reviewed by me in its entirety.     I confirm that the note above accurately reflects all work, treatment, procedures, and medical decision making performed by me.

## 2025-03-17 NOTE — PROGRESS NOTES
stenosis.  There is a large septal  that has some ostial narrowing of 60-70%.   Circumflex: Large vessel probably codominant.  Mild plaquing but no significant stenoses.   Right Coronary: The vessel is probably codominant.  Mild plaque but no significant stenosis.   Left Ventriculogram: Ejection fraction 30-35%.  Anterior wall apex is akinetic suggesting regional wall motion abnormality but we do not find a coronary lesion that would cause this problem.   Right ileofemoral: Normal.   Hemodynamics:   Left Ventricular Pressure: 8  Central Aortic Pressure: 156   Assessment: Mild coronary artery disease but does not require intervention.  Cardiomyopathy ischemic appearing with regional wall motion in the anterior wall and apex akinesia and reduced ejection fraction    The MCOT, echocardiogram, stress test, and coronary angiography/PCI were reviewed by myself and used for my plan of care.     The CIED was interrogated and programmed and I supervised and reviewed all the data. All findings and changes are in device interrogation sheat and reflect my personal interpretation and changes and is scanned to Epic.      Thank you for allowing me to participate in the care of Brandon Ramirez. All questions and concerns were addressed to the patient/family. Alternatives to my treatment were discussed.     IElana RN, am scribing for and in the presence of Dr. Fidel Espinoza. 03/20/25 2:38 PM  TYE Jimenez, Fara Espinoza MD, personally performed the services prescribed in this documentation as scribed by Ms. Elana Kirkland RN in my presence and it is both accurate and complete.       Fara Espinoza MD  Cardiac Electrophysiology  [facility]

## 2025-03-20 ENCOUNTER — CLINICAL SUPPORT (OUTPATIENT)
Dept: CARDIOLOGY CLINIC | Age: 75
End: 2025-03-20

## 2025-03-20 ENCOUNTER — OFFICE VISIT (OUTPATIENT)
Dept: CARDIOLOGY CLINIC | Age: 75
End: 2025-03-20
Payer: MEDICARE

## 2025-03-20 VITALS
DIASTOLIC BLOOD PRESSURE: 86 MMHG | SYSTOLIC BLOOD PRESSURE: 108 MMHG | HEART RATE: 84 BPM | BODY MASS INDEX: 26.71 KG/M2 | WEIGHT: 208 LBS

## 2025-03-20 DIAGNOSIS — Z79.01 LONG TERM CURRENT USE OF ANTICOAGULANT: ICD-10-CM

## 2025-03-20 DIAGNOSIS — I42.0 DILATED CARDIOMYOPATHY (HCC): ICD-10-CM

## 2025-03-20 DIAGNOSIS — I50.20 HFREF (HEART FAILURE WITH REDUCED EJECTION FRACTION) (HCC): ICD-10-CM

## 2025-03-20 DIAGNOSIS — Z79.899 LONG TERM CURRENT USE OF AMIODARONE: ICD-10-CM

## 2025-03-20 DIAGNOSIS — I48.0 PAROXYSMAL ATRIAL FIBRILLATION (HCC): ICD-10-CM

## 2025-03-20 DIAGNOSIS — I10 ESSENTIAL HYPERTENSION: ICD-10-CM

## 2025-03-20 DIAGNOSIS — E78.2 MIXED HYPERLIPIDEMIA: ICD-10-CM

## 2025-03-20 DIAGNOSIS — I48.19 PERSISTENT ATRIAL FIBRILLATION (HCC): Primary | ICD-10-CM

## 2025-03-20 DIAGNOSIS — Z95.810 PRESENCE OF CARDIAC RESYNCHRONIZATION THERAPY DEFIBRILLATOR (CRT-D): Primary | ICD-10-CM

## 2025-03-20 DIAGNOSIS — Z95.810 PRESENCE OF CARDIAC RESYNCHRONIZATION THERAPY DEFIBRILLATOR (CRT-D): ICD-10-CM

## 2025-03-20 DIAGNOSIS — I25.10 CORONARY ARTERY DISEASE INVOLVING NATIVE CORONARY ARTERY OF NATIVE HEART WITHOUT ANGINA PECTORIS: ICD-10-CM

## 2025-03-20 PROCEDURE — 1036F TOBACCO NON-USER: CPT | Performed by: INTERNAL MEDICINE

## 2025-03-20 PROCEDURE — 1124F ACP DISCUSS-NO DSCNMKR DOCD: CPT | Performed by: INTERNAL MEDICINE

## 2025-03-20 PROCEDURE — 99214 OFFICE O/P EST MOD 30 MIN: CPT | Performed by: INTERNAL MEDICINE

## 2025-03-20 PROCEDURE — G8428 CUR MEDS NOT DOCUMENT: HCPCS | Performed by: INTERNAL MEDICINE

## 2025-03-20 PROCEDURE — 3017F COLORECTAL CA SCREEN DOC REV: CPT | Performed by: INTERNAL MEDICINE

## 2025-03-20 PROCEDURE — G2211 COMPLEX E/M VISIT ADD ON: HCPCS | Performed by: INTERNAL MEDICINE

## 2025-03-20 PROCEDURE — 3079F DIAST BP 80-89 MM HG: CPT | Performed by: INTERNAL MEDICINE

## 2025-03-20 PROCEDURE — 3074F SYST BP LT 130 MM HG: CPT | Performed by: INTERNAL MEDICINE

## 2025-03-20 PROCEDURE — G8417 CALC BMI ABV UP PARAM F/U: HCPCS | Performed by: INTERNAL MEDICINE

## 2025-03-20 PROCEDURE — 93000 ELECTROCARDIOGRAM COMPLETE: CPT | Performed by: INTERNAL MEDICINE

## 2025-03-20 NOTE — PATIENT INSTRUCTIONS
Generator Change for Internal Cardioverter Defibrillator    Date of Procedure:     Time of Arrival:     Cardiologist performing procedure: Dr. Castillo    Arrive at [facility] through the main entrance.  Check in at the Outpatient Diagnostic desk on the 1st floor.    Do not eat or drink anything after midnight the night before the procedure. You may brush your teeth and rinse the morning of the procedure.    Take all your other routine medications the morning of the procedure with the following exceptions:  If you are taking diabetic medications, please HOLD on the day of the procedure (including insulin).  If you take Lantus insulin, take HALF of your usual dose the night before.  If you take a water pill, please HOLD on the day of the procedure.  Hold Eliquis the morning of the procedure.    Lab work is due within a week of the procedure. You do not need to be fasting for these labs. This can be done at the [facility] Outpatient lab at [address].    Hold *OAC** for 2 days prior to procedure.    Please use Hibiclens soap (or any antibacterial soap such as Dial) to wash neck, chest, and abdomen the night before (or the morning of) the procedure.      Do not apply any lotion, powder, or deodorant after your shower and the morning of the procedure.    Please bring a list of your medications to the hospital with you.    You must have someone available to drive you home that day and stay with you at home the night of the procedure.    If you are unable to make this appointment, please call [facility], at [phone].

## 2025-03-24 ENCOUNTER — TELEPHONE (OUTPATIENT)
Dept: CARDIOLOGY CLINIC | Age: 75
End: 2025-03-24

## 2025-03-24 NOTE — TELEPHONE ENCOUNTER
Remote transmission scheduled 04.03.25 as requested. Of note, patient's remote monitor will alert once ALMA is reached. His implanted device should vibrate also.

## 2025-04-03 ENCOUNTER — TELEPHONE (OUTPATIENT)
Dept: CARDIOLOGY CLINIC | Age: 75
End: 2025-04-03

## 2025-04-03 NOTE — TELEPHONE ENCOUNTER
Pt verbalized understanding of message below. Advised that whenever his device reaches ALMA and vibrates, he can come into the office to deactivate the alarm.

## 2025-04-03 NOTE — TELEPHONE ENCOUNTER
2 week remote download d/t ALMA approaching (per 03.24.25 tel enc). Battery is at 4%, 1 mos until RRT/ALMA. Patient's remote monitor will alert once ALMA is reached, and his implanted device should vibrate also. Report sent to  for review via Murj.

## 2025-04-23 ENCOUNTER — TELEPHONE (OUTPATIENT)
Dept: CARDIOLOGY CLINIC | Age: 75
End: 2025-04-23

## 2025-04-23 DIAGNOSIS — Z01.818 PRE-OP TESTING: ICD-10-CM

## 2025-04-23 DIAGNOSIS — Z95.818 PRESENCE OF WATCHMAN LEFT ATRIAL APPENDAGE CLOSURE DEVICE: ICD-10-CM

## 2025-04-23 DIAGNOSIS — N39.0 URINARY TRACT INFECTION WITHOUT HEMATURIA, SITE UNSPECIFIED: ICD-10-CM

## 2025-04-23 DIAGNOSIS — I48.0 PAROXYSMAL A-FIB (HCC): Primary | ICD-10-CM

## 2025-04-23 NOTE — PROGRESS NOTES
[facility]     Outpatient Cardiology         Patient Name:  Brandon Ramirez  Requesting Physician: No admitting provider for patient encounter.  Primary Care Physician: Moses Zurita DO    Reason for Consultation/Chief Complaint:   Chief Complaint   Patient presents with    Surgical Consult     2nd opinion for Watchman procedure         History of Present Illness:    HPI     Brandon Ramirezis a 75 y.o. male with PMH of HTN, HLD, COPD, ESRD on HD, PAF on amiodarone, NICMP, EF 30%, s/p SJM CRT-D with surgical implant of epicardial LV lead (12/2010), EF improved to 50-55% (2016), s/p gen change (7/2020, Dr. Batres), s/p LHC (5/2019, Dr. Rausch) showed mild CAD, echo (1/2024) showed EF 30-35%. Per device, AF has been persistent since 6/2024.   Here for second opinion for left atrial appendage closure with watchman device.   Patient would like to be off OAC to be placed on kidney transplant list.  Today we had a lengthy discussion about the risks and benefits of pursuing a Watchman device.  Overall doing well from a heart perspective.  A-fib is controlled, compensated from a heart failure point of view.    PMH  Past Medical History:   Diagnosis Date    Abdominal hernia     Atrial fibrillation (HCC)     CHF (congestive heart failure) ([facility])     Dr. Rausch(cardiologist)    CKD (chronic kidney disease) stage 3, GFR 30-59 ml/min (HCC)     COPD (chronic obstructive pulmonary disease) ([facility])     Fracture     R index finger and L ankle    Gout 2000    Hnands/feet/elbows    Hypertension     Pacemaker        PSH  Past Surgical History:   Procedure Laterality Date    CARDIAC CATHETERIZATION  05/01/2004    COLONOSCOPY  05/31/2022    COLONOSCOPY POLYPECTOMY SNARE/COLD BIOPSY performed by Curly Duong MD at [facility] ENDOSCOPY    DIALYSIS CATHETER INSERTION N/A 5/19/2023    LAPAROSCOPIC PERITONEAL DIALYSIS CATHETER PLACEMENT; ROBOTIC INCARCERATED VENTRAL HERNIA REPAIR WITH BIOSYNTHETIC MESH, ROBOTIC

## 2025-04-24 ENCOUNTER — OFFICE VISIT (OUTPATIENT)
Dept: CARDIOLOGY CLINIC | Age: 75
End: 2025-04-24
Payer: MEDICARE

## 2025-04-24 VITALS
OXYGEN SATURATION: 99 % | DIASTOLIC BLOOD PRESSURE: 60 MMHG | HEART RATE: 84 BPM | WEIGHT: 210.4 LBS | BODY MASS INDEX: 27.01 KG/M2 | SYSTOLIC BLOOD PRESSURE: 110 MMHG

## 2025-04-24 DIAGNOSIS — I48.0 PAROXYSMAL ATRIAL FIBRILLATION (HCC): Primary | ICD-10-CM

## 2025-04-24 DIAGNOSIS — I42.0 DILATED CARDIOMYOPATHY (HCC): ICD-10-CM

## 2025-04-24 DIAGNOSIS — Z95.810 PRESENCE OF CARDIAC RESYNCHRONIZATION THERAPY DEFIBRILLATOR (CRT-D): ICD-10-CM

## 2025-04-24 PROCEDURE — 99214 OFFICE O/P EST MOD 30 MIN: CPT | Performed by: INTERNAL MEDICINE

## 2025-04-24 PROCEDURE — 3074F SYST BP LT 130 MM HG: CPT | Performed by: INTERNAL MEDICINE

## 2025-04-24 PROCEDURE — G8417 CALC BMI ABV UP PARAM F/U: HCPCS | Performed by: INTERNAL MEDICINE

## 2025-04-24 PROCEDURE — 1159F MED LIST DOCD IN RCRD: CPT | Performed by: INTERNAL MEDICINE

## 2025-04-24 PROCEDURE — G2211 COMPLEX E/M VISIT ADD ON: HCPCS | Performed by: INTERNAL MEDICINE

## 2025-04-24 PROCEDURE — 3078F DIAST BP <80 MM HG: CPT | Performed by: INTERNAL MEDICINE

## 2025-04-24 PROCEDURE — G8427 DOCREV CUR MEDS BY ELIG CLIN: HCPCS | Performed by: INTERNAL MEDICINE

## 2025-04-24 PROCEDURE — 1036F TOBACCO NON-USER: CPT | Performed by: INTERNAL MEDICINE

## 2025-04-24 PROCEDURE — 3017F COLORECTAL CA SCREEN DOC REV: CPT | Performed by: INTERNAL MEDICINE

## 2025-04-24 PROCEDURE — 1124F ACP DISCUSS-NO DSCNMKR DOCD: CPT | Performed by: INTERNAL MEDICINE

## 2025-04-24 ASSESSMENT — ENCOUNTER SYMPTOMS
CHEST TIGHTNESS: 0
WHEEZING: 0
BACK PAIN: 0
FACIAL SWELLING: 0
EYE DISCHARGE: 0
VOMITING: 0
COUGH: 0
COLOR CHANGE: 0
BLOOD IN STOOL: 0
ABDOMINAL DISTENTION: 0
ABDOMINAL PAIN: 0
SHORTNESS OF BREATH: 0

## 2025-05-06 RX ORDER — CHLORHEXIDINE GLUCONATE 40 MG/ML
SOLUTION TOPICAL ONCE
Qty: 1 EACH | Refills: 0 | Status: SHIPPED | OUTPATIENT
Start: 2025-05-06 | End: 2025-05-06

## 2025-05-06 NOTE — TELEPHONE ENCOUNTER
Dr. Ruben Rausch 10/10/2023 referred for Watchman procedure.  Dr. Hernandes consulted 11/2/2023.  Dr. Bowers shared decision on 4/24/2025.  Davide done on the day of the procedure.  Insurance to be approved per Hannah Castro

## 2025-05-06 NOTE — TELEPHONE ENCOUNTER
Spoke with patient regarding the scheduling of his Watchman procedure that has been scheduled for 6/9/2025 8:30 AM arrival.  Lab work has been ordered and instructed to have done at [facility] 6/2/2025.  All procedure instructions were to be e-mailed to patient Instructed to contact me with any questions prior to procedure.

## 2025-05-21 NOTE — PROGRESS NOTES
[facility]   Electrophysiology  Office Visit  Date: 5/22/2025    Chief Complaint   Patient presents with    Atrial Fibrillation    Device Check    Cardiomyopathy       HPI/Cardiac HX: Brandon Ramirez is a 75 y.o. male with PMH significant for HTN, HLD, COPD, ESRD on HD, PAF on amiodarone, NICMP, EF 30%, s/p SJM CRT-D with surgical implant of epicardial LV lead (12/2010), EF improved to 50-55% (2016), s/p gen change (7/2020, Dr. Batres), s/p LHC (5/2019, Dr. Rausch) showed mild CAD, echo (1/2024) showed EF 30-35%.      Per device, AF has been persistent since 6/2024. Awaiting second opinion with Dr. Bowers for a Watchman device with Dr. Carty so he can come off OAC to be placed on kidney transplant list.         Interval History:  Patient is here for follow-up for persistent AF, and ICMP, HFrEF, CRT-D management.  Today he presents in V paced.  He denies palpitations heart racing or irregular heartbeat  He remains on amiodarone 200 mg daily, Qtc 503.  He remains on Eliquis 5 mg twice daily with no issues of bleeding or dark tarry stools.  He is scheduled to undergo a Watchman device placement on June 6 per Dr. Hernandes.  His blood pressure is soft at 90/56 in the office today.  He denies lightheaded or dizziness.  Patient denies chest pain, SOB, palpitations/racing heart rate, LH/dizziness, orthopnea/PND or LE Edema.      Review of his device today shows normal functioning CRT-D with stable sensing and pacing thresholds.  86%  Estimated ALMA 1 month, 99% AF burden. V rates appear to be elevated at times.       Assessment:   1. NICMP / HFrEF, EF 30-35%, on Coreg, hydralazine, Imdur, no ACE/ARB/Arni/SGLT2 due to CKD  2. S/p CRT-D  3. Persistent AF, on amiodarone, Coreg, Eliquis  4. HTN, stable on current meds  5. CAD, mild  6. ESRD, on HD      Plan:  Remote transmission in 2 weeks.  If he rates continue to be elevated may need to adjust his BB  Long Term Amiodarone use:    - LFT: ordered               -

## 2025-05-22 ENCOUNTER — CLINICAL SUPPORT (OUTPATIENT)
Dept: CARDIOLOGY CLINIC | Age: 75
End: 2025-05-22

## 2025-05-22 ENCOUNTER — OFFICE VISIT (OUTPATIENT)
Dept: CARDIOLOGY CLINIC | Age: 75
End: 2025-05-22
Payer: MEDICARE

## 2025-05-22 VITALS
WEIGHT: 207.6 LBS | BODY MASS INDEX: 26.65 KG/M2 | HEART RATE: 80 BPM | DIASTOLIC BLOOD PRESSURE: 56 MMHG | SYSTOLIC BLOOD PRESSURE: 90 MMHG

## 2025-05-22 DIAGNOSIS — Z79.01 LONG TERM CURRENT USE OF ANTICOAGULANT: ICD-10-CM

## 2025-05-22 DIAGNOSIS — I25.10 CORONARY ARTERY DISEASE INVOLVING NATIVE CORONARY ARTERY OF NATIVE HEART WITHOUT ANGINA PECTORIS: ICD-10-CM

## 2025-05-22 DIAGNOSIS — I10 ESSENTIAL HYPERTENSION: ICD-10-CM

## 2025-05-22 DIAGNOSIS — Z79.899 LONG TERM CURRENT USE OF AMIODARONE: ICD-10-CM

## 2025-05-22 DIAGNOSIS — I50.20 HFREF (HEART FAILURE WITH REDUCED EJECTION FRACTION) (HCC): ICD-10-CM

## 2025-05-22 DIAGNOSIS — Z95.810 PRESENCE OF CARDIAC RESYNCHRONIZATION THERAPY DEFIBRILLATOR (CRT-D): ICD-10-CM

## 2025-05-22 DIAGNOSIS — I42.8 NICM (NONISCHEMIC CARDIOMYOPATHY) (HCC): ICD-10-CM

## 2025-05-22 DIAGNOSIS — I48.19 PERSISTENT ATRIAL FIBRILLATION (HCC): Primary | ICD-10-CM

## 2025-05-22 PROCEDURE — 3074F SYST BP LT 130 MM HG: CPT | Performed by: NURSE PRACTITIONER

## 2025-05-22 PROCEDURE — 93000 ELECTROCARDIOGRAM COMPLETE: CPT | Performed by: NURSE PRACTITIONER

## 2025-05-22 PROCEDURE — 3078F DIAST BP <80 MM HG: CPT | Performed by: NURSE PRACTITIONER

## 2025-05-22 PROCEDURE — 99214 OFFICE O/P EST MOD 30 MIN: CPT | Performed by: NURSE PRACTITIONER

## 2025-05-22 PROCEDURE — 1124F ACP DISCUSS-NO DSCNMKR DOCD: CPT | Performed by: NURSE PRACTITIONER

## 2025-05-22 PROCEDURE — 1159F MED LIST DOCD IN RCRD: CPT | Performed by: NURSE PRACTITIONER

## 2025-05-22 PROCEDURE — 1160F RVW MEDS BY RX/DR IN RCRD: CPT | Performed by: NURSE PRACTITIONER

## 2025-05-31 ENCOUNTER — HOSPITAL ENCOUNTER (OUTPATIENT)
Age: 75
Discharge: HOME | End: 2025-05-31
Payer: MEDICARE

## 2025-05-31 DIAGNOSIS — I48.0 PAROXYSMAL A-FIB (HCC): ICD-10-CM

## 2025-05-31 DIAGNOSIS — Z01.818 PRE-OP TESTING: ICD-10-CM

## 2025-05-31 LAB
ABO/RH: NORMAL
ANION GAP SERPL CALCULATED.3IONS-SCNC: 14 MMOL/L (ref 3–16)
ANTIBODY SCREEN: NORMAL
BUN SERPL-MCNC: 33 MG/DL (ref 7–20)
CALCIUM SERPL-MCNC: 10.3 MG/DL (ref 8.3–10.6)
CHLORIDE SERPL-SCNC: 98 MMOL/L (ref 99–110)
CO2 SERPL-SCNC: 29 MMOL/L (ref 21–32)
CREAT SERPL-MCNC: 7 MG/DL (ref 0.8–1.3)
DEPRECATED RDW RBC AUTO: 15.3 % (ref 12.4–15.4)
GFR SERPLBLD CREATININE-BSD FMLA CKD-EPI: 8 ML/MIN/{1.73_M2}
GLUCOSE SERPL-MCNC: 102 MG/DL (ref 70–99)
HCT VFR BLD AUTO: 36.1 % (ref 40.5–52.5)
HGB BLD-MCNC: 12.2 G/DL (ref 13.5–17.5)
INR PPP: 1.08 (ref 0.85–1.15)
MCH RBC QN AUTO: 31.6 PG (ref 26–34)
MCHC RBC AUTO-ENTMCNC: 33.9 G/DL (ref 31–36)
MCV RBC AUTO: 93.2 FL (ref 80–100)
PLATELET # BLD AUTO: 143 K/UL (ref 135–450)
PMV BLD AUTO: 8.9 FL (ref 5–10.5)
POTASSIUM SERPL-SCNC: 4.4 MMOL/L (ref 3.5–5.1)
PROTHROMBIN TIME: 14.2 SEC (ref 11.9–14.9)
RBC # BLD AUTO: 3.87 M/UL (ref 4.2–5.9)
SODIUM SERPL-SCNC: 141 MMOL/L (ref 136–145)
WBC # BLD AUTO: 4.8 K/UL (ref 4–11)

## 2025-05-31 PROCEDURE — 86901 BLOOD TYPING SEROLOGIC RH(D): CPT

## 2025-05-31 PROCEDURE — 80048 BASIC METABOLIC PNL TOTAL CA: CPT

## 2025-05-31 PROCEDURE — 36415 COLL VENOUS BLD VENIPUNCTURE: CPT

## 2025-05-31 PROCEDURE — 86900 BLOOD TYPING SEROLOGIC ABO: CPT

## 2025-05-31 PROCEDURE — 85610 PROTHROMBIN TIME: CPT

## 2025-05-31 PROCEDURE — 86850 RBC ANTIBODY SCREEN: CPT

## 2025-05-31 PROCEDURE — 85027 COMPLETE CBC AUTOMATED: CPT

## 2025-06-01 ENCOUNTER — RESULTS FOLLOW-UP (OUTPATIENT)
Dept: CARDIOLOGY CLINIC | Age: 75
End: 2025-06-01

## 2025-06-02 ENCOUNTER — TELEPHONE (OUTPATIENT)
Dept: CARDIOLOGY CLINIC | Age: 75
End: 2025-06-02

## 2025-06-02 NOTE — TELEPHONE ENCOUNTER
When I spoke to the pt, I did advise that we would receive an alert once he reaches ALMA and that he may hear an alert as well. Advised that if he hears that alert to call the office.

## 2025-06-02 NOTE — TELEPHONE ENCOUNTER
Patient would like a call back to discuss pacemaker battery change.    Can reach pt at [phone]

## 2025-06-02 NOTE — TELEPHONE ENCOUNTER
Advised pt that his device check on 5/20 showed 1 month until ALMA. Explained that once it reaches ALMA, we can schedule the gen change. Also, explained the 3 month shellie period of normal device function once it reaches ALMA. Pt verbalized understanding.

## 2025-06-03 LAB
BACTERIA URNS QL MICRO: ABNORMAL /HPF
BILIRUB UR QL STRIP.AUTO: NEGATIVE
CLARITY UR: CLEAR
COLOR UR: YELLOW
EPI CELLS #/AREA URNS AUTO: 1 /HPF (ref 0–5)
GLUCOSE UR STRIP.AUTO-MCNC: NEGATIVE MG/DL
HGB UR QL STRIP.AUTO: NEGATIVE
HYALINE CASTS #/AREA URNS AUTO: 3 /LPF (ref 0–8)
KETONES UR STRIP.AUTO-MCNC: ABNORMAL MG/DL
LEUKOCYTE ESTERASE UR QL STRIP.AUTO: ABNORMAL
NITRITE UR QL STRIP.AUTO: NEGATIVE
PH UR STRIP.AUTO: 8.5 [PH] (ref 5–8)
PROT UR STRIP.AUTO-MCNC: 100 MG/DL
RBC CLUMPS #/AREA URNS AUTO: 1 /HPF (ref 0–4)
SP GR UR STRIP.AUTO: 1.01 (ref 1–1.03)
UA DIPSTICK W REFLEX MICRO PNL UR: YES
URN SPEC COLLECT METH UR: ABNORMAL
UROBILINOGEN UR STRIP-ACNC: 1 E.U./DL
WBC #/AREA URNS AUTO: 1 /HPF (ref 0–5)

## 2025-06-04 ENCOUNTER — RESULTS FOLLOW-UP (OUTPATIENT)
Dept: CARDIOLOGY CLINIC | Age: 75
End: 2025-06-04

## 2025-06-04 RX ORDER — GRANULES FOR ORAL 3 G/1
3 POWDER ORAL ONCE
Status: CANCELLED | OUTPATIENT
Start: 2025-06-04

## 2025-06-04 NOTE — TELEPHONE ENCOUNTER
Watchman 6/9/2025     8:30 AM Brandon Ramirez 1950 [address]    Spoke with patient and went over all instructions.     Creatinine 7.0 on Dialysis   Glucose 102  Hgb 12.2  Plt 143  U/A UTI placed on antibiotic. Per his Nephrologist will give him antibiotic during his Dialysis.    PT/INR 14.2 / 1.08    CHADSvasc is at least 4 (age, CHF, HTN, CAD)   HASbled is at least 3.      Currently on Eliquis 5 mg BID. He is a poor candidate for chronic anticoagulation with his history of chronic kidney disease (needing transplant).    Dr. Ruben Rausch 10/10/2023 referred for Watchman procedure.  Dr. Hernandes consulted 11/2/2023.  Dr. Bowers shared decision on 4/24/2025.  Davide done on the day of the procedure.  Insurance to be approved per Hannah Castro    Call patient on Friday to review medication/problems. YES  UTI (Antibiotic taken)? UTI placed on antibiotic. Per his Nephrologist will give him antibiotic during his Dialysis.    Any groin issues? look for any type of rash, open skin, etc NO.   On any blood thinners & when to stop taking? Take Eliquis on 6/7/2025 then hold medication. Take 1 Baby Aspirin the day of the procedure.  Lab work addressed prior to admission. YES  Allergies addressed? Yes   Pre-medication necessary? NO  Pt. staying overnight? Yes, needs Dialysis.  PT/INR, T&C & Glucose ordered for day of procedure on every patient. YES  \"Electrophysiology Testing\" order placed on every patient? YES

## 2025-06-09 ENCOUNTER — ANESTHESIA EVENT (OUTPATIENT)
Age: 75
DRG: 273 | End: 2025-06-09
Payer: MEDICARE

## 2025-06-09 ENCOUNTER — ANESTHESIA (OUTPATIENT)
Age: 75
DRG: 273 | End: 2025-06-09
Payer: MEDICARE

## 2025-06-09 ENCOUNTER — HOSPITAL ENCOUNTER (INPATIENT)
Age: 75
LOS: 1 days | Discharge: HOME | DRG: 273 | End: 2025-06-10
Attending: INTERNAL MEDICINE | Admitting: INTERNAL MEDICINE
Payer: MEDICARE

## 2025-06-09 ENCOUNTER — APPOINTMENT (OUTPATIENT)
Age: 75
DRG: 273 | End: 2025-06-09
Attending: INTERNAL MEDICINE
Payer: MEDICARE

## 2025-06-09 DIAGNOSIS — I48.91 ATRIAL FIBRILLATION, UNSPECIFIED TYPE (HCC): ICD-10-CM

## 2025-06-09 DIAGNOSIS — Z01.818 PRE-OP TESTING: ICD-10-CM

## 2025-06-09 DIAGNOSIS — I48.0 PAROXYSMAL A-FIB (HCC): ICD-10-CM

## 2025-06-09 DIAGNOSIS — I42.9 CARDIOMYOPATHY, UNSPECIFIED TYPE (HCC): ICD-10-CM

## 2025-06-09 DIAGNOSIS — I48.0 PAROXYSMAL ATRIAL FIBRILLATION (HCC): ICD-10-CM

## 2025-06-09 DIAGNOSIS — I10 ESSENTIAL HYPERTENSION: ICD-10-CM

## 2025-06-09 LAB
ABO/RH: NORMAL
ANION GAP SERPL CALCULATED.3IONS-SCNC: 12 MMOL/L (ref 3–16)
ANTIBODY SCREEN: NORMAL
BUN SERPL-MCNC: 32 MG/DL (ref 7–20)
CALCIUM SERPL-MCNC: 8.8 MG/DL (ref 8.3–10.6)
CHLORIDE SERPL-SCNC: 103 MMOL/L (ref 99–110)
CO2 SERPL-SCNC: 24 MMOL/L (ref 21–32)
CREAT SERPL-MCNC: 7.8 MG/DL (ref 0.8–1.3)
ECHO BSA: 2.21 M2
ECHO BSA: 2.21 M2
EKG ATRIAL RATE: 80 BPM
EKG DIAGNOSIS: NORMAL
EKG Q-T INTERVAL: 420 MS
EKG QRS DURATION: 152 MS
EKG QTC CALCULATION (BAZETT): 502 MS
EKG R AXIS: -38 DEGREES
EKG T AXIS: 188 DEGREES
EKG VENTRICULAR RATE: 86 BPM
GFR SERPLBLD CREATININE-BSD FMLA CKD-EPI: 7 ML/MIN/{1.73_M2}
GLUCOSE SERPL-MCNC: 79 MG/DL (ref 70–99)
POTASSIUM SERPL-SCNC: 4.9 MMOL/L (ref 3.5–5.1)
SODIUM SERPL-SCNC: 139 MMOL/L (ref 136–145)

## 2025-06-09 PROCEDURE — C1889 IMPLANT/INSERT DEVICE, NOC: HCPCS | Performed by: INTERNAL MEDICINE

## 2025-06-09 PROCEDURE — 3700000000 HC ANESTHESIA ATTENDED CARE: Performed by: INTERNAL MEDICINE

## 2025-06-09 PROCEDURE — 93005 ELECTROCARDIOGRAM TRACING: CPT | Performed by: INTERNAL MEDICINE

## 2025-06-09 PROCEDURE — 2709999900 HC NON-CHARGEABLE SUPPLY: Performed by: INTERNAL MEDICINE

## 2025-06-09 PROCEDURE — 3700000001 HC ADD 15 MINUTES (ANESTHESIA): Performed by: INTERNAL MEDICINE

## 2025-06-09 PROCEDURE — 36415 COLL VENOUS BLD VENIPUNCTURE: CPT

## 2025-06-09 PROCEDURE — 86850 RBC ANTIBODY SCREEN: CPT

## 2025-06-09 PROCEDURE — 2720000010 HC SURG SUPPLY STERILE: Performed by: INTERNAL MEDICINE

## 2025-06-09 PROCEDURE — C1894 INTRO/SHEATH, NON-LASER: HCPCS | Performed by: INTERNAL MEDICINE

## 2025-06-09 PROCEDURE — 86901 BLOOD TYPING SEROLOGIC RH(D): CPT

## 2025-06-09 PROCEDURE — 93355 ECHO TRANSESOPHAGEAL (TEE): CPT

## 2025-06-09 PROCEDURE — B24BZZ4 ULTRASONOGRAPHY OF HEART WITH AORTA, TRANSESOPHAGEAL: ICD-10-PCS | Performed by: INTERNAL MEDICINE

## 2025-06-09 PROCEDURE — 2500000003 HC RX 250 WO HCPCS: Performed by: NURSE ANESTHETIST, CERTIFIED REGISTERED

## 2025-06-09 PROCEDURE — 93010 ELECTROCARDIOGRAM REPORT: CPT | Performed by: INTERNAL MEDICINE

## 2025-06-09 PROCEDURE — 1200000000 HC SEMI PRIVATE

## 2025-06-09 PROCEDURE — 6360000002 HC RX W HCPCS: Performed by: NURSE ANESTHETIST, CERTIFIED REGISTERED

## 2025-06-09 PROCEDURE — 5A1D70Z PERFORMANCE OF URINARY FILTRATION, INTERMITTENT, LESS THAN 6 HOURS PER DAY: ICD-10-PCS | Performed by: INTERNAL MEDICINE

## 2025-06-09 PROCEDURE — 2500000003 HC RX 250 WO HCPCS: Performed by: INTERNAL MEDICINE

## 2025-06-09 PROCEDURE — 6360000002 HC RX W HCPCS: Performed by: REGISTERED NURSE

## 2025-06-09 PROCEDURE — 2500000003 HC RX 250 WO HCPCS: Performed by: REGISTERED NURSE

## 2025-06-09 PROCEDURE — 7100000001 HC PACU RECOVERY - ADDTL 15 MIN: Performed by: INTERNAL MEDICINE

## 2025-06-09 PROCEDURE — 02L73DK OCCLUSION OF LEFT ATRIAL APPENDAGE WITH INTRALUMINAL DEVICE, PERCUTANEOUS APPROACH: ICD-10-PCS | Performed by: INTERNAL MEDICINE

## 2025-06-09 PROCEDURE — 6360000002 HC RX W HCPCS: Performed by: ANESTHESIOLOGY

## 2025-06-09 PROCEDURE — 6370000000 HC RX 637 (ALT 250 FOR IP): Performed by: INTERNAL MEDICINE

## 2025-06-09 PROCEDURE — 86900 BLOOD TYPING SEROLOGIC ABO: CPT

## 2025-06-09 PROCEDURE — 6360000004 HC RX CONTRAST MEDICATION: Performed by: INTERNAL MEDICINE

## 2025-06-09 PROCEDURE — 33340 PERQ CLSR TCAT L ATR APNDGE: CPT | Performed by: INTERNAL MEDICINE

## 2025-06-09 PROCEDURE — C1760 CLOSURE DEV, VASC: HCPCS | Performed by: INTERNAL MEDICINE

## 2025-06-09 PROCEDURE — 7100000000 HC PACU RECOVERY - FIRST 15 MIN: Performed by: INTERNAL MEDICINE

## 2025-06-09 PROCEDURE — 2580000003 HC RX 258: Performed by: REGISTERED NURSE

## 2025-06-09 PROCEDURE — 6360000002 HC RX W HCPCS: Performed by: INTERNAL MEDICINE

## 2025-06-09 PROCEDURE — 80048 BASIC METABOLIC PNL TOTAL CA: CPT

## 2025-06-09 DEVICE — LEFT ATRIAL APPENDAGE CLOSURE DEVICE WITH DELIVERY SYSTEM
Type: IMPLANTABLE DEVICE | Status: FUNCTIONAL
Brand: WATCHMAN FLX™ PRO

## 2025-06-09 RX ORDER — LIDOCAINE HYDROCHLORIDE 20 MG/ML
INJECTION, SOLUTION EPIDURAL; INFILTRATION; INTRACAUDAL; PERINEURAL
Status: DISCONTINUED | OUTPATIENT
Start: 2025-06-09 | End: 2025-06-09 | Stop reason: SDUPTHER

## 2025-06-09 RX ORDER — AMIODARONE HYDROCHLORIDE 200 MG/1
200 TABLET ORAL DAILY
Status: DISCONTINUED | OUTPATIENT
Start: 2025-06-09 | End: 2025-06-10 | Stop reason: HOSPADM

## 2025-06-09 RX ORDER — VANCOMYCIN HYDROCHLORIDE 1 G/20ML
INJECTION, POWDER, LYOPHILIZED, FOR SOLUTION INTRAVENOUS
Status: DISCONTINUED | OUTPATIENT
Start: 2025-06-09 | End: 2025-06-09 | Stop reason: SDUPTHER

## 2025-06-09 RX ORDER — SODIUM CHLORIDE 9 MG/ML
INJECTION, SOLUTION INTRAVENOUS
Status: DISCONTINUED | OUTPATIENT
Start: 2025-06-09 | End: 2025-06-09 | Stop reason: SDUPTHER

## 2025-06-09 RX ORDER — PROCHLORPERAZINE EDISYLATE 5 MG/ML
5 INJECTION INTRAMUSCULAR; INTRAVENOUS
Status: DISCONTINUED | OUTPATIENT
Start: 2025-06-09 | End: 2025-06-09 | Stop reason: HOSPADM

## 2025-06-09 RX ORDER — SODIUM CHLORIDE 9 MG/ML
INJECTION, SOLUTION INTRAVENOUS PRN
Status: DISCONTINUED | OUTPATIENT
Start: 2025-06-09 | End: 2025-06-10 | Stop reason: HOSPADM

## 2025-06-09 RX ORDER — SODIUM CHLORIDE 0.9 % (FLUSH) 0.9 %
5-40 SYRINGE (ML) INJECTION PRN
Status: DISCONTINUED | OUTPATIENT
Start: 2025-06-09 | End: 2025-06-10 | Stop reason: HOSPADM

## 2025-06-09 RX ORDER — OXYCODONE HYDROCHLORIDE 5 MG/1
10 TABLET ORAL PRN
Status: DISCONTINUED | OUTPATIENT
Start: 2025-06-09 | End: 2025-06-09 | Stop reason: HOSPADM

## 2025-06-09 RX ORDER — LABETALOL HYDROCHLORIDE 5 MG/ML
5 INJECTION, SOLUTION INTRAVENOUS EVERY 10 MIN PRN
Status: DISCONTINUED | OUTPATIENT
Start: 2025-06-09 | End: 2025-06-09 | Stop reason: HOSPADM

## 2025-06-09 RX ORDER — HYDRALAZINE HYDROCHLORIDE 20 MG/ML
5 INJECTION INTRAMUSCULAR; INTRAVENOUS ONCE
Status: COMPLETED | OUTPATIENT
Start: 2025-06-09 | End: 2025-06-09

## 2025-06-09 RX ORDER — NALOXONE HYDROCHLORIDE 0.4 MG/ML
INJECTION, SOLUTION INTRAMUSCULAR; INTRAVENOUS; SUBCUTANEOUS PRN
Status: DISCONTINUED | OUTPATIENT
Start: 2025-06-09 | End: 2025-06-09 | Stop reason: HOSPADM

## 2025-06-09 RX ORDER — SUCCINYLCHOLINE CHLORIDE 20 MG/ML
INJECTION INTRAMUSCULAR; INTRAVENOUS
Status: DISCONTINUED | OUTPATIENT
Start: 2025-06-09 | End: 2025-06-09 | Stop reason: SDUPTHER

## 2025-06-09 RX ORDER — PROTAMINE SULFATE 10 MG/ML
INJECTION, SOLUTION INTRAVENOUS
Status: DISCONTINUED | OUTPATIENT
Start: 2025-06-09 | End: 2025-06-09 | Stop reason: SDUPTHER

## 2025-06-09 RX ORDER — HEPARIN SODIUM 1000 [USP'U]/ML
INJECTION, SOLUTION INTRAVENOUS; SUBCUTANEOUS
Status: DISCONTINUED | OUTPATIENT
Start: 2025-06-09 | End: 2025-06-09 | Stop reason: SDUPTHER

## 2025-06-09 RX ORDER — IODIXANOL 320 MG/ML
INJECTION, SOLUTION INTRAVASCULAR PRN
Status: DISCONTINUED | OUTPATIENT
Start: 2025-06-09 | End: 2025-06-09 | Stop reason: HOSPADM

## 2025-06-09 RX ORDER — ONDANSETRON 2 MG/ML
INJECTION INTRAMUSCULAR; INTRAVENOUS
Status: DISCONTINUED | OUTPATIENT
Start: 2025-06-09 | End: 2025-06-09 | Stop reason: SDUPTHER

## 2025-06-09 RX ORDER — ACETAMINOPHEN 325 MG/1
650 TABLET ORAL EVERY 4 HOURS PRN
Status: DISCONTINUED | OUTPATIENT
Start: 2025-06-09 | End: 2025-06-10 | Stop reason: HOSPADM

## 2025-06-09 RX ORDER — SODIUM CHLORIDE 9 MG/ML
INJECTION, SOLUTION INTRAVENOUS PRN
Status: DISCONTINUED | OUTPATIENT
Start: 2025-06-09 | End: 2025-06-09 | Stop reason: HOSPADM

## 2025-06-09 RX ORDER — CARVEDILOL 25 MG/1
25 TABLET ORAL 2 TIMES DAILY WITH MEALS
Status: DISCONTINUED | OUTPATIENT
Start: 2025-06-09 | End: 2025-06-10 | Stop reason: HOSPADM

## 2025-06-09 RX ORDER — CLOPIDOGREL BISULFATE 75 MG/1
75 TABLET ORAL DAILY
Status: DISCONTINUED | OUTPATIENT
Start: 2025-06-10 | End: 2025-06-10 | Stop reason: HOSPADM

## 2025-06-09 RX ORDER — DEXAMETHASONE SODIUM PHOSPHATE 4 MG/ML
INJECTION, SOLUTION INTRA-ARTICULAR; INTRALESIONAL; INTRAMUSCULAR; INTRAVENOUS; SOFT TISSUE
Status: DISCONTINUED | OUTPATIENT
Start: 2025-06-09 | End: 2025-06-09 | Stop reason: SDUPTHER

## 2025-06-09 RX ORDER — ALLOPURINOL 300 MG/1
300 TABLET ORAL DAILY
Status: DISCONTINUED | OUTPATIENT
Start: 2025-06-09 | End: 2025-06-09

## 2025-06-09 RX ORDER — ROCURONIUM BROMIDE 10 MG/ML
INJECTION, SOLUTION INTRAVENOUS
Status: DISCONTINUED | OUTPATIENT
Start: 2025-06-09 | End: 2025-06-09 | Stop reason: SDUPTHER

## 2025-06-09 RX ORDER — FENTANYL CITRATE 50 UG/ML
INJECTION, SOLUTION INTRAMUSCULAR; INTRAVENOUS
Status: DISCONTINUED | OUTPATIENT
Start: 2025-06-09 | End: 2025-06-09 | Stop reason: SDUPTHER

## 2025-06-09 RX ORDER — SODIUM CHLORIDE 0.9 % (FLUSH) 0.9 %
5-40 SYRINGE (ML) INJECTION PRN
Status: DISCONTINUED | OUTPATIENT
Start: 2025-06-09 | End: 2025-06-09 | Stop reason: HOSPADM

## 2025-06-09 RX ORDER — LIDOCAINE HYDROCHLORIDE 10 MG/ML
INJECTION, SOLUTION INFILTRATION; PERINEURAL PRN
Status: DISCONTINUED | OUTPATIENT
Start: 2025-06-09 | End: 2025-06-09 | Stop reason: HOSPADM

## 2025-06-09 RX ORDER — ASPIRIN 81 MG/1
81 TABLET, CHEWABLE ORAL DAILY
Status: DISCONTINUED | OUTPATIENT
Start: 2025-06-10 | End: 2025-06-10 | Stop reason: HOSPADM

## 2025-06-09 RX ORDER — PROPOFOL 10 MG/ML
INJECTION, EMULSION INTRAVENOUS
Status: DISCONTINUED | OUTPATIENT
Start: 2025-06-09 | End: 2025-06-09 | Stop reason: SDUPTHER

## 2025-06-09 RX ORDER — SODIUM CHLORIDE 0.9 % (FLUSH) 0.9 %
5-40 SYRINGE (ML) INJECTION EVERY 12 HOURS SCHEDULED
Status: DISCONTINUED | OUTPATIENT
Start: 2025-06-09 | End: 2025-06-09 | Stop reason: HOSPADM

## 2025-06-09 RX ORDER — OXYCODONE HYDROCHLORIDE 5 MG/1
5 TABLET ORAL PRN
Status: DISCONTINUED | OUTPATIENT
Start: 2025-06-09 | End: 2025-06-09 | Stop reason: HOSPADM

## 2025-06-09 RX ORDER — DIPHENHYDRAMINE HYDROCHLORIDE 50 MG/ML
12.5 INJECTION, SOLUTION INTRAMUSCULAR; INTRAVENOUS
Status: DISCONTINUED | OUTPATIENT
Start: 2025-06-09 | End: 2025-06-09 | Stop reason: HOSPADM

## 2025-06-09 RX ORDER — SODIUM CHLORIDE 9 MG/ML
INJECTION, SOLUTION INTRAVENOUS CONTINUOUS
Status: DISCONTINUED | OUTPATIENT
Start: 2025-06-09 | End: 2025-06-09 | Stop reason: HOSPADM

## 2025-06-09 RX ORDER — SODIUM CHLORIDE 0.9 % (FLUSH) 0.9 %
5-40 SYRINGE (ML) INJECTION EVERY 12 HOURS SCHEDULED
Status: DISCONTINUED | OUTPATIENT
Start: 2025-06-09 | End: 2025-06-10 | Stop reason: HOSPADM

## 2025-06-09 RX ORDER — FENTANYL CITRATE 50 UG/ML
25 INJECTION, SOLUTION INTRAMUSCULAR; INTRAVENOUS EVERY 5 MIN PRN
Status: DISCONTINUED | OUTPATIENT
Start: 2025-06-09 | End: 2025-06-09 | Stop reason: HOSPADM

## 2025-06-09 RX ORDER — ONDANSETRON 2 MG/ML
4 INJECTION INTRAMUSCULAR; INTRAVENOUS
Status: DISCONTINUED | OUTPATIENT
Start: 2025-06-09 | End: 2025-06-09 | Stop reason: HOSPADM

## 2025-06-09 RX ADMIN — SUGAMMADEX 200 MG: 100 INJECTION, SOLUTION INTRAVENOUS at 11:25

## 2025-06-09 RX ADMIN — PHENYLEPHRINE HYDROCHLORIDE 100 MCG: 10 INJECTION INTRAVENOUS at 11:04

## 2025-06-09 RX ADMIN — PROTAMINE SULFATE 40 MG: 10 INJECTION, SOLUTION INTRAVENOUS at 11:41

## 2025-06-09 RX ADMIN — AMIODARONE HYDROCHLORIDE 200 MG: 200 TABLET ORAL at 16:51

## 2025-06-09 RX ADMIN — HEPARIN SODIUM 9000 UNITS: 1000 INJECTION INTRAVENOUS; SUBCUTANEOUS at 11:04

## 2025-06-09 RX ADMIN — PHENYLEPHRINE HYDROCHLORIDE 100 MCG: 10 INJECTION INTRAVENOUS at 10:57

## 2025-06-09 RX ADMIN — FENTANYL CITRATE 25 MCG: 50 INJECTION INTRAMUSCULAR; INTRAVENOUS at 13:56

## 2025-06-09 RX ADMIN — SUCCINYLCHOLINE CHLORIDE 120 MG: 20 INJECTION, SOLUTION INTRAMUSCULAR; INTRAVENOUS at 10:39

## 2025-06-09 RX ADMIN — HYDRALAZINE HYDROCHLORIDE 5 MG: 20 INJECTION INTRAMUSCULAR; INTRAVENOUS at 15:42

## 2025-06-09 RX ADMIN — SODIUM CHLORIDE: 0.9 INJECTION, SOLUTION INTRAVENOUS at 10:31

## 2025-06-09 RX ADMIN — HYDRALAZINE HYDROCHLORIDE 5 MG: 20 INJECTION INTRAMUSCULAR; INTRAVENOUS at 14:43

## 2025-06-09 RX ADMIN — PHENYLEPHRINE HYDROCHLORIDE 200 MCG: 10 INJECTION INTRAVENOUS at 10:46

## 2025-06-09 RX ADMIN — PHENYLEPHRINE HYDROCHLORIDE 100 MCG: 10 INJECTION INTRAVENOUS at 11:12

## 2025-06-09 RX ADMIN — HEPARIN SODIUM 5000 UNITS: 1000 INJECTION INTRAVENOUS; SUBCUTANEOUS at 11:12

## 2025-06-09 RX ADMIN — SODIUM CHLORIDE, PRESERVATIVE FREE 10 ML: 5 INJECTION INTRAVENOUS at 20:30

## 2025-06-09 RX ADMIN — ONDANSETRON 4 MG: 2 INJECTION INTRAMUSCULAR; INTRAVENOUS at 10:46

## 2025-06-09 RX ADMIN — DEXAMETHASONE SODIUM PHOSPHATE 4 MG: 4 INJECTION, SOLUTION INTRAMUSCULAR; INTRAVENOUS at 10:46

## 2025-06-09 RX ADMIN — CARVEDILOL 25 MG: 25 TABLET, FILM COATED ORAL at 16:51

## 2025-06-09 RX ADMIN — FENTANYL CITRATE 100 MCG: 50 INJECTION, SOLUTION INTRAMUSCULAR; INTRAVENOUS at 10:39

## 2025-06-09 RX ADMIN — LIDOCAINE HYDROCHLORIDE 100 MG: 20 INJECTION, SOLUTION EPIDURAL; INFILTRATION; INTRACAUDAL; PERINEURAL at 10:39

## 2025-06-09 RX ADMIN — VANCOMYCIN HYDROCHLORIDE 1000 MG: 1 INJECTION, POWDER, LYOPHILIZED, FOR SOLUTION INTRAVENOUS at 10:46

## 2025-06-09 RX ADMIN — ROCURONIUM BROMIDE 50 MG: 10 INJECTION, SOLUTION INTRAVENOUS at 10:46

## 2025-06-09 RX ADMIN — HEPARIN SODIUM 4000 UNITS: 1000 INJECTION INTRAVENOUS; SUBCUTANEOUS at 11:06

## 2025-06-09 RX ADMIN — PROPOFOL 150 MG: 10 INJECTION, EMULSION INTRAVENOUS at 10:39

## 2025-06-09 ASSESSMENT — PAIN SCALES - GENERAL: PAINLEVEL_OUTOF10: 0

## 2025-06-09 NOTE — DISCHARGE INSTRUCTIONS
Watchman Discharge Instruction    Care of your puncture site:  Remove bandage 24 hours after the procedure.  May shower in 24 hours but do not sit in a bathtub/pool of water for 5 days or until the wound is healed.  Gently clean groin using soap and water.  Dry thoroughly and apply a Band-Aid that covers the entire site. Use Band-Aid until skin heals over in about 3-5 days.   Do not apply powder or lotion.    Limit walking and stair climbing today.    Normal Observations:  Soreness or tenderness which may last one week.  Mild oozing from the incision site.  Possible bruising that could last 2 weeks.    Activity:  You may resume normal activity in 5 days or after the wound heals.  Avoid lifting more than 10 pounds for 5 days or until the wound heals.  Avoid strenuous exercise or activity for 1 week.  You may return to work in 1 week  without restrictions       Call your doctor immediately if your condition worsens, for any other concerns, for a follow-up appointment or if you experience any of the following:  Increased swelling on the groin or leg.  Unusual pain, numbness, or tingling of the groin or down the leg.  Any signs of infection such as: redness, yellow drainage at the site, swelling or pain.    IF GROIN STARTS BLEEDING SIGNIFICANTLY:   LAY FLAT, HOLD FIRM DIRECT PRESSURE, AND CALL 911     antibiotic prophylaxis (amoxicillin 2 g once 60 minutes prior to procedure) for 6 months for:  a.     Dental procedures including cleanings  b.     Gastrointestinal procedures  c.     Genitourinary procedures  d.     Respiratory procedures involving incision or biopsy  e.     Procedures on infected skin or muscle.

## 2025-06-09 NOTE — TELEPHONE ENCOUNTER
6/9/2025 s/p SUCCESSFUL WATCHMAN LAAC PROCEDURE PER DR. Immanuel Hernandes of the left atrial appendage occlusion device with no complication, WATCHMAN device used 40 mm size.     --6-month f/u Post Watchman.    NCDR window: 11/6/2025 to 2/4/2026  --1-year f/u Post Watchman.  NCDR window: 4/10/2026 to 8/8/2026  --2-year f/u Post Watchman.  NCDR window: 4/10/2027 to 8/8/2027    Cheryl, Schedule at [facility]  Post procedure RONNIE to be completed 45-59 days post procedure (7/24/2025-8/7/2025) Order placed.     Thanks.   Ac Foss RN, BSN   Watchman Coordinator

## 2025-06-09 NOTE — PROGRESS NOTES
Pt and report received from cath lab RN and CRNA. VSS. O2 sats 90s on RA. Pt awaken to voice. R groin puncture site w/ fem stop in place- 104 mmhg in balloon- to stay inflated until 1310. RLE NV assessment WNL- extremity warm, cap refill <3 seconds, pedal and post tib pulses palpable +1, and pt reports normal sensation. Pt resting comfortably in supine position. Will cont to monitor.

## 2025-06-09 NOTE — PROGRESS NOTES
VSS. O2 sats 90s on RA. Pt awake and alert. R groin site w/ figure 8 and fem stop in place until 1545. R groin CDI and soft to palpation. RLE NV assessment - normal sensation, cap refill <3 seconds, palpable pedal pulse +1, and dopplerable post tib pulse. Pt denies pain and nausea. Tolerating ice chips. Phase 1 criteria met. Will transfer to  when appropriate.

## 2025-06-09 NOTE — ANESTHESIA PRE PROCEDURE
Department of Anesthesiology  Preprocedure Note       Name:  Brandon Ramirez   Age:  75 y.o.  :  1950                                          MRN:  [id number]         Date:  2025      Surgeon: Surgeon(s):  Immanuel Hernandes MD Attari, Mehran, MD    Procedure: Procedure(s):  Watchman wesley closure device    Medications prior to admission:   Prior to Admission medications    Medication Sig Start Date End Date Taking? Authorizing Provider   carvedilol (COREG) 25 MG tablet TAKE ONE TABLET BY MOUTH TWICE A DAY WITH MEALS 25  Yes Misti Lopez APRN - CNP   isosorbide mononitrate (IMDUR) 30 MG extended release tablet TAKE ONE TABLET BY MOUTH DAILY 25  Yes Misti Lopez APRN - CNP   amiodarone (CORDARONE) 200 MG tablet Take one tablet by mouth daily 25  Yes Misti Lopez APRN - CNP   apixaban (ELIQUIS) 5 MG TABS tablet Take 1 tablet by mouth 2 times daily 25  Yes Misti Lopez APRN - CNP   levothyroxine (SYNTHROID) 137 MCG tablet Take 1 tablet by mouth Daily 24  Yes Rae Moreira MD   allopurinol (ZYLOPRIM) 300 MG tablet Take 1 tablet by mouth daily 10/15/18  Yes Surinder Reinoso MD   furosemide (LASIX) 80 MG tablet Take 1 tablet by mouth daily 25   Misti Lopez APRN - CNP   vitamin D (CHOLECALCIFEROL) 50 MCG (2000 UT) CAPS capsule Take 1 capsule by mouth daily  Patient not taking: Reported on 2025   ProviderRae MD       Current medications:    Current Facility-Administered Medications   Medication Dose Route Frequency Provider Last Rate Last Admin   • 0.9 % sodium chloride infusion   IntraVENous Continuous Immanuel Hernandes MD       • ceFAZolin (ANCEF) 2000 mg in 0.9% sodium chloride 50 mL IVPB  2,000 mg IntraVENous On Call to OR Immanuel Hernandes MD           Allergies:    Allergies   Allergen Reactions   • Peanut-Containing Drug Products Anaphylaxis   • Penicillins      AS CHILD UNSURE       Problem List:    Patient Active Problem List   Diagnosis Code   •

## 2025-06-09 NOTE — CONSULTS
[facility]  Consultation Note    Reason for Consult: ESRD management  Requesting Physician:  Dr. SIDRA Hernandes    CHIEF COMPLAINT: For left atrial appendage closure with Watchman device    History obtained from records and patient.    HISTORY OF PRESENT ILLNESS:                Brandon Ramirez  is 75 y.o. y.o. male with significant past medical history of ESRD, on HD MWF at [facility] under the care of of [facility], hypertension, atrial fibrillation, COPD, gout, hypertension, CHF, EF 30 to 35% who presents with left atrial appendage closure and watchman placement since needing to be off apixaban for renal transplant planning.  I am asked to see the patient with regards to his HD need.  Did not have HD today.  Last HD was Friday.  Potassium of 4.9 today with serum bicarbonate of 24.  Saturating well on room air.  Systolic blood pressure 120s to 140s.  Has right TDC for HD access.    Past Medical History:     has a past medical history of Abdominal hernia, Atrial fibrillation (HCC), CHF (congestive heart failure) (HCC), CKD (chronic kidney disease) stage 3, GFR 30-59 ml/min (HCC), COPD (chronic obstructive pulmonary disease) (HCC), Fracture, Gout, Hypertension, and Pacemaker.   Past Surgical History:     has a past surgical history that includes Cardiac catheterization (05/01/2004); Colonoscopy (05/31/2022); pacemaker placement; and Dialysis Catheter Insertion (N/A, 5/19/2023).   Current Medications:    Current Facility-Administered Medications: amiodarone (CORDARONE) tablet 200 mg, 200 mg, Oral, Daily  carvedilol (COREG) tablet 25 mg, 25 mg, Oral, BID with meals  [START ON 6/10/2025] levothyroxine (SYNTHROID) tablet 137 mcg, 137 mcg, Oral, QAM AC  sodium chloride flush 0.9 % injection 5-40 mL, 5-40 mL, IntraVENous, 2 times per day  sodium chloride flush 0.9 % injection 5-40 mL, 5-40 mL, IntraVENous, PRN  0.9 % sodium chloride infusion, , IntraVENous, PRN  acetaminophen (TYLENOL)

## 2025-06-09 NOTE — PROGRESS NOTES
Fem stop released at 1310 per order. Immediate bleeding noted at R groin site. Fem stop reinflated and R groin site bleeding stopped. Cath lab charge and Greta LYMAN notified. Concern for decreased perfusion in LLE noted w/ temp of LLE cooling and distal pulses changed from palpable to dopplerable only- post tibial pulse thready on doppler. Pt reports normal sensation. Greta LYMAN to bedside. Fem stop released again w/ rebleeding noted. Figure of 8 suture placed and fem stop replaced w/ no inflation in balloon acting as pressure dressing per Greta LYMAN. 25 mcg Fentanyl given for procedure - see MAR. Fem stop to remain in place for next \"couple hours\" and suture in place until tomorrow- order to be placed by Dr. Hernandes. No bleeding noted at this time. LLE NV assessment improved post procedure w/ palpable pedal pulse and strong dopplerable post tibial pulse. Pt reports normal sensation. Will cont to monitor.

## 2025-06-09 NOTE — H&P
Primary Cardiologist: Dr Rausch  Referring Physician: Misti dAkins APRN-CNP     Reason for Referral: Left atrial appendage closure     CC: \"I need to get off my blood thinner.\"        Subjective:      History of Present Illness:     Brandon Ramirez is a 75 y.o. patient with a PMH significant for chronic kidney disease, paroxsymal atrial fibrillation, cardiomyopathy s/p ICD 7/7/2020, hyperlipidemia, coronary artery disease and hypertension. He is in need of kidney transplant and needs to discontinue Eliquis which prompted this referral.      Patient is being referred for Left Atrial Appendage Closure with WATCHMAN device for management of stroke risk resulting from non-valvular atrial fibrillation. Based on their past history, it has been determined that they are poor candidates for long-term oral-anticoagulation, however may be tolerant of short term treatment with AC as necessary.       Today, he is here to discuss Watchman procedure. He is in need of a kidney transplant and needs to be off his Eliquis. Patient denies exertional chest pain/pressure, dyspnea at rest, MONSALVE, PND, orthopnea, palpitations, lightheadedness, weight changes, changes in LE edema, and syncope.      Past Medical History:   has a past medical history of Abdominal hernia, Atrial fibrillation (HCC), CHF (congestive heart failure) (HCC), CKD (chronic kidney disease) stage 3, GFR 30-59 ml/min (HCC), COPD (chronic obstructive pulmonary disease) (HCC), Fracture, Gout, Hypertension, and Pacemaker.     Surgical History:   has a past surgical history that includes Cardiac catheterization (05/01/2004); Colonoscopy (05/31/2022); pacemaker placement; and Dialysis Catheter Insertion (N/A, 5/19/2023).      Social History:   reports that he has never smoked. He has been exposed to tobacco smoke. He has never used smokeless tobacco. He reports current alcohol use. He reports that he does not currently use drugs.      Family History:  family history includes

## 2025-06-09 NOTE — PROGRESS NOTES
Patient admitted to room 5916 from PACU.  Oriented to room, call light, and floor policies.  Plan of care reviewed with patient/family.  Pt is resting in bed and not c/o pain at this time; no s/s of distress noted. Assessment completed; VSS. Tele in place reading AV paced rhythm. Pt rates a low fall risk; bed alarm placed.  Safety precautions in place; call light and bedside table within reach.  Pt encouraged to call for needs or ambulation.      4 Eyes Skin Assessment     The patient is being assess for  Admission    I agree that 2 RN's have performed a thorough Head to Toe Skin Assessment on the patient. ALL assessment sites listed below have been assessed.       Areas assessed by both nurses:   [x]   Head, Face, and Ears   [x]   Shoulders, Back, and Chest  [x]   Arms, Elbows, and Hands   [x]   Coccyx, Sacrum, and Ischum  [x]   Legs, Feet, and Heels        Does the Patient have Skin Breakdown?   Puncture site R groin          Layo Prevention initiated:  No   Wound Care Orders initiated:  No      [facility] nurse consulted for Pressure Injury (Stage 3,4, Unstageable, DTI, NWPT, and Complex wounds):  No      Nurse 1 eSignature: Electronically signed by Joshua Gibbs RN on 6/9/25 at 4:48 PM EDT    **SHARE this note so that the co-signing nurse is able to place an eSignature**    Nurse 2 eSignature: Electronically signed by Adeola Gustafson RN on 6/9/25 at 5:39 PM EDT

## 2025-06-09 NOTE — ANESTHESIA POSTPROCEDURE EVALUATION
Department of Anesthesiology  Postprocedure Note    Patient: Brandon Ramirez  MRN: [id number]  YOB: 1950  Date of evaluation: 6/9/2025    Procedure Summary       Date: 06/09/25 Room / Location: [facility] EP LAB 5 / [facility] CARDIAC CATH LAB    Anesthesia Start: 1031 Anesthesia Stop: 1222    Procedure: Watchman wesley closure device Diagnosis:       Paroxysmal atrial fibrillation (HCC)      (Paroxysmal atrial fibrillation (HCC) [I48.0])    Providers: Immanuel Hernandes MD Responsible Provider: Omar Cortes DO    Anesthesia Type: general ASA Status: 4            Anesthesia Type: No value filed.    Janeth Phase I: Janeth Score: 10    Janeth Phase II:      Anesthesia Post Evaluation    Patient location during evaluation: PACU  Patient participation: complete - patient participated  Level of consciousness: awake  Airway patency: patent  Nausea & Vomiting: no nausea  Cardiovascular status: hemodynamically stable  Respiratory status: acceptable  Hydration status: euvolemic  Multimodal analgesia pain management approach  Pain management: adequate    There were no known notable events for this encounter.

## 2025-06-09 NOTE — PROGRESS NOTES
Fem stop has remained in place for 2 hours per order. R groin site unchanged from previous assessment. Fem stop released at 1545 w/ no rebleeding noted. R groin site soft to palpation. Figure 8 stitch in place. Antimicrobial dressing placed over suture per verbal order from Greta LYMAN. R pedal pulse palpable and post tib pulse dopplerable. Pt reports normal sensation. Cap refill <3 seconds. Will cont to monitor.

## 2025-06-10 VITALS
HEART RATE: 82 BPM | SYSTOLIC BLOOD PRESSURE: 109 MMHG | HEIGHT: 74 IN | OXYGEN SATURATION: 98 % | WEIGHT: 208.78 LBS | BODY MASS INDEX: 26.79 KG/M2 | TEMPERATURE: 97.2 F | RESPIRATION RATE: 16 BRPM | DIASTOLIC BLOOD PRESSURE: 82 MMHG

## 2025-06-10 LAB
ANION GAP SERPL CALCULATED.3IONS-SCNC: 14 MMOL/L (ref 3–16)
ANION GAP SERPL CALCULATED.3IONS-SCNC: 15 MMOL/L (ref 3–16)
BUN SERPL-MCNC: 24 MG/DL (ref 7–20)
BUN SERPL-MCNC: 42 MG/DL (ref 7–20)
CALCIUM SERPL-MCNC: 9.4 MG/DL (ref 8.3–10.6)
CALCIUM SERPL-MCNC: 9.5 MG/DL (ref 8.3–10.6)
CHLORIDE SERPL-SCNC: 103 MMOL/L (ref 99–110)
CHLORIDE SERPL-SCNC: 105 MMOL/L (ref 99–110)
CO2 SERPL-SCNC: 16 MMOL/L (ref 21–32)
CO2 SERPL-SCNC: 22 MMOL/L (ref 21–32)
CREAT SERPL-MCNC: 6 MG/DL (ref 0.8–1.3)
CREAT SERPL-MCNC: 8.7 MG/DL (ref 0.8–1.3)
DEPRECATED RDW RBC AUTO: 15.3 % (ref 12.4–15.4)
DEPRECATED RDW RBC AUTO: 15.3 % (ref 12.4–15.4)
EKG ATRIAL RATE: 78 BPM
EKG DIAGNOSIS: NORMAL
EKG Q-T INTERVAL: 476 MS
EKG QRS DURATION: 162 MS
EKG QTC CALCULATION (BAZETT): 548 MS
EKG R AXIS: 99 DEGREES
EKG T AXIS: 125 DEGREES
EKG VENTRICULAR RATE: 80 BPM
GFR SERPLBLD CREATININE-BSD FMLA CKD-EPI: 6 ML/MIN/{1.73_M2}
GFR SERPLBLD CREATININE-BSD FMLA CKD-EPI: 9 ML/MIN/{1.73_M2}
GLUCOSE SERPL-MCNC: 80 MG/DL (ref 70–99)
GLUCOSE SERPL-MCNC: 82 MG/DL (ref 70–99)
HBV SURFACE AB SERPL IA-ACNC: <3.5 MIU/ML
HBV SURFACE AG SERPL QL IA: REACTIVE
HCT VFR BLD AUTO: 31.6 % (ref 40.5–52.5)
HCT VFR BLD AUTO: 35.3 % (ref 40.5–52.5)
HGB BLD-MCNC: 10.8 G/DL (ref 13.5–17.5)
HGB BLD-MCNC: 11.9 G/DL (ref 13.5–17.5)
MCH RBC QN AUTO: 31.3 PG (ref 26–34)
MCH RBC QN AUTO: 31.6 PG (ref 26–34)
MCHC RBC AUTO-ENTMCNC: 33.6 G/DL (ref 31–36)
MCHC RBC AUTO-ENTMCNC: 34.1 G/DL (ref 31–36)
MCV RBC AUTO: 91.8 FL (ref 80–100)
MCV RBC AUTO: 94.2 FL (ref 80–100)
PLATELET # BLD AUTO: 115 K/UL (ref 135–450)
PLATELET # BLD AUTO: 120 K/UL (ref 135–450)
PMV BLD AUTO: 7.9 FL (ref 5–10.5)
PMV BLD AUTO: 8.2 FL (ref 5–10.5)
POTASSIUM SERPL-SCNC: 4.3 MMOL/L (ref 3.5–5.1)
POTASSIUM SERPL-SCNC: 5.3 MMOL/L (ref 3.5–5.1)
RBC # BLD AUTO: 3.44 M/UL (ref 4.2–5.9)
RBC # BLD AUTO: 3.75 M/UL (ref 4.2–5.9)
REASON FOR REJECTION: NORMAL
REJECTED TEST: NORMAL
SODIUM SERPL-SCNC: 136 MMOL/L (ref 136–145)
SODIUM SERPL-SCNC: 139 MMOL/L (ref 136–145)
WBC # BLD AUTO: 5.8 K/UL (ref 4–11)
WBC # BLD AUTO: 6 K/UL (ref 4–11)

## 2025-06-10 PROCEDURE — 93005 ELECTROCARDIOGRAM TRACING: CPT | Performed by: INTERNAL MEDICINE

## 2025-06-10 PROCEDURE — 93005 ELECTROCARDIOGRAM TRACING: CPT | Performed by: NURSE PRACTITIONER

## 2025-06-10 PROCEDURE — 86706 HEP B SURFACE ANTIBODY: CPT

## 2025-06-10 PROCEDURE — 80048 BASIC METABOLIC PNL TOTAL CA: CPT

## 2025-06-10 PROCEDURE — 93010 ELECTROCARDIOGRAM REPORT: CPT | Performed by: INTERNAL MEDICINE

## 2025-06-10 PROCEDURE — 87340 HEPATITIS B SURFACE AG IA: CPT

## 2025-06-10 PROCEDURE — 6370000000 HC RX 637 (ALT 250 FOR IP): Performed by: INTERNAL MEDICINE

## 2025-06-10 PROCEDURE — 99239 HOSP IP/OBS DSCHRG MGMT >30: CPT | Performed by: NURSE PRACTITIONER

## 2025-06-10 PROCEDURE — 90935 HEMODIALYSIS ONE EVALUATION: CPT

## 2025-06-10 PROCEDURE — 36415 COLL VENOUS BLD VENIPUNCTURE: CPT

## 2025-06-10 PROCEDURE — 85027 COMPLETE CBC AUTOMATED: CPT

## 2025-06-10 PROCEDURE — 94760 N-INVAS EAR/PLS OXIMETRY 1: CPT

## 2025-06-10 PROCEDURE — 2500000003 HC RX 250 WO HCPCS: Performed by: INTERNAL MEDICINE

## 2025-06-10 RX ORDER — ASPIRIN 81 MG/1
81 TABLET, CHEWABLE ORAL DAILY
Qty: 90 TABLET | Refills: 3 | Status: SHIPPED | OUTPATIENT
Start: 2025-06-10

## 2025-06-10 RX ORDER — FUROSEMIDE 80 MG/1
80 TABLET ORAL DAILY
Qty: 90 TABLET | Refills: 2 | Status: SHIPPED | OUTPATIENT
Start: 2025-06-17 | End: 2025-06-10

## 2025-06-10 RX ORDER — CARVEDILOL 12.5 MG/1
12.5 TABLET ORAL 2 TIMES DAILY
Qty: 60 TABLET | Refills: 2 | Status: SHIPPED | OUTPATIENT
Start: 2025-06-10

## 2025-06-10 RX ORDER — CLOPIDOGREL BISULFATE 75 MG/1
75 TABLET ORAL DAILY
Qty: 90 TABLET | Refills: 1 | Status: SHIPPED | OUTPATIENT
Start: 2025-06-10

## 2025-06-10 RX ORDER — FUROSEMIDE 80 MG/1
80 TABLET ORAL DAILY
Qty: 90 TABLET | Refills: 2 | Status: SHIPPED | OUTPATIENT
Start: 2025-06-13

## 2025-06-10 RX ADMIN — CLOPIDOGREL BISULFATE 75 MG: 75 TABLET, FILM COATED ORAL at 11:48

## 2025-06-10 RX ADMIN — LEVOTHYROXINE SODIUM 137 MCG: 0.11 TABLET ORAL at 05:35

## 2025-06-10 RX ADMIN — SODIUM CHLORIDE, PRESERVATIVE FREE 10 ML: 5 INJECTION INTRAVENOUS at 11:49

## 2025-06-10 RX ADMIN — ASPIRIN 81 MG: 81 TABLET, CHEWABLE ORAL at 11:48

## 2025-06-10 ASSESSMENT — ENCOUNTER SYMPTOMS
CONSTIPATION: 0
COUGH: 0
SHORTNESS OF BREATH: 0
EYE REDNESS: 0
EYE DISCHARGE: 0
BLOOD IN STOOL: 0
VOMITING: 0
CHEST TIGHTNESS: 0
FACIAL SWELLING: 0
NAUSEA: 0
ABDOMINAL DISTENTION: 0
PHOTOPHOBIA: 0
ABDOMINAL PAIN: 0
DIARRHEA: 0

## 2025-06-10 NOTE — PROGRESS NOTES
Data- discharge order received, pt verbalized agreement to discharge, disposition to previous residence, no needs for HHC/DME.     Action- discharge instructions prepared and given to pt and spouse, pt verbalized understanding. Medication information packet given r/t NEW and/or CHANGED prescriptions emphasizing name/purpose/side effects, pt verbalized understanding. Discharge instruction summary: Diet- regular, Activity- up as tolerated, Primary Care Physician as follows: Moses Zurita [phone] f/u appointment in a week, immunizations reviewed and updated, prescription medications filled at pharmacy of choice. Inpatient surgical procedure precautions reviewed: Watchman CHF Education reviewed. Pt/ Family has had a total of 60 minutes CHF education this admission encounter.     1. WEIGHT: Admit Weight - Scale: 93.9 kg (207 lb) (06/09/25 0818)        Today  Weight - Scale: 94.7 kg (208 lb 12.4 oz) (06/10/25 0730)       2. O2 SAT.: SpO2: 98 % (06/10/25 0725)    Response- Pt belongings gathered, IV removed. Disposition is home (no HHC/DME needs), transported with belongings and discharge instructions, taken to lobby via w/c w/ RN, no complications.

## 2025-06-10 NOTE — PROGRESS NOTES
[facility] Note    Patient Active Problem List   Diagnosis    Gout    A-fib (HCC)    Hypertension    Presence of cardiac resynchronization therapy defibrillator (CRT-D)    Cardiomyopathy (HCC)    CKD (chronic kidney disease)    Long term current use of anticoagulant    Monoclonal gammopathy    Exertional chest pain    BELL (acute kidney injury)    Hyperkalemia    H/O angiography    End stage renal disease (HCC)    Obesity (BMI 30.0-34.9)    Incarcerated ventral hernia    AF (paroxysmal atrial fibrillation) (HCC)       Past Medical History:   has a past medical history of Abdominal hernia, Atrial fibrillation (HCC), CHF (congestive heart failure) (HCC), CKD (chronic kidney disease) stage 3, GFR 30-59 ml/min (HCC), COPD (chronic obstructive pulmonary disease) (HCC), Fracture, Gout, Hypertension, and Pacemaker.    Past Social History:   reports that he has never smoked. He has been exposed to tobacco smoke. He has never used smokeless tobacco. He reports current alcohol use. He reports that he does not currently use drugs.    Subjective:    Seen on HD, tolerating well.  2.5 L removed.  Review of Systems   Constitutional:  Negative for activity change, appetite change, chills, fatigue, fever and unexpected weight change.   HENT:  Negative for congestion and facial swelling.    Eyes:  Negative for photophobia, discharge and redness.   Respiratory:  Negative for cough, chest tightness and shortness of breath.    Cardiovascular:  Negative for chest pain, palpitations and leg swelling.   Gastrointestinal:  Negative for abdominal distention, abdominal pain, blood in stool, constipation, diarrhea, nausea and vomiting.   Endocrine: Negative for cold intolerance, heat intolerance and polyuria.   Genitourinary:  Negative for decreased urine volume, difficulty urinating, flank pain and hematuria.   Musculoskeletal:  Negative for joint swelling and neck pain.   Neurological:  Negative for

## 2025-06-10 NOTE — DISCHARGE SUMMARY
tablet  Commonly known as: COREG  TAKE ONE TABLET BY MOUTH TWICE A DAY WITH MEALS     furosemide 80 MG tablet  Commonly known as: LASIX  Take 1 tablet by mouth daily     isosorbide mononitrate 30 MG extended release tablet  Commonly known as: IMDUR  TAKE ONE TABLET BY MOUTH DAILY     levothyroxine 137 MCG tablet  Commonly known as: SYNTHROID            STOP taking these medications      apixaban 5 MG Tabs tablet  Commonly known as: Eliquis            ASK your doctor about these medications      allopurinol 300 MG tablet  Commonly known as: ZYLOPRIM  Take 1 tablet by mouth daily     vitamin D 50 MCG (2000 UT) Caps capsule  Commonly known as: CHOLECALCIFEROL               Where to Get Your Medications        These medications were sent to [address] - P [phone] - F [phone]  [address] [place] [id number]      Phone: [phone]   aspirin 81 MG chewable tablet  clopidogrel 75 MG tablet       Time spent on discharge of patient: >31 minutes, including plan of care, patient education, and care coordination.       Signed:  JOSE Chavez CNP, 6/10/2025, 12:08 PM    NOTE:  This report was transcribed using voice recognition software.  Every effort was made to ensure accuracy; however, inadvertent computerized transcription errors may be present.

## 2025-06-10 NOTE — PROGRESS NOTES
CLINICAL PHARMACY NOTE: MEDS TO BEDS    Total # of Prescriptions Filled: 2   The following medications were delivered to the patient:  CLOPIDOGREL BISULFATE 75MG  ASPIRIN 81MG    Additional Documentation:  Delivered to patients room   Ok to be delivered per RN Florecita Cohen CPhT

## 2025-06-10 NOTE — PROGRESS NOTES
Notified by nursing that pt was being wheeled out for discharge and slumped over in chair and passed out. Pt said to me he just lost his balance. Stat CBC and BMP stable. Groin c/d/I without complication. EKG paced. UF today with net -2.5L. Orthostatic /82 laying, 75/65 sitting. Pt denies symptoms to me. No focal defects. States earlier he was up walking in room and tolerating well.   Pt is very frustrated and adamant about leaving. Will cut coreg in half to 12.5mg BID. Stop imdur. Lasix daily, ok to resume Friday. Close follow up in office.    Kirsten Ortega, JOSE - CNP  Discussed plan with Dr. Hernandes

## 2025-06-10 NOTE — PROGRESS NOTES
Treatment time: 3 Hours and 30 minutes    Net UF: 2.5 liters Doctor Luz Maria was aware of it    Pre weight: 94.7 kg  Post weight: 92.2 kg    Access used: Right CVC  Access function: Access site had clean dry and intact CVC dressing. NO signs of infection noted. No redness, hematoma nor swelling was observed. No discharges was seen. Both ports had good flow. Cleaned site and changed dressing aseptically.    Medications or blood products given: heparin dwell    Regular outpatient schedule: M,W,F    Summary of response to treatment: 07:40: Treatment set at 2.5 liters for 3 hours and 30 minutes via Right CVC. Access site had clean dry and intact CVC dressing. NO signs of infection noted. No redness, hematoma nor swelling was observed. No discharges was seen. Both ports had good flow. Cleaned site and changed dressing aseptically. Parameters set accordingly. Hooked to HD machine. Monitored from time to time. 11:10: Treatment completed Returned blood aseptically. Hd tolerated well.     Copy of dialysis treatment record placed in chart, to be scanned into EMR.

## 2025-06-11 LAB
EKG ATRIAL RATE: 293 BPM
EKG DIAGNOSIS: NORMAL
EKG Q-T INTERVAL: 466 MS
EKG QRS DURATION: 148 MS
EKG QTC CALCULATION (BAZETT): 537 MS
EKG R AXIS: 99 DEGREES
EKG T AXIS: 131 DEGREES
EKG VENTRICULAR RATE: 80 BPM

## 2025-06-11 PROCEDURE — 93010 ELECTROCARDIOGRAM REPORT: CPT | Performed by: INTERNAL MEDICINE

## 2025-06-11 NOTE — TELEPHONE ENCOUNTER
Mireya pt advised that he needs a Tues or Thurs - he has dialysis on M,W,F of the week.  Can you provide another date?  Thank you.

## 2025-06-20 NOTE — TELEPHONE ENCOUNTER
Spoke with wife Purvi & pt - [facility]     Instructions for your RONNIE:  Arrive at [facility] through the main entrance.  Check in at the Outpatient Diagnostic desk on the 1st floor.  Do not eat or drink anything after midnight the night before the procedure.    You may brush your teeth and rinse the morning of the procedure.  Take ALL your routine medications the morning of the procedure  Do NOT stop taking aspirin, Plavix, coumadin, Eliquis, Xarelto, or Pradaxa (any blood thinners)  Please arrive 1.5 hours prior to the procedure time.  Please bring a list of your medications to the hospital with you.  Do not apply any lotion, powder, or deodorant the morning of the procedure.  You must have someone available to drive you home.  It is recommended that you do not drive for 24 hours after the procedure.  If you are unable to make this appointment, please call Sophie at [phone].        Date of Procedure: 7-31-25    Time of Arrival: 7:30 am    Time of Procedure: 9:00 am    Cardiologist performing procedure: Dr. Bowers      6 month / 12-4-25/10:30 am w/JOANN @ [facility]    1 year - recall set    2 year - recall set

## 2025-07-08 NOTE — PROGRESS NOTES
[facility]   Electrophysiology  Office Visit  Date: 7/8/2025    No chief complaint on file.      HPI/Cardiac HX: Brandon Ramirez is a 75 y.o. male with PMH significant for HTN, HLD, COPD, ESRD on HD, PAF on amiodarone, NICMP, EF 30%, s/p SJM CRT-D with surgical implant of epicardial LV lead (12/2010), EF improved to 50-55% (2016), s/p gen change (7/2020, Dr. Batres), s/p LHC (5/2019, Dr. Rausch) showed mild CAD, echo (1/2024) showed EF 30-35%. In need of a kidney transplant and needs to discontinue Eliquis. S/p Watchman device (6/9/25, Dr. Hernandes)     Per device, AF has been persistent since 6/2024. Awaiting second opinion with Dr. Bowers for a Watchman device with Dr. Carty so he can come off OAC to be placed on kidney transplant list.     [id number] 3. TSH 1.7 (2/3/2025).       Interval History:  Patient is here for follow-up for persistent AF, and ICMP, HFrEF, CRT-D management.  Today he presents in V paced with PVCs.  He denies palpitations heart racing or irregular heartbeat  He remains on amiodarone 200 mg daily, Qtc 495.  He is status post Watchman.  He is on aspirin and Plavix.  His blood pressure is stable at 102/80 in the office today.  He denies lightheaded or dizziness.  Patient denies chest pain, SOB, palpitations/racing heart rate, LH/dizziness, orthopnea/PND or LE Edema.  Amio screening labs, LFTs/PFT ordered last office visit.  this has not been done yet.  Last TSH 1.7 on 2/3/2025.    Review of his device today shows normal functioning CRT-D with stable sensing and pacing thresholds. Estimated ALMA < 3 months, 99% AF burden. Device shows multiple runs of NSVT. It appears to be uncontrolled AF with V sensing. His BP has decreased to 83% which may be due to increased V rates.  Patient reportedly stopped taking carvedilol around the beginning of June due to hypotension during dialysis.  This would explain his increased heart rates.  He does take 1 midodrine 10 mg prior to

## 2025-07-10 ENCOUNTER — OFFICE VISIT (OUTPATIENT)
Dept: CARDIOLOGY CLINIC | Age: 75
End: 2025-07-10
Payer: MEDICARE

## 2025-07-10 VITALS
DIASTOLIC BLOOD PRESSURE: 80 MMHG | SYSTOLIC BLOOD PRESSURE: 102 MMHG | BODY MASS INDEX: 26.63 KG/M2 | HEART RATE: 85 BPM | WEIGHT: 207.4 LBS

## 2025-07-10 DIAGNOSIS — I10 PRIMARY HYPERTENSION: ICD-10-CM

## 2025-07-10 DIAGNOSIS — Z95.810 PRESENCE OF CARDIAC RESYNCHRONIZATION THERAPY DEFIBRILLATOR (CRT-D): Primary | ICD-10-CM

## 2025-07-10 DIAGNOSIS — Z79.01 LONG TERM CURRENT USE OF ANTICOAGULANT: ICD-10-CM

## 2025-07-10 DIAGNOSIS — I50.20 HFREF (HEART FAILURE WITH REDUCED EJECTION FRACTION) (HCC): ICD-10-CM

## 2025-07-10 DIAGNOSIS — I42.8 NICM (NONISCHEMIC CARDIOMYOPATHY) (HCC): ICD-10-CM

## 2025-07-10 DIAGNOSIS — Z79.899 LONG TERM CURRENT USE OF AMIODARONE: ICD-10-CM

## 2025-07-10 DIAGNOSIS — Z95.818 PRESENCE OF WATCHMAN LEFT ATRIAL APPENDAGE CLOSURE DEVICE: ICD-10-CM

## 2025-07-10 DIAGNOSIS — I48.0 AF (PAROXYSMAL ATRIAL FIBRILLATION) (HCC): ICD-10-CM

## 2025-07-10 PROCEDURE — 93000 ELECTROCARDIOGRAM COMPLETE: CPT | Performed by: NURSE PRACTITIONER

## 2025-07-10 PROCEDURE — 1124F ACP DISCUSS-NO DSCNMKR DOCD: CPT | Performed by: NURSE PRACTITIONER

## 2025-07-10 PROCEDURE — 3079F DIAST BP 80-89 MM HG: CPT | Performed by: NURSE PRACTITIONER

## 2025-07-10 PROCEDURE — 1159F MED LIST DOCD IN RCRD: CPT | Performed by: NURSE PRACTITIONER

## 2025-07-10 PROCEDURE — 99215 OFFICE O/P EST HI 40 MIN: CPT | Performed by: NURSE PRACTITIONER

## 2025-07-10 PROCEDURE — 1160F RVW MEDS BY RX/DR IN RCRD: CPT | Performed by: NURSE PRACTITIONER

## 2025-07-10 PROCEDURE — 3074F SYST BP LT 130 MM HG: CPT | Performed by: NURSE PRACTITIONER

## 2025-07-28 ENCOUNTER — TELEPHONE (OUTPATIENT)
Dept: CARDIOLOGY CLINIC | Age: 75
End: 2025-07-28

## 2025-07-28 DIAGNOSIS — I50.20 HFREF (HEART FAILURE WITH REDUCED EJECTION FRACTION) (HCC): ICD-10-CM

## 2025-07-28 DIAGNOSIS — I42.8 NICM (NONISCHEMIC CARDIOMYOPATHY) (HCC): Primary | ICD-10-CM

## 2025-07-30 NOTE — PRE-PROCEDURE INSTRUCTIONS
Attempted to call patient about procedure. No answer. Voicemail left. Told to be here at 0800 for procedure at 0930. NPO after midnight, but can take morning medication with sips of water. Office should have told them when and if they should stop any blood thinners. Told to have a responsible adult be with the patient to take them home and stay with them afterwards, if they are not admitted to hospital afterwards. And if available bring current list of medications.

## 2025-07-31 ENCOUNTER — ANESTHESIA (OUTPATIENT)
Dept: INTERVENTIONAL RADIOLOGY/VASCULAR | Age: 75
End: 2025-07-31
Payer: MEDICAID

## 2025-07-31 ENCOUNTER — ANESTHESIA EVENT (OUTPATIENT)
Dept: INTERVENTIONAL RADIOLOGY/VASCULAR | Age: 75
End: 2025-07-31
Payer: MEDICAID

## 2025-07-31 ENCOUNTER — HOSPITAL ENCOUNTER (OUTPATIENT)
Dept: INTERVENTIONAL RADIOLOGY/VASCULAR | Age: 75
Discharge: HOME | End: 2025-08-02
Attending: INTERNAL MEDICINE
Payer: MEDICAID

## 2025-07-31 VITALS
TEMPERATURE: 97.4 F | SYSTOLIC BLOOD PRESSURE: 174 MMHG | WEIGHT: 204 LBS | BODY MASS INDEX: 27.04 KG/M2 | DIASTOLIC BLOOD PRESSURE: 126 MMHG | RESPIRATION RATE: 12 BRPM | HEIGHT: 73 IN | HEART RATE: 80 BPM | OXYGEN SATURATION: 100 %

## 2025-07-31 DIAGNOSIS — I48.0 PAROXYSMAL A-FIB (HCC): ICD-10-CM

## 2025-07-31 DIAGNOSIS — Z95.818 PRESENCE OF WATCHMAN LEFT ATRIAL APPENDAGE CLOSURE DEVICE: ICD-10-CM

## 2025-07-31 LAB — ECHO BSA: 2.18 M2

## 2025-07-31 PROCEDURE — 2580000003 HC RX 258

## 2025-07-31 PROCEDURE — 6360000002 HC RX W HCPCS

## 2025-07-31 PROCEDURE — 7100000011 HC PHASE II RECOVERY - ADDTL 15 MIN: Performed by: INTERNAL MEDICINE

## 2025-07-31 PROCEDURE — 3700000000 HC ANESTHESIA ATTENDED CARE: Performed by: INTERNAL MEDICINE

## 2025-07-31 PROCEDURE — 7100000010 HC PHASE II RECOVERY - FIRST 15 MIN: Performed by: INTERNAL MEDICINE

## 2025-07-31 PROCEDURE — 93312 ECHO TRANSESOPHAGEAL: CPT

## 2025-07-31 RX ORDER — LIDOCAINE HYDROCHLORIDE 20 MG/ML
INJECTION, SOLUTION INFILTRATION; PERINEURAL
Status: DISCONTINUED | OUTPATIENT
Start: 2025-07-31 | End: 2025-07-31 | Stop reason: SDUPTHER

## 2025-07-31 RX ORDER — PROPOFOL 10 MG/ML
INJECTION, EMULSION INTRAVENOUS
Status: DISCONTINUED | OUTPATIENT
Start: 2025-07-31 | End: 2025-07-31 | Stop reason: SDUPTHER

## 2025-07-31 RX ORDER — SODIUM CHLORIDE 9 MG/ML
INJECTION, SOLUTION INTRAVENOUS
Status: DISCONTINUED | OUTPATIENT
Start: 2025-07-31 | End: 2025-07-31 | Stop reason: SDUPTHER

## 2025-07-31 RX ADMIN — LIDOCAINE HYDROCHLORIDE 100 MG: 20 INJECTION, SOLUTION INFILTRATION; PERINEURAL at 09:39

## 2025-07-31 RX ADMIN — PHENYLEPHRINE HYDROCHLORIDE 100 MCG: 10 INJECTION, SOLUTION INTRAVENOUS at 09:39

## 2025-07-31 RX ADMIN — PROPOFOL 50 MG: 10 INJECTION, EMULSION INTRAVENOUS at 09:39

## 2025-07-31 RX ADMIN — SODIUM CHLORIDE: 9 INJECTION, SOLUTION INTRAVENOUS at 09:35

## 2025-07-31 NOTE — DISCHARGE INSTRUCTIONS
[facility]  Cardiovascular Special Procedures  Transesophageal Echocardiogram  Patient Discharge Instructions      Rest for the remainder of the day.    Your throat may be sore, this is common and will go away. If persistent soreness continues, call your physician.    If you have any difficulty in swallowing or drinking/eating, call your physician.    Resume normal activity tomorrow.        [facility]  Cardiovascular Special Procedures  General Discharge Instructions    PROCEDURE: Transesophageal Echocardiogram    _x__ You may be drowsy or lightheaded after receiving sedation. DO NOT operate a vehicle (automobile, bicycle, motorcycle, machinery, or power tools), make any important decisions or sign any important/legal documents, or drink alcoholic beverages for the next 24 hours  _x__ We strongly suggest that a responsible adult be with you for the next 24 hours for your protection and safety  _x__ If the intravenous catheter site is painful, apply warm wet compresses on the site until the soreness is relieved and elevate the arm above the heart.  Call your physician if no improvement in 2 to 3 days    DIETARY INSTRUCTIONS:    ____ Drink extra fluids over the next 24 hours (If not contraindicated by illness or by physician order)  ____ Start with clear liquids and progress to normal diet as you feel like eating.  If you experience nausea or repeated episodes of vomiting, which persist beyond 12-24 hours, notify your doctor        _x__ Resume your previous diet      MEDICATION INSTRUCTIONS:    _x__ See Medication Reconciliation Sheet        FOLLOW-UP APPOINTMENT    Follow up with MD as directed.    Belongings returned to patient and/or family: Yes.    The Discharge Instructions have been explained to me. I understand and can verbalize these instructions.

## 2025-07-31 NOTE — PROGRESS NOTES
PRE RONNIE SEDATION     Pre procedural Sedation Assessment Note - RONNIE    Pre - procedure Diagnosis:  Post watchman    Planned procedure:  RONNIE    H&P reviewed   Brandon Ramirez is a 75 y.o. male with a past medical hx of AF, CHF who presents post watchman.   Will proceed with RONNIE.     Nursing history and assessment - reviewed    Allergies:  Allergies   Allergen Reactions    Peanut-Containing Drug Products Anaphylaxis    Penicillins      AS CHILD UNSURE       PMH:  Past Medical History:   Diagnosis Date    Abdominal hernia     Atrial fibrillation ([facility])     CHF (congestive heart failure) ([facility])     Dr. Rausch(cardiologist)    CKD (chronic kidney disease) stage 3, GFR 30-59 ml/min ([facility])     COPD (chronic obstructive pulmonary disease) ([facility])     Fracture     R index finger and L ankle    Gout 2000    Hnands/feet/elbows    Hypertension     Pacemaker        Outpatient Meds:  Not in a hospital admission.    Inpatient Medications:      IV drips:      PRN:      Physical Examination   Vitals:    07/31/25 0917   Temp: 97.4 °F (36.3 °C)     General appearance - AAOX3  Chest - clear to auscultation  Heart - Regular heart sounds  Neck - No JVD    Airway assessment - Mallampati class - 3*    ASA classification - 3*    Labs:  No results for input(s): \"NA\", \"K\", \"BUN\", \"CREATININE\", \"CL\", \"CO2\", \"GLUCOSE\", \"CALCIUM\", \"MG\" in the last 72 hours.  No results for input(s): \"WBC\", \"HGB\", \"HCT\", \"PLT\", \"MCV\" in the last 72 hours.  No results for input(s): \"CHOLTOT\", \"TRIG\", \"HDL\", \"CHOLHDL\", \"LDL\" in the last 72 hours.    Invalid input(s): \"LIPIDCOMM\", \"VLDCHOL\"  No results for input(s): \"INR\" in the last 72 hours.    Invalid input(s): \"PT\", \"PTT\"  No results for input(s): \"CKTOTAL\", \"CKMB\", \"CKMBINDEX\", \"TROPONINI\" in the last 72 hours.  No results for input(s): \"BNP\" in the last 72 hours.  No results for input(s): \"NTPROBNP\" in the last 72 hours.  No results for input(s): \"TSH\" in the last 72 hours.    Planned anesthetic agent - Versed and

## 2025-07-31 NOTE — ANESTHESIA POSTPROCEDURE EVALUATION
Department of Anesthesiology  Postprocedure Note    Patient: Brandon Ramirez  MRN: [id number]  YOB: 1950  Date of evaluation: 7/31/2025    Procedure Summary       Date: 07/31/25 Room / Location: [facility] Special Procedures    Anesthesia Start: 0935 Anesthesia Stop: 0949    Procedure: RONNIE (PRN CONTRAST/BUBBLE/3D) Diagnosis:       Paroxysmal A-fib (HCC)      Presence of Watchman left atrial appendage closure device    Scheduled Providers: Raji Portillo MD; Brandan Burr MD Responsible Provider: Brandna Burr MD    Anesthesia Type: MAC ASA Status: 3            Anesthesia Type: No value filed.    Janeth Phase I: Janeth Score: 8    Janeth Phase II: Janeth Score: 10    Anesthesia Post Evaluation    Patient location during evaluation: PACU  Patient participation: complete - patient participated  Level of consciousness: awake  Airway patency: patent  Nausea & Vomiting: no nausea and no vomiting  Cardiovascular status: blood pressure returned to baseline and hemodynamically stable  Respiratory status: acceptable  Hydration status: euvolemic  Multimodal analgesia pain management approach  Pain management: adequate    No notable events documented.

## 2025-07-31 NOTE — ANESTHESIA PRE PROCEDURE
Department of Anesthesiology  Preprocedure Note       Name:  Brandon Ramirez   Age:  75 y.o.  :  1950                                          MRN:  [id number]         Date:  2025      Surgeon: * No surgeons listed *    Procedure: * No procedures listed *    Medications prior to admission:   Prior to Admission medications    Medication Sig Start Date End Date Taking? Authorizing Provider   aspirin 81 MG chewable tablet Take 1 tablet by mouth daily 6/10/25   Kirsten Ortega APRN - CNP   clopidogrel (PLAVIX) 75 MG tablet Take 1 tablet by mouth daily 6/10/25   Kirsten Ortega APRN - CNP   carvedilol (COREG) 12.5 MG tablet Take 1 tablet by mouth 2 times daily TAKE ONE TABLET BY MOUTH TWICE A DAY WITH MEALS 6/10/25   Kirsten Ortega APRN - CNP   furosemide (LASIX) 80 MG tablet Take 1 tablet by mouth daily 25   Kirsten Ortega APRN - CNP   amiodarone (CORDARONE) 200 MG tablet Take one tablet by mouth daily 25   Misti Lopez APRN - CNP   levothyroxine (SYNTHROID) 137 MCG tablet Take 1 tablet by mouth Daily 24   Rae Moreira MD   vitamin D (CHOLECALCIFEROL) 50 MCG (2000) CAPS capsule Take 1 capsule by mouth daily  Patient not taking: Reported on 2025   Rae Moreira MD   allopurinol (ZYLOPRIM) 300 MG tablet Take 1 tablet by mouth daily 10/15/18   Surinder Reinoso MD       Current medications:    Current Outpatient Medications   Medication Sig Dispense Refill   • aspirin 81 MG chewable tablet Take 1 tablet by mouth daily 90 tablet 3   • clopidogrel (PLAVIX) 75 MG tablet Take 1 tablet by mouth daily 90 tablet 1   • carvedilol (COREG) 12.5 MG tablet Take 1 tablet by mouth 2 times daily TAKE ONE TABLET BY MOUTH TWICE A DAY WITH MEALS 60 tablet 2   • furosemide (LASIX) 80 MG tablet Take 1 tablet by mouth daily 90 tablet 2   • amiodarone (CORDARONE) 200 MG tablet Take one tablet by mouth daily 90 tablet 2   • levothyroxine (SYNTHROID) 137 MCG tablet Take 1

## 2025-08-05 PROBLEM — I50.20 HFREF (HEART FAILURE WITH REDUCED EJECTION FRACTION) (HCC): Status: ACTIVE | Noted: 2025-07-28

## 2025-08-18 ENCOUNTER — PROCEDURE VISIT (OUTPATIENT)
Dept: CARDIOLOGY CLINIC | Age: 75
End: 2025-08-18

## 2025-08-18 ENCOUNTER — HOSPITAL ENCOUNTER (OUTPATIENT)
Age: 75
Setting detail: OUTPATIENT SURGERY
Discharge: HOME | End: 2025-08-18
Attending: INTERNAL MEDICINE | Admitting: INTERNAL MEDICINE
Payer: MEDICARE

## 2025-08-18 ENCOUNTER — ANESTHESIA (OUTPATIENT)
Dept: INTERVENTIONAL RADIOLOGY/VASCULAR | Age: 75
End: 2025-08-18
Payer: MEDICARE

## 2025-08-18 ENCOUNTER — ANESTHESIA EVENT (OUTPATIENT)
Dept: INTERVENTIONAL RADIOLOGY/VASCULAR | Age: 75
End: 2025-08-18
Payer: MEDICARE

## 2025-08-18 VITALS
SYSTOLIC BLOOD PRESSURE: 120 MMHG | RESPIRATION RATE: 16 BRPM | TEMPERATURE: 97.4 F | OXYGEN SATURATION: 100 % | HEART RATE: 70 BPM | HEIGHT: 74 IN | BODY MASS INDEX: 26.82 KG/M2 | WEIGHT: 209 LBS | DIASTOLIC BLOOD PRESSURE: 94 MMHG

## 2025-08-18 DIAGNOSIS — I48.0 PAROXYSMAL ATRIAL FIBRILLATION (HCC): ICD-10-CM

## 2025-08-18 DIAGNOSIS — Z95.810 PRESENCE OF CARDIAC RESYNCHRONIZATION THERAPY DEFIBRILLATOR (CRT-D): Primary | ICD-10-CM

## 2025-08-18 DIAGNOSIS — I50.20 HFREF (HEART FAILURE WITH REDUCED EJECTION FRACTION) (HCC): ICD-10-CM

## 2025-08-18 LAB
ALBUMIN SERPL-MCNC: 4.1 G/DL (ref 3.4–5)
ALBUMIN/GLOB SERPL: 1.3 {RATIO} (ref 1.1–2.2)
ALP SERPL-CCNC: 53 U/L (ref 40–129)
ALT SERPL-CCNC: <5 U/L (ref 10–40)
ANION GAP SERPL CALCULATED.3IONS-SCNC: 11 MMOL/L (ref 3–16)
AST SERPL-CCNC: 12 U/L (ref 15–37)
BILIRUB SERPL-MCNC: 0.8 MG/DL (ref 0–1)
BUN SERPL-MCNC: 43 MG/DL (ref 7–20)
CALCIUM SERPL-MCNC: 10 MG/DL (ref 8.3–10.6)
CHLORIDE SERPL-SCNC: 99 MMOL/L (ref 99–110)
CO2 SERPL-SCNC: 27 MMOL/L (ref 21–32)
CREAT SERPL-MCNC: 9.7 MG/DL (ref 0.8–1.3)
DEPRECATED RDW RBC AUTO: 15.2 % (ref 12.4–15.4)
ECHO BSA: 2.22 M2
EKG ATRIAL RATE: 136 BPM
EKG DIAGNOSIS: NORMAL
EKG Q-T INTERVAL: 472 MS
EKG QRS DURATION: 150 MS
EKG QTC CALCULATION (BAZETT): 544 MS
EKG R AXIS: 140 DEGREES
EKG T AXIS: 120 DEGREES
EKG VENTRICULAR RATE: 80 BPM
GFR SERPLBLD CREATININE-BSD FMLA CKD-EPI: 5 ML/MIN/{1.73_M2}
GLUCOSE SERPL-MCNC: 85 MG/DL (ref 70–99)
HCT VFR BLD AUTO: 40.5 % (ref 40.5–52.5)
HGB BLD-MCNC: 13.4 G/DL (ref 13.5–17.5)
INR PPP: 1.08 (ref 0.86–1.14)
MCH RBC QN AUTO: 31.3 PG (ref 26–34)
MCHC RBC AUTO-ENTMCNC: 33.2 G/DL (ref 31–36)
MCV RBC AUTO: 94.3 FL (ref 80–100)
PLATELET # BLD AUTO: 155 K/UL (ref 135–450)
PMV BLD AUTO: 7.5 FL (ref 5–10.5)
POTASSIUM SERPL-SCNC: 5 MMOL/L (ref 3.5–5.1)
PROT SERPL-MCNC: 7.3 G/DL (ref 6.4–8.2)
PROTHROMBIN TIME: 14.3 SEC (ref 12.1–14.9)
RBC # BLD AUTO: 4.29 M/UL (ref 4.2–5.9)
SODIUM SERPL-SCNC: 137 MMOL/L (ref 136–145)
WBC # BLD AUTO: 4.7 K/UL (ref 4–11)

## 2025-08-18 PROCEDURE — 2709999900 HC NON-CHARGEABLE SUPPLY: Performed by: INTERNAL MEDICINE

## 2025-08-18 PROCEDURE — 6360000002 HC RX W HCPCS: Performed by: INTERNAL MEDICINE

## 2025-08-18 PROCEDURE — 93010 ELECTROCARDIOGRAM REPORT: CPT | Performed by: INTERNAL MEDICINE

## 2025-08-18 PROCEDURE — 33264 RMVL & RPLCMT DFB GEN MLT LD: CPT | Performed by: INTERNAL MEDICINE

## 2025-08-18 PROCEDURE — 3700000001 HC ADD 15 MINUTES (ANESTHESIA): Performed by: INTERNAL MEDICINE

## 2025-08-18 PROCEDURE — 6360000002 HC RX W HCPCS

## 2025-08-18 PROCEDURE — C1882 AICD, OTHER THAN SING/DUAL: HCPCS | Performed by: INTERNAL MEDICINE

## 2025-08-18 PROCEDURE — 85027 COMPLETE CBC AUTOMATED: CPT

## 2025-08-18 PROCEDURE — 85610 PROTHROMBIN TIME: CPT

## 2025-08-18 PROCEDURE — 2720000010 HC SURG SUPPLY STERILE: Performed by: INTERNAL MEDICINE

## 2025-08-18 PROCEDURE — 7100000010 HC PHASE II RECOVERY - FIRST 15 MIN: Performed by: INTERNAL MEDICINE

## 2025-08-18 PROCEDURE — 80053 COMPREHEN METABOLIC PANEL: CPT

## 2025-08-18 PROCEDURE — 3700000000 HC ANESTHESIA ATTENDED CARE: Performed by: INTERNAL MEDICINE

## 2025-08-18 PROCEDURE — 2580000003 HC RX 258

## 2025-08-18 PROCEDURE — 93005 ELECTROCARDIOGRAM TRACING: CPT | Performed by: INTERNAL MEDICINE

## 2025-08-18 PROCEDURE — 7100000011 HC PHASE II RECOVERY - ADDTL 15 MIN: Performed by: INTERNAL MEDICINE

## 2025-08-18 DEVICE — IMPLANTABLE DEVICE: Type: IMPLANTABLE DEVICE | Status: FUNCTIONAL

## 2025-08-18 RX ORDER — SODIUM CHLORIDE 0.9 % (FLUSH) 0.9 %
5-40 SYRINGE (ML) INJECTION EVERY 12 HOURS SCHEDULED
Status: DISCONTINUED | OUTPATIENT
Start: 2025-08-18 | End: 2025-08-18 | Stop reason: HOSPADM

## 2025-08-18 RX ORDER — SODIUM CHLORIDE, SODIUM LACTATE, POTASSIUM CHLORIDE, CALCIUM CHLORIDE 600; 310; 30; 20 MG/100ML; MG/100ML; MG/100ML; MG/100ML
INJECTION, SOLUTION INTRAVENOUS
Status: DISCONTINUED | OUTPATIENT
Start: 2025-08-18 | End: 2025-08-18 | Stop reason: SDUPTHER

## 2025-08-18 RX ORDER — PROPOFOL 10 MG/ML
INJECTION, EMULSION INTRAVENOUS
Status: DISCONTINUED | OUTPATIENT
Start: 2025-08-18 | End: 2025-08-18 | Stop reason: SDUPTHER

## 2025-08-18 RX ORDER — SODIUM CHLORIDE 9 MG/ML
INJECTION, SOLUTION INTRAVENOUS PRN
Status: DISCONTINUED | OUTPATIENT
Start: 2025-08-18 | End: 2025-08-18 | Stop reason: HOSPADM

## 2025-08-18 RX ORDER — BUPIVACAINE HYDROCHLORIDE 2.5 MG/ML
INJECTION, SOLUTION EPIDURAL; INFILTRATION; INTRACAUDAL; PERINEURAL PRN
Status: DISCONTINUED | OUTPATIENT
Start: 2025-08-18 | End: 2025-08-18 | Stop reason: HOSPADM

## 2025-08-18 RX ORDER — LIDOCAINE HYDROCHLORIDE 10 MG/ML
INJECTION, SOLUTION INFILTRATION; PERINEURAL PRN
Status: DISCONTINUED | OUTPATIENT
Start: 2025-08-18 | End: 2025-08-18 | Stop reason: HOSPADM

## 2025-08-18 RX ORDER — LIDOCAINE HYDROCHLORIDE 20 MG/ML
INJECTION, SOLUTION EPIDURAL; INFILTRATION; INTRACAUDAL; PERINEURAL
Status: DISCONTINUED | OUTPATIENT
Start: 2025-08-18 | End: 2025-08-18 | Stop reason: SDUPTHER

## 2025-08-18 RX ORDER — LIDOCAINE HYDROCHLORIDE 10 MG/ML
INJECTION, SOLUTION EPIDURAL; INFILTRATION; INTRACAUDAL; PERINEURAL PRN
Status: DISCONTINUED | OUTPATIENT
Start: 2025-08-18 | End: 2025-08-18 | Stop reason: HOSPADM

## 2025-08-18 RX ORDER — SODIUM CHLORIDE 0.9 % (FLUSH) 0.9 %
5-40 SYRINGE (ML) INJECTION PRN
Status: DISCONTINUED | OUTPATIENT
Start: 2025-08-18 | End: 2025-08-18 | Stop reason: HOSPADM

## 2025-08-18 RX ORDER — CLINDAMYCIN PHOSPHATE 600 MG/50ML
600 INJECTION, SOLUTION INTRAVENOUS ONCE
Status: COMPLETED | OUTPATIENT
Start: 2025-08-18 | End: 2025-08-18

## 2025-08-18 RX ADMIN — PROPOFOL 75 MCG/KG/MIN: 10 INJECTION, EMULSION INTRAVENOUS at 12:05

## 2025-08-18 RX ADMIN — LIDOCAINE HYDROCHLORIDE 100 MG: 20 INJECTION, SOLUTION EPIDURAL; INFILTRATION; INTRACAUDAL; PERINEURAL at 12:05

## 2025-08-18 RX ADMIN — PROPOFOL 20 MG: 10 INJECTION, EMULSION INTRAVENOUS at 12:06

## 2025-08-18 RX ADMIN — SODIUM CHLORIDE, SODIUM LACTATE, POTASSIUM CHLORIDE, AND CALCIUM CHLORIDE: .6; .31; .03; .02 INJECTION, SOLUTION INTRAVENOUS at 11:51

## 2025-08-18 RX ADMIN — CLINDAMYCIN PHOSPHATE 600 MG: 600 INJECTION, SOLUTION INTRAVENOUS at 12:08

## 2025-08-18 ASSESSMENT — PAIN SCALES - GENERAL
PAINLEVEL_OUTOF10: 0

## 2025-08-25 ENCOUNTER — CLINICAL SUPPORT (OUTPATIENT)
Dept: CARDIOLOGY CLINIC | Age: 75
End: 2025-08-25

## (undated) DEVICE — SPONGE,DRAIN,NONWVN,4"X4",6PLY,STRL,LF: Brand: MEDLINE

## (undated) DEVICE — SCISSORS SURG DIA8MM MPLR CRV ENDOWRIST

## (undated) DEVICE — PROBE COVER KIT: Brand: MEDLINE INDUSTRIES, INC.

## (undated) DEVICE — 40583 XL ADVANCED TRENDELENBURG POSITIONING KIT: Brand: 40583 XL ADVANCED TRENDELENBURG POSITIONING KIT

## (undated) DEVICE — PAD PREP L 2 PLY 70% ISO ALC NONWOVEN SFT ABSRB TOP ANTISEP

## (undated) DEVICE — SUTURE 1 STRATAFIX SYMMETRIC PDS + 30CM CT-1 SXPP1A435

## (undated) DEVICE — THIS ADAPTER IS A DOUBLE SEALING FEMALE LUER LOCK ADAPTER WITH A 2-PIECE, COMBINATION COMPRESSION FIT/BARBED CATHETER CONNECTOR. THE ADAPTER IS USED TO CONNECT THE PD CATHETER TO A SOLUTION TRANSFER SET WITH LOCKING CONNECTOR.: Brand: LOCKING TITANIUM ADAPTER FOR PERITONEAL DIALYSIS CATHETER

## (undated) DEVICE — ACCESS SHEATH WITH DILATOR: Brand: WATCHMAN TRUSTEER™ ACCESS SYSTEM

## (undated) DEVICE — STRIP,CLOSURE,WOUND,MEDI-STRIP,1/2X4: Brand: MEDLINE

## (undated) DEVICE — SUTURE VCRL SZ 2-0 L27IN ABSRB UD L26MM SH 1/2 CIR J417H

## (undated) DEVICE — SUTURE STRATAFIX SPRL PDS + SZ 2 0 L12IN ABSRB VLT SH L26MM SXPP1B416

## (undated) DEVICE — KIT,ANTI FOG,W/SPONGE & FLUID,SOFT PACK: Brand: MEDLINE

## (undated) DEVICE — PACEMAKER PACK: Brand: MEDLINE INDUSTRIES, INC.

## (undated) DEVICE — Z DISCONTINUED NEEDLE HYPO 22GA L1.5IN BLK POLYPR HUB S STL REG BVL STR - SEE COMMENT

## (undated) DEVICE — GENERAL LAPAROSCOPIC: Brand: MEDLINE INDUSTRIES, INC.

## (undated) DEVICE — TROCAR: Brand: KII OPTICAL ACCESS SYSTEM

## (undated) DEVICE — SUTURE VCRL SZ 0 L54IN ABSRB VLT W/O NDL POLYGLACTIN 910 J616H

## (undated) DEVICE — INSUFFLATION NEEDLE TO ESTABLISH PNEUMOPERITONEUM.: Brand: INSUFFLATION NEEDLE

## (undated) DEVICE — MASIMOSET LNCS ADTX SPO2 ADULT PULSE OXIMETER ADHESIVE SENSOR: Brand: MASIMOSET® LNCS® ADTX SPO2 ADULT PULSE OXIMETER ADHESIVE SENSOR

## (undated) DEVICE — Device: Brand: NOMOLINE™ LH ADULT NASAL CO2 CANNULA WITH O2 4M

## (undated) DEVICE — SNARE COLD DIAMOND 10MM THIN

## (undated) DEVICE — SYRINGE MED 10ML LUERLOCK TIP W/O SFTY DISP

## (undated) DEVICE — COVER LT HNDL UNIV FOR LNG HNDL KOVER 1 PER PK

## (undated) DEVICE — PAD, DEFIB, ADULT, RADIOTRANS, PHYSIO: Brand: MEDLINE

## (undated) DEVICE — TUBING IRRIG L77IN DIA0.241IN L BOR FOR CYSTO W/ NVENT

## (undated) DEVICE — LAPAROSCOPIC SCISSORS: Brand: EPIX LAPAROSCOPIC SCISSORS

## (undated) DEVICE — PROGRASP FORCEPS: Brand: ENDOWRIST

## (undated) DEVICE — ELECTRODE PT RET AD L9FT HI MOIST COND ADH HYDRGEL CORDED

## (undated) DEVICE — THIS DEVICE IS A DOUBLE SEALING MALE LUER LOCK INTENDED FOR USE WITH THE BAXTER LOCKING TITANIUM ADAPTER FOR PERITONEAL DIALYSIS.: Brand: LOCKING CAP FOR PERITONEAL DIALYSIS CATHETER ADAPTER

## (undated) DEVICE — PINNACLE INTRODUCER SHEATH: Brand: PINNACLE

## (undated) DEVICE — APPLICATOR MEDICATED 26 CC SOLUTION HI LT ORNG CHLORAPREP

## (undated) DEVICE — COVER LT HNDL BLU PLAS

## (undated) DEVICE — TOWEL,STOP FLAG GOLD N-W: Brand: MEDLINE

## (undated) DEVICE — SYSTEM SMK EVAC LAP TBNG FILTER HSNG BENT STYL PNK SEE CLR

## (undated) DEVICE — SYRINGE MED 10ML TRNSLUC BRL PLUNG BLK MRK POLYPR CTRL

## (undated) DEVICE — RESOLUTION 360 CLIP 155CM

## (undated) DEVICE — LAAC ACCESS SOLUTION: Brand: VERSACROSS CONNECT™ LAAC ACCESS SOLUTION

## (undated) DEVICE — TROCAR: Brand: KII FIOS FIRST ENTRY

## (undated) DEVICE — SUTURE ABSORBABLE BRAIDED 2-0 CT-1 27 IN UD VICRYL J259H

## (undated) DEVICE — 3M™ IOBAN™ 2 ANTIMICROBIAL INCISE DRAPE 6648EZ: Brand: IOBAN™ 2

## (undated) DEVICE — 3M™ TEGADERM™ TRANSPARENT FILM DRESSING FRAME STYLE, 1628, 6 IN X 8 IN (15 CM X 20 CM), 10/CT 8CT/CASE: Brand: 3M™ TEGADERM™

## (undated) DEVICE — Device

## (undated) DEVICE — ROBOTIC: Brand: MEDLINE INDUSTRIES, INC.

## (undated) DEVICE — PERCLOSE™ PROSTYLE™ SUTURE-MEDIATED CLOSURE AND REPAIR SYSTEM: Brand: PERCLOSE™ PROSTYLE™

## (undated) DEVICE — ARM DRAPE

## (undated) DEVICE — SUTURE VCRL SZ 3-0 L27IN ABSRB UD L26MM SH 1/2 CIR J416H

## (undated) DEVICE — TIP COVER ACCESSORY

## (undated) DEVICE — BINDER ABD UNIV H9IN WAIST 45-62IN E SFT COT PREM 3 PNL

## (undated) DEVICE — CANNULA SAMP CO2 AD GRN 7FT CO2 AND 7FT O2 TBNG UNIV CONN

## (undated) DEVICE — PERCUTANEOUS ENTRY THINWALL NEEDLE  ONE-PART: Brand: COOK

## (undated) DEVICE — TUBING, SUCTION, 1/4" X 12', STRAIGHT: Brand: MEDLINE

## (undated) DEVICE — CANNULA SEAL

## (undated) DEVICE — MEDIUM INTERVENTIONAL SPECIALTY SHIELD-YELLOW: Brand: RADPAD

## (undated) DEVICE — DRESSING QUIKCLOT FEMORAL INTERVENTIONAL 1.5X1.5 IN

## (undated) DEVICE — BLADELESS OBTURATOR, LONG: Brand: WECK VISTA

## (undated) DEVICE — SYRINGE MED 10ML SLIP TIP BLNT FILL AND LUERLOCK DISP

## (undated) DEVICE — SOLUTION IRRIG 3000ML 0.9% SOD CHL USP UROMATIC PLAS CONT

## (undated) DEVICE — COVER,MAYO STAND,XL,STERILE: Brand: MEDLINE

## (undated) DEVICE — CATHETER ETER ANGIO AD 6FR L110CM DIA0054IN TIP L46CM GWIRE

## (undated) DEVICE — Z INACTIVE USE 2855092 SPONGE LAP X RAY DETECTABLE 18X4 IN 4 PLY COTTON WHT STRL

## (undated) DEVICE — ADHESIVE SKIN CLSR 0.7ML TOP DERMBND ADV

## (undated) DEVICE — GLOVE SURG SZ 75 L12IN THK75MIL DK GRN LTX FREE

## (undated) DEVICE — SOLUTION ANTIFOG VIS SYS CLEARIFY LAPSCP

## (undated) DEVICE — SUTURE MCRYL SZ 4-0 L27IN ABSRB UD L19MM PS-2 1/2 CIR PRIM Y426H

## (undated) DEVICE — TRAP POLYP ETRAP

## (undated) DEVICE — SPONGE GZ W4XL8IN COT WVN 12 PLY

## (undated) DEVICE — SYSTEM EVAC SMOKE LAPARSCOPIC

## (undated) DEVICE — DEVICE CLSR COMPR SYS INTEGR MNOMTR INFL TRNSPAR DOME ADJ

## (undated) DEVICE — CATHETER PERI DLYS L62CM 2 CUF CURLED ARGY

## (undated) DEVICE — SUTURE VCRL SZ 4-0 L18IN ABSRB UD L19MM PS-2 3/8 CIR PRIM J496H

## (undated) DEVICE — RESTRAINT EXT ANK WRST LT BLU FOAM 2 STRP SIDE BCKL HK AND

## (undated) DEVICE — ANGIOGRAPHY KIT LT HRT 3 V

## (undated) DEVICE — 1LYRTR 16FR10ML100%SIL UMS SNP: Brand: MEDLINE INDUSTRIES, INC.

## (undated) DEVICE — GLOVE ORANGE PI 7   MSG9070